# Patient Record
Sex: MALE | Race: WHITE | NOT HISPANIC OR LATINO | Employment: OTHER | URBAN - METROPOLITAN AREA
[De-identification: names, ages, dates, MRNs, and addresses within clinical notes are randomized per-mention and may not be internally consistent; named-entity substitution may affect disease eponyms.]

---

## 2023-03-07 ENCOUNTER — HOSPITAL ENCOUNTER (OUTPATIENT)
Dept: DATA CONVERSION | Facility: HOSPITAL | Age: 68
Discharge: ANOTHER HOSPITAL | End: 2023-03-07
Attending: EMERGENCY MEDICINE

## 2023-03-07 DIAGNOSIS — Z79.899 OTHER LONG TERM (CURRENT) DRUG THERAPY: ICD-10-CM

## 2023-03-07 DIAGNOSIS — S12.9XXA FRACTURE OF NECK, UNSPECIFIED, INITIAL ENCOUNTER (MULTI): Primary | ICD-10-CM

## 2023-03-07 DIAGNOSIS — F10.139 ALCOHOL ABUSE WITH WITHDRAWAL, UNSPECIFIED (MULTI): ICD-10-CM

## 2023-03-07 DIAGNOSIS — Z04.3 ENCOUNTER FOR EXAMINATION AND OBSERVATION FOLLOWING OTHER ACCIDENT: ICD-10-CM
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
## 2023-03-07 DIAGNOSIS — Z20.822 CONTACT WITH AND (SUSPECTED) EXPOSURE TO COVID-19: ICD-10-CM

## 2023-03-07 DIAGNOSIS — Z23 ENCOUNTER FOR IMMUNIZATION: ICD-10-CM

## 2023-03-07 LAB
ALBUMIN SERPL-MCNC: 4.3 GM/DL (ref 3.5–5)
ALBUMIN/GLOB SERPL: 1.3 RATIO (ref 1.5–3)
ALP BLD-CCNC: 87 U/L (ref 35–125)
ALT SERPL-CCNC: 93 U/L (ref 5–40)
AMPHETAMINES UR QL SCN>1000 NG/ML: NEGATIVE
ANION GAP SERPL CALCULATED.3IONS-SCNC: 14 MMOL/L (ref 0–19)
AST SERPL-CCNC: 136 U/L (ref 5–40)
BACTERIA UR QL AUTO: NEGATIVE
BARBITURATES UR QL SCN>300 NG/ML: NEGATIVE
BASOPHILS # BLD AUTO: 0.06 K/UL (ref 0–0.22)
BASOPHILS NFR BLD AUTO: 0.9 % (ref 0–1)
BENZODIAZ UR QL SCN>300 NG/ML: NEGATIVE
BILIRUB SERPL-MCNC: 0.9 MG/DL (ref 0.1–1.2)
BILIRUB UR QL STRIP.AUTO: NEGATIVE
BUN SERPL-MCNC: 8 MG/DL (ref 8–25)
BUN/CREAT SERPL: 10 RATIO (ref 8–21)
BZE UR QL SCN>300 NG/ML: NEGATIVE
CALCIUM SERPL-MCNC: 9.3 MG/DL (ref 8.5–10.4)
CANNABINOIDS UR QL SCN>50 NG/ML: NEGATIVE
CHLORIDE SERPL-SCNC: 106 MMOL/L (ref 97–107)
CLARITY UR: CLEAR
CO2 SERPL-SCNC: 26 MMOL/L (ref 24–31)
COLOR UR: YELLOW
CREAT SERPL-MCNC: 0.8 MG/DL (ref 0.4–1.6)
DEPRECATED RDW RBC AUTO: 44.2 FL (ref 37–54)
DIFFERENTIAL METHOD BLD: ABNORMAL
DRUG SCREEN COMMENT UR-IMP: NORMAL
EOSINOPHIL # BLD AUTO: 0.07 K/UL (ref 0–0.45)
EOSINOPHIL NFR BLD: 1.1 % (ref 0–3)
ERYTHROCYTE [DISTWIDTH] IN BLOOD BY AUTOMATED COUNT: 12.5 % (ref 11.7–15)
ETHANOL SERPL-MCNC: 0.23 GM/DL (ref 0–0.01)
EUA DISCLAIMER: NORMAL
FENTANYL+NORFENTANYL UR QL SCN: NORMAL
FLUAV RNA NPH QL NAA+PROBE: NEGATIVE
FLUBV RNA NPH QL NAA+PROBE: NEGATIVE
GFR SERPL CREATININE-BSD FRML MDRD: 98 ML/MIN/1.73 M2
GLOBULIN SER-MCNC: 3.4 G/DL (ref 1.9–3.7)
GLUCOSE SERPL-MCNC: 136 MG/DL (ref 65–99)
GLUCOSE UR STRIP.AUTO-MCNC: NEGATIVE MG/DL
HCT VFR BLD AUTO: 45.8 % (ref 41–50)
HGB BLD-MCNC: 15.5 GM/DL (ref 13.5–16.5)
HGB UR QL STRIP.AUTO: 12 /HPF (ref 0–3)
HGB UR QL: ABNORMAL
HS TROPONIN T DELTA: NORMAL (ref 0–4)
HYALINE CASTS UR QL AUTO: 3 /LPF
IMM GRANULOCYTES # BLD AUTO: 0.04 K/UL (ref 0–0.1)
KETONES UR QL STRIP.AUTO: ABNORMAL
LEUKOCYTE ESTERASE UR QL STRIP.AUTO: NEGATIVE
LYMPHOCYTES # BLD AUTO: 1.57 K/UL (ref 1.2–3.2)
LYMPHOCYTES NFR BLD MANUAL: 23.9 % (ref 20–40)
MCH RBC QN AUTO: 32.5 PG (ref 26–34)
MCHC RBC AUTO-ENTMCNC: 33.8 % (ref 31–37)
MCV RBC AUTO: 96 FL (ref 80–100)
METHADONE UR QL SCN>300 NG/ML: NEGATIVE
MICROSCOPIC (UA): ABNORMAL
MONOCYTES # BLD AUTO: 0.56 K/UL (ref 0–0.8)
MONOCYTES NFR BLD MANUAL: 8.5 % (ref 0–8)
NEUTROPHILS # BLD AUTO: 4.26 K/UL
NEUTROPHILS # BLD AUTO: 4.26 K/UL (ref 1.8–7.7)
NEUTROPHILS.IMMATURE NFR BLD: 0.6 % (ref 0–1)
NEUTS SEG NFR BLD: 65 % (ref 50–70)
NITRITE UR QL STRIP.AUTO: NEGATIVE
NRBC BLD-RTO: 0 /100 WBC
NT-PROBNP SERPL-MCNC: 196 PG/ML (ref 0–229)
OPIATES UR QL SCN>300 NG/ML: NEGATIVE
OXYCODONE UR QL: NEGATIVE
PCP UR QL SCN>25 NG/ML: NEGATIVE
PH UR STRIP.AUTO: 6 [PH] (ref 4.6–8)
PLATELET # BLD AUTO: 127 K/UL (ref 150–450)
PLATELET BLD QL SMEAR: ABNORMAL
PMV BLD AUTO: 9.4 CU (ref 7–12.6)
POTASSIUM SERPL-SCNC: 4.3 MMOL/L (ref 3.4–5.1)
PROLACTIN SERPL-MCNC: 33.7 NG/ML (ref 4–15.2)
PROT SERPL-MCNC: 7.7 G/DL (ref 5.9–7.9)
PROT UR STRIP.AUTO-MCNC: ABNORMAL MG/DL
RBC # BLD AUTO: 4.77 M/UL (ref 4.5–5.5)
RBC MORPH BLD: ABNORMAL
SARS-COV-2 RNA SPEC QL NAA+PROBE: NEGATIVE
SODIUM SERPL-SCNC: 146 MMOL/L (ref 133–145)
SP GR UR STRIP.AUTO: 1.02 (ref 1–1.03)
SQUAMOUS UR QL AUTO: ABNORMAL /HPF
TOXIC GRANULES BLD QL SMEAR: ABNORMAL
TROPONIN T SERPL-MCNC: 11 NG/L
URINE CULTURE: ABNORMAL
UROBILINOGEN UR QL STRIP.AUTO: 4 MG/DL (ref 0–1)
WBC # BLD AUTO: 6.6 K/UL (ref 4.5–11)
WBC #/AREA URNS AUTO: 1 /HPF (ref 0–3)
WBC MORPH BLD: ABNORMAL

## 2023-05-11 ENCOUNTER — HOSPITAL ENCOUNTER (OUTPATIENT)
Dept: DATA CONVERSION | Facility: HOSPITAL | Age: 68
Discharge: HOME | End: 2023-05-11
Attending: EMERGENCY MEDICINE

## 2023-05-11 DIAGNOSIS — R53.1 WEAKNESS: ICD-10-CM

## 2023-05-11 DIAGNOSIS — M54.2 CERVICALGIA: ICD-10-CM

## 2023-05-11 DIAGNOSIS — F10.229 ALCOHOL DEPENDENCE WITH INTOXICATION, UNSPECIFIED (MULTI): Primary | ICD-10-CM
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
## 2023-05-11 DIAGNOSIS — Z82.49 FAMILY HISTORY OF ISCHEMIC HEART DISEASE AND OTHER DISEASES OF THE CIRCULATORY SYSTEM: ICD-10-CM
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
## 2023-05-11 DIAGNOSIS — Z87.891 PERSONAL HISTORY OF NICOTINE DEPENDENCE: ICD-10-CM

## 2023-05-11 DIAGNOSIS — R26.89 OTHER ABNORMALITIES OF GAIT AND MOBILITY: ICD-10-CM

## 2023-05-11 LAB
ALBUMIN SERPL-MCNC: 4.8 GM/DL (ref 3.5–5)
ALBUMIN/GLOB SERPL: 1.5 RATIO (ref 1.5–3)
ALP BLD-CCNC: 64 U/L (ref 35–125)
ALT SERPL-CCNC: 50 U/L (ref 5–40)
AMPHETAMINES UR QL SCN>1000 NG/ML: NEGATIVE
ANION GAP SERPL CALCULATED.3IONS-SCNC: 25 MMOL/L (ref 0–19)
AST SERPL-CCNC: 73 U/L (ref 5–40)
BACTERIA UR QL AUTO: NEGATIVE
BARBITURATES UR QL SCN>300 NG/ML: NEGATIVE
BASOPHILS # BLD AUTO: 0.11 K/UL (ref 0–0.22)
BASOPHILS NFR BLD AUTO: 1.2 % (ref 0–1)
BENZODIAZ UR QL SCN>300 NG/ML: NORMAL
BILIRUB SERPL-MCNC: 2.2 MG/DL (ref 0.1–1.2)
BILIRUB UR QL STRIP.AUTO: NEGATIVE
BUN SERPL-MCNC: 8 MG/DL (ref 8–25)
BUN/CREAT SERPL: 11.4 RATIO (ref 8–21)
BZE UR QL SCN>300 NG/ML: NEGATIVE
CALCIUM SERPL-MCNC: 9.5 MG/DL (ref 8.5–10.4)
CANNABINOIDS UR QL SCN>50 NG/ML: NEGATIVE
CHLORIDE SERPL-SCNC: 96 MMOL/L (ref 97–107)
CLARITY UR: CLEAR
CO2 SERPL-SCNC: 19 MMOL/L (ref 24–31)
COLOR UR: YELLOW
CREAT SERPL-MCNC: 0.7 MG/DL (ref 0.4–1.6)
DEPRECATED RDW RBC AUTO: 44.9 FL (ref 37–54)
DIFFERENTIAL METHOD BLD: ABNORMAL
DRUG SCREEN COMMENT UR-IMP: NORMAL
EOSINOPHIL # BLD AUTO: 0.04 K/UL (ref 0–0.45)
EOSINOPHIL NFR BLD: 0.4 % (ref 0–3)
ERYTHROCYTE [DISTWIDTH] IN BLOOD BY AUTOMATED COUNT: 13.3 % (ref 11.7–15)
ETHANOL SERPL-MCNC: 0.24 GM/DL (ref 0–0.01)
FENTANYL+NORFENTANYL UR QL SCN: NORMAL
GFR SERPL CREATININE-BSD FRML MDRD: 102 ML/MIN/1.73 M2
GLOBULIN SER-MCNC: 3.2 G/DL (ref 1.9–3.7)
GLUCOSE SERPL-MCNC: 67 MG/DL (ref 65–99)
GLUCOSE UR STRIP.AUTO-MCNC: NEGATIVE MG/DL
HCT VFR BLD AUTO: 50.8 % (ref 41–50)
HGB BLD-MCNC: 17.3 GM/DL (ref 13.5–16.5)
HGB UR QL STRIP.AUTO: 14 /HPF (ref 0–3)
HGB UR QL: NEGATIVE
HYALINE CASTS UR QL AUTO: 3 /LPF
IMM GRANULOCYTES # BLD AUTO: 0.02 K/UL (ref 0–0.1)
KETONES UR QL STRIP.AUTO: ABNORMAL
LEUKOCYTE ESTERASE UR QL STRIP.AUTO: NEGATIVE
LYMPHOCYTES # BLD AUTO: 4.09 K/UL (ref 1.2–3.2)
LYMPHOCYTES NFR BLD MANUAL: 44.7 % (ref 20–40)
MCH RBC QN AUTO: 31 PG (ref 26–34)
MCHC RBC AUTO-ENTMCNC: 34.1 % (ref 31–37)
MCV RBC AUTO: 91 FL (ref 80–100)
METHADONE UR QL SCN>300 NG/ML: NEGATIVE
MICROSCOPIC (UA): ABNORMAL
MONOCYTES # BLD AUTO: 0.56 K/UL (ref 0–0.8)
MONOCYTES NFR BLD MANUAL: 6.1 % (ref 0–8)
NEUTROPHILS # BLD AUTO: 4.33 K/UL
NEUTROPHILS # BLD AUTO: 4.33 K/UL (ref 1.8–7.7)
NEUTROPHILS.IMMATURE NFR BLD: 0.2 % (ref 0–1)
NEUTS SEG NFR BLD: 47.4 % (ref 50–70)
NITRITE UR QL STRIP.AUTO: NEGATIVE
NRBC BLD-RTO: 0 /100 WBC
OPIATES UR QL SCN>300 NG/ML: NEGATIVE
OXYCODONE UR QL: NEGATIVE
PCP UR QL SCN>25 NG/ML: NEGATIVE
PH UR STRIP.AUTO: 5.5 [PH] (ref 4.6–8)
PLATELET # BLD AUTO: 205 K/UL (ref 150–450)
PMV BLD AUTO: 9.1 CU (ref 7–12.6)
POTASSIUM SERPL-SCNC: 4.1 MMOL/L (ref 3.4–5.1)
PROT SERPL-MCNC: 8 G/DL (ref 5.9–7.9)
PROT UR STRIP.AUTO-MCNC: 30 MG/DL
RBC # BLD AUTO: 5.58 M/UL (ref 4.5–5.5)
SODIUM SERPL-SCNC: 140 MMOL/L (ref 133–145)
SP GR UR STRIP.AUTO: 1.02 (ref 1–1.03)
SQUAMOUS UR QL AUTO: ABNORMAL /HPF
URINE CULTURE: ABNORMAL
UROBILINOGEN UR QL STRIP.AUTO: NORMAL MG/DL (ref 0–1)
WBC # BLD AUTO: 9.2 K/UL (ref 4.5–11)
WBC #/AREA URNS AUTO: ABNORMAL /HPF (ref 0–3)

## 2024-05-22 ENCOUNTER — HOSPITAL ENCOUNTER (EMERGENCY)
Facility: HOSPITAL | Age: 69
Discharge: OTHER INSTITUTION | End: 2024-05-23
Attending: EMERGENCY MEDICINE
Payer: MEDICARE

## 2024-05-22 ENCOUNTER — APPOINTMENT (OUTPATIENT)
Dept: CARDIOLOGY | Facility: HOSPITAL | Age: 69
End: 2024-05-22
Payer: MEDICARE

## 2024-05-22 DIAGNOSIS — R45.851 SUICIDAL IDEATION: Primary | ICD-10-CM

## 2024-05-22 LAB
ALBUMIN SERPL-MCNC: 4.2 G/DL (ref 3.5–5)
ALP BLD-CCNC: 148 U/L (ref 35–125)
ALT SERPL-CCNC: 106 U/L (ref 5–40)
AMPHETAMINES UR QL SCN>1000 NG/ML: NEGATIVE
ANION GAP SERPL CALC-SCNC: >19 MMOL/L
APPEARANCE UR: ABNORMAL
AST SERPL-CCNC: 270 U/L (ref 5–40)
BARBITURATES UR QL SCN>300 NG/ML: NEGATIVE
BASOPHILS # BLD AUTO: 0.08 X10*3/UL (ref 0–0.1)
BASOPHILS NFR BLD AUTO: 1 %
BENZODIAZ UR QL SCN>300 NG/ML: NEGATIVE
BILIRUB SERPL-MCNC: 3.5 MG/DL (ref 0.1–1.2)
BILIRUB UR STRIP.AUTO-MCNC: ABNORMAL MG/DL
BUN SERPL-MCNC: 7 MG/DL (ref 8–25)
BZE UR QL SCN>300 NG/ML: NEGATIVE
CALCIUM SERPL-MCNC: 9 MG/DL (ref 8.5–10.4)
CANNABINOIDS UR QL SCN>50 NG/ML: NEGATIVE
CAOX CRY #/AREA UR COMP ASSIST: ABNORMAL /HPF
CHLORIDE SERPL-SCNC: 102 MMOL/L (ref 97–107)
CO2 SERPL-SCNC: 19 MMOL/L (ref 24–31)
COLOR UR: ABNORMAL
CREAT SERPL-MCNC: 0.5 MG/DL (ref 0.4–1.6)
EGFRCR SERPLBLD CKD-EPI 2021: >90 ML/MIN/1.73M*2
EOSINOPHIL # BLD AUTO: 0.1 X10*3/UL (ref 0–0.7)
EOSINOPHIL NFR BLD AUTO: 1.2 %
ERYTHROCYTE [DISTWIDTH] IN BLOOD BY AUTOMATED COUNT: 15.2 % (ref 11.5–14.5)
ETHANOL SERPL-MCNC: 0.37 G/DL
FENTANYL+NORFENTANYL UR QL SCN: NEGATIVE
GLUCOSE SERPL-MCNC: 104 MG/DL (ref 65–99)
GLUCOSE UR STRIP.AUTO-MCNC: NORMAL MG/DL
HCT VFR BLD AUTO: 47.1 % (ref 41–52)
HGB BLD-MCNC: 16.1 G/DL (ref 13.5–17.5)
IMM GRANULOCYTES # BLD AUTO: 0.02 X10*3/UL (ref 0–0.7)
IMM GRANULOCYTES NFR BLD AUTO: 0.2 % (ref 0–0.9)
KETONES UR STRIP.AUTO-MCNC: ABNORMAL MG/DL
LEUKOCYTE ESTERASE UR QL STRIP.AUTO: NEGATIVE
LYMPHOCYTES # BLD AUTO: 3.05 X10*3/UL (ref 1.2–4.8)
LYMPHOCYTES NFR BLD AUTO: 37.2 %
MCH RBC QN AUTO: 33.3 PG (ref 26–34)
MCHC RBC AUTO-ENTMCNC: 34.2 G/DL (ref 32–36)
MCV RBC AUTO: 98 FL (ref 80–100)
METHADONE UR QL SCN>300 NG/ML: NEGATIVE
MONOCYTES # BLD AUTO: 0.85 X10*3/UL (ref 0.1–1)
MONOCYTES NFR BLD AUTO: 10.4 %
MUCOUS THREADS #/AREA URNS AUTO: ABNORMAL /LPF
NEUTROPHILS # BLD AUTO: 4.09 X10*3/UL (ref 1.2–7.7)
NEUTROPHILS NFR BLD AUTO: 50 %
NITRITE UR QL STRIP.AUTO: NEGATIVE
NRBC BLD-RTO: 0 /100 WBCS (ref 0–0)
OPIATES UR QL SCN>300 NG/ML: NEGATIVE
OXYCODONE UR QL: NEGATIVE
PCP UR QL SCN>25 NG/ML: NEGATIVE
PH UR STRIP.AUTO: 6 [PH]
PLATELET # BLD AUTO: 67 X10*3/UL (ref 150–450)
POTASSIUM SERPL-SCNC: 3.8 MMOL/L (ref 3.4–5.1)
PROT SERPL-MCNC: 7.5 G/DL (ref 5.9–7.9)
PROT UR STRIP.AUTO-MCNC: ABNORMAL MG/DL
RBC # BLD AUTO: 4.83 X10*6/UL (ref 4.5–5.9)
RBC # UR STRIP.AUTO: NEGATIVE /UL
RBC #/AREA URNS AUTO: ABNORMAL /HPF
RBC MORPH BLD: NORMAL
SODIUM SERPL-SCNC: 141 MMOL/L (ref 133–145)
SP GR UR STRIP.AUTO: 1.03
SQUAMOUS #/AREA URNS AUTO: ABNORMAL /HPF
UROBILINOGEN UR STRIP.AUTO-MCNC: ABNORMAL MG/DL
WBC # BLD AUTO: 8.2 X10*3/UL (ref 4.4–11.3)
WBC #/AREA URNS AUTO: ABNORMAL /HPF

## 2024-05-22 PROCEDURE — 2500000004 HC RX 250 GENERAL PHARMACY W/ HCPCS (ALT 636 FOR OP/ED): Performed by: PHYSICIAN ASSISTANT

## 2024-05-22 PROCEDURE — 2500000004 HC RX 250 GENERAL PHARMACY W/ HCPCS (ALT 636 FOR OP/ED): Performed by: EMERGENCY MEDICINE

## 2024-05-22 PROCEDURE — 90839 PSYTX CRISIS INITIAL 60 MIN: CPT

## 2024-05-22 PROCEDURE — 36415 COLL VENOUS BLD VENIPUNCTURE: CPT | Performed by: PHYSICIAN ASSISTANT

## 2024-05-22 PROCEDURE — 2500000001 HC RX 250 WO HCPCS SELF ADMINISTERED DRUGS (ALT 637 FOR MEDICARE OP): Performed by: EMERGENCY MEDICINE

## 2024-05-22 PROCEDURE — 99285 EMERGENCY DEPT VISIT HI MDM: CPT | Mod: 25

## 2024-05-22 PROCEDURE — 80307 DRUG TEST PRSMV CHEM ANLYZR: CPT | Performed by: PHYSICIAN ASSISTANT

## 2024-05-22 PROCEDURE — 96374 THER/PROPH/DIAG INJ IV PUSH: CPT

## 2024-05-22 PROCEDURE — 80053 COMPREHEN METABOLIC PANEL: CPT | Performed by: PHYSICIAN ASSISTANT

## 2024-05-22 PROCEDURE — 93005 ELECTROCARDIOGRAM TRACING: CPT

## 2024-05-22 PROCEDURE — 82077 ASSAY SPEC XCP UR&BREATH IA: CPT | Performed by: EMERGENCY MEDICINE

## 2024-05-22 PROCEDURE — 85025 COMPLETE CBC W/AUTO DIFF WBC: CPT | Performed by: PHYSICIAN ASSISTANT

## 2024-05-22 PROCEDURE — 96375 TX/PRO/DX INJ NEW DRUG ADDON: CPT

## 2024-05-22 PROCEDURE — 81001 URINALYSIS AUTO W/SCOPE: CPT | Mod: 59 | Performed by: PHYSICIAN ASSISTANT

## 2024-05-22 RX ORDER — FOLIC ACID 1 MG/1
1 TABLET ORAL DAILY
Status: DISCONTINUED | OUTPATIENT
Start: 2024-05-22 | End: 2024-05-23 | Stop reason: HOSPADM

## 2024-05-22 RX ORDER — ONDANSETRON HYDROCHLORIDE 2 MG/ML
4 INJECTION, SOLUTION INTRAVENOUS ONCE
Status: COMPLETED | OUTPATIENT
Start: 2024-05-22 | End: 2024-05-22

## 2024-05-22 RX ORDER — MULTIVIT-MIN/IRON FUM/FOLIC AC 7.5 MG-4
1 TABLET ORAL DAILY
Status: DISCONTINUED | OUTPATIENT
Start: 2024-05-22 | End: 2024-05-23 | Stop reason: HOSPADM

## 2024-05-22 RX ORDER — FAMOTIDINE 10 MG/ML
20 INJECTION INTRAVENOUS ONCE
Status: COMPLETED | OUTPATIENT
Start: 2024-05-22 | End: 2024-05-22

## 2024-05-22 RX ORDER — THIAMINE HYDROCHLORIDE 100 MG/ML
100 INJECTION, SOLUTION INTRAMUSCULAR; INTRAVENOUS DAILY
Status: DISCONTINUED | OUTPATIENT
Start: 2024-05-22 | End: 2024-05-23 | Stop reason: HOSPADM

## 2024-05-22 RX ORDER — LANOLIN ALCOHOL/MO/W.PET/CERES
100 CREAM (GRAM) TOPICAL DAILY
Status: DISCONTINUED | OUTPATIENT
Start: 2024-05-25 | End: 2024-05-22

## 2024-05-22 RX ORDER — LOPERAMIDE HYDROCHLORIDE 2 MG/1
2 CAPSULE ORAL 4 TIMES DAILY PRN
Status: DISCONTINUED | OUTPATIENT
Start: 2024-05-22 | End: 2024-05-23 | Stop reason: HOSPADM

## 2024-05-22 RX ORDER — PHENOBARBITAL SODIUM 65 MG/ML
260 INJECTION, SOLUTION INTRAMUSCULAR; INTRAVENOUS ONCE
Status: DISCONTINUED | OUTPATIENT
Start: 2024-05-22 | End: 2024-05-22

## 2024-05-22 RX ORDER — PHENOBARBITAL SODIUM 65 MG/ML
130 INJECTION, SOLUTION INTRAMUSCULAR; INTRAVENOUS ONCE
Status: COMPLETED | OUTPATIENT
Start: 2024-05-22 | End: 2024-05-22

## 2024-05-22 RX ORDER — PHENOBARBITAL SODIUM 65 MG/ML
130 INJECTION, SOLUTION INTRAMUSCULAR; INTRAVENOUS ONCE
Status: COMPLETED | OUTPATIENT
Start: 2024-05-22 | End: 2024-05-23

## 2024-05-22 RX ADMIN — PHENOBARBITAL SODIUM 130 MG: 65 INJECTION INTRAMUSCULAR at 21:14

## 2024-05-22 RX ADMIN — Medication 1 TABLET: at 18:55

## 2024-05-22 RX ADMIN — SODIUM CHLORIDE 1000 ML: 900 INJECTION, SOLUTION INTRAVENOUS at 18:53

## 2024-05-22 RX ADMIN — THIAMINE HYDROCHLORIDE 100 MG: 100 INJECTION, SOLUTION INTRAMUSCULAR; INTRAVENOUS at 18:55

## 2024-05-22 RX ADMIN — FAMOTIDINE 20 MG: 10 INJECTION, SOLUTION INTRAVENOUS at 21:45

## 2024-05-22 RX ADMIN — ONDANSETRON 4 MG: 2 INJECTION INTRAMUSCULAR; INTRAVENOUS at 21:44

## 2024-05-22 RX ADMIN — FOLIC ACID 1 MG: 1 TABLET ORAL at 18:55

## 2024-05-22 SDOH — HEALTH STABILITY: MENTAL HEALTH: BEHAVIORS/MOOD: APPEARS INTOXICATED;COOPERATIVE

## 2024-05-22 SDOH — HEALTH STABILITY: MENTAL HEALTH: ANXIETY SYMPTOMS: GENERALIZED

## 2024-05-22 SDOH — HEALTH STABILITY: MENTAL HEALTH: IN THE PAST WEEK, HAVE YOU BEEN HAVING THOUGHTS ABOUT KILLING YOURSELF?: YES

## 2024-05-22 SDOH — HEALTH STABILITY: MENTAL HEALTH: IN THE PAST FEW WEEKS, HAVE YOU WISHED YOU WERE DEAD?: YES

## 2024-05-22 SDOH — HEALTH STABILITY: MENTAL HEALTH: IN THE PAST FEW WEEKS, HAVE YOU FELT THAT YOU OR YOUR FAMILY WOULD BE BETTER OFF IF YOU WERE DEAD?: YES

## 2024-05-22 SDOH — HEALTH STABILITY: MENTAL HEALTH: SUICIDAL BEHAVIOR (3 MONTHS): NO

## 2024-05-22 SDOH — HEALTH STABILITY: MENTAL HEALTH: SLEEP PATTERN: INSOMNIA

## 2024-05-22 SDOH — HEALTH STABILITY: MENTAL HEALTH
DEPRESSION SYMPTOMS: FEELINGS OF HELPLESSNESS;FEELINGS OF HOPELESSESS;FEELINGS OF WORTHLESSNESS;ISOLATIVE;LOSS OF INTEREST;SLEEP DISTURBANCE

## 2024-05-22 SDOH — HEALTH STABILITY: MENTAL HEALTH: WISH TO BE DEAD (PAST 1 MONTH): YES

## 2024-05-22 SDOH — HEALTH STABILITY: MENTAL HEALTH

## 2024-05-22 SDOH — HEALTH STABILITY: MENTAL HEALTH: HAVE YOU WISHED YOU WERE DEAD OR WISHED YOU COULD GO TO SLEEP AND NOT WAKE UP?: YES

## 2024-05-22 SDOH — HEALTH STABILITY: MENTAL HEALTH
HAVE YOU STARTED TO WORK OUT OR WORKED OUT THE DETAILS OF HOW TO KILL YOURSELF? DO YOU INTENT TO CARRY OUT THIS PLAN?: NO

## 2024-05-22 SDOH — HEALTH STABILITY: MENTAL HEALTH: HAVE YOU BEEN THINKING ABOUT HOW YOU MIGHT DO THIS?: YES

## 2024-05-22 SDOH — ECONOMIC STABILITY: HOUSING INSECURITY: FEELS SAFE LIVING IN HOME: YES

## 2024-05-22 SDOH — HEALTH STABILITY: MENTAL HEALTH: ACTIVE SUICIDAL IDEATION WITH SOME INTENT TO ACT, WITHOUT SPECIFIC PLAN (PAST 1 MONTH): NO

## 2024-05-22 SDOH — HEALTH STABILITY: MENTAL HEALTH: HAVE YOU ACTUALLY HAD ANY THOUGHTS OF KILLING YOURSELF?: YES

## 2024-05-22 SDOH — HEALTH STABILITY: MENTAL HEALTH: HAVE YOU HAD THESE THOUGHTS AND HAD SOME INTENTION OF ACTING ON THEM?: NO

## 2024-05-22 SDOH — HEALTH STABILITY: MENTAL HEALTH: ACTIVE SUICIDAL IDEATION WITH SPECIFIC PLAN AND INTENT (PAST 1 MONTH): NO

## 2024-05-22 SDOH — HEALTH STABILITY: MENTAL HEALTH: ARE YOU HAVING THOUGHTS OF KILLING YOURSELF RIGHT NOW?: NO

## 2024-05-22 SDOH — HEALTH STABILITY: MENTAL HEALTH: SUICIDE ASSESSMENT: ADULT (C-SSRS)

## 2024-05-22 SDOH — HEALTH STABILITY: MENTAL HEALTH: SUICIDAL BEHAVIOR (LIFETIME): NO

## 2024-05-22 SDOH — HEALTH STABILITY: MENTAL HEALTH: HAVE YOU EVER TRIED TO KILL YOURSELF?: NO

## 2024-05-22 SDOH — HEALTH STABILITY: MENTAL HEALTH: HAVE YOU EVER DONE ANYTHING, STARTED TO DO ANYTHING, OR PREPARED TO DO ANYTHING TO END YOUR LIFE?: NO

## 2024-05-22 SDOH — HEALTH STABILITY: MENTAL HEALTH: NON-SPECIFIC ACTIVE SUICIDAL THOUGHTS (PAST 1 MONTH): YES

## 2024-05-22 ASSESSMENT — LIFESTYLE VARIABLES
ORIENTATION AND CLOUDING OF SENSORIUM: ORIENTED AND CAN DO SERIAL ADDITIONS
PRESCIPTION_ABUSE_PAST_12_MONTHS: NO
HEADACHE, FULLNESS IN HEAD: NOT PRESENT
NAUSEA AND VOMITING: 2
AGITATION: SOMEWHAT MORE THAN NORMAL ACTIVITY
ORIENTATION AND CLOUDING OF SENSORIUM: DISORIENTED FOR DATA BY NO MORE THAN 2 CALENDAR DAYS
SUBSTANCE_ABUSE_PAST_12_MONTHS: YES
AUDITORY DISTURBANCES: NOT PRESENT
AGITATION: 2
PAROXYSMAL SWEATS: BARELY PERCEPTIBLE SWEATING, PALMS MOIST
PULSE: 86
AUDITORY DISTURBANCES: NOT PRESENT
BLOOD PRESSURE: 136/88
TOTAL SCORE: 14
TREMOR: 2
TREMOR: MODERATE, WITH PATIENT'S ARMS EXTENDED
TOTAL SCORE: 11
PULSE: 103
BLOOD PRESSURE: 137/90
VISUAL DISTURBANCES: NOT PRESENT
ANXIETY: 2
VISUAL DISTURBANCES: NOT PRESENT
NAUSEA AND VOMITING: INTERMITTENT NAUSEA WITH DRY HEAVES
ANXIETY: 3
PAROXYSMAL SWEATS: NO SWEAT VISIBLE
HEADACHE, FULLNESS IN HEAD: NOT PRESENT
TACTILE DISTURBANCES: MILD ITCHING, PINS AND NEEDLES, BURNING OR NUMBNESS

## 2024-05-22 ASSESSMENT — COLUMBIA-SUICIDE SEVERITY RATING SCALE - C-SSRS
1. IN THE PAST MONTH, HAVE YOU WISHED YOU WERE DEAD OR WISHED YOU COULD GO TO SLEEP AND NOT WAKE UP?: YES
2. HAVE YOU ACTUALLY HAD ANY THOUGHTS OF KILLING YOURSELF?: YES
6. HAVE YOU EVER DONE ANYTHING, STARTED TO DO ANYTHING, OR PREPARED TO DO ANYTHING TO END YOUR LIFE?: YES
5. HAVE YOU STARTED TO WORK OUT OR WORKED OUT THE DETAILS OF HOW TO KILL YOURSELF? DO YOU INTEND TO CARRY OUT THIS PLAN?: YES
6. HAVE YOU EVER DONE ANYTHING, STARTED TO DO ANYTHING, OR PREPARED TO DO ANYTHING TO END YOUR LIFE?: YES
4. HAVE YOU HAD THESE THOUGHTS AND HAD SOME INTENTION OF ACTING ON THEM?: YES

## 2024-05-22 NOTE — ED NOTES
Patient belongings labeled and placed in locker 2    1 pair of slippers  1 pair of pants  1 shirt  1 phone   1 robe    No wallet here at time of arrival     Marcelina Murillo, EMT  05/22/24 3564

## 2024-05-23 VITALS
TEMPERATURE: 98.1 F | OXYGEN SATURATION: 95 % | BODY MASS INDEX: 29.62 KG/M2 | DIASTOLIC BLOOD PRESSURE: 93 MMHG | HEIGHT: 69 IN | HEART RATE: 103 BPM | SYSTOLIC BLOOD PRESSURE: 138 MMHG | RESPIRATION RATE: 16 BRPM | WEIGHT: 200 LBS

## 2024-05-23 LAB
HOLD SPECIMEN: NORMAL
SARS-COV-2 RNA RESP QL NAA+PROBE: NOT DETECTED

## 2024-05-23 PROCEDURE — 87635 SARS-COV-2 COVID-19 AMP PRB: CPT | Performed by: PHYSICIAN ASSISTANT

## 2024-05-23 PROCEDURE — 96376 TX/PRO/DX INJ SAME DRUG ADON: CPT

## 2024-05-23 PROCEDURE — 2500000004 HC RX 250 GENERAL PHARMACY W/ HCPCS (ALT 636 FOR OP/ED): Performed by: PHYSICIAN ASSISTANT

## 2024-05-23 PROCEDURE — 2500000006 HC RX 250 W HCPCS SELF ADMINISTERED DRUGS (ALT 637 FOR ALL PAYERS): Mod: MUE | Performed by: EMERGENCY MEDICINE

## 2024-05-23 RX ORDER — DIAZEPAM 5 MG/1
10 TABLET ORAL EVERY 2 HOUR PRN
Status: DISCONTINUED | OUTPATIENT
Start: 2024-05-23 | End: 2024-05-23 | Stop reason: HOSPADM

## 2024-05-23 RX ADMIN — DIAZEPAM 10 MG: 5 TABLET ORAL at 03:03

## 2024-05-23 RX ADMIN — PHENOBARBITAL SODIUM 130 MG: 65 INJECTION INTRAMUSCULAR at 00:09

## 2024-05-23 RX ADMIN — DIAZEPAM 10 MG: 5 TABLET ORAL at 08:37

## 2024-05-23 RX ADMIN — DIAZEPAM 10 MG: 5 TABLET ORAL at 05:29

## 2024-05-23 ASSESSMENT — LIFESTYLE VARIABLES
NAUSEA AND VOMITING: NO NAUSEA AND NO VOMITING
PULSE: 88
TOTAL SCORE: 7
HEADACHE, FULLNESS IN HEAD: NOT PRESENT
AUDITORY DISTURBANCES: NOT PRESENT
AGITATION: SOMEWHAT MORE THAN NORMAL ACTIVITY
AUDITORY DISTURBANCES: NOT PRESENT
ORIENTATION AND CLOUDING OF SENSORIUM: ORIENTED AND CAN DO SERIAL ADDITIONS
PAROXYSMAL SWEATS: NO SWEAT VISIBLE
TOTAL SCORE: 7
ANXIETY: 2
VISUAL DISTURBANCES: NOT PRESENT
HEADACHE, FULLNESS IN HEAD: NOT PRESENT
TREMOR: MODERATE, WITH PATIENT'S ARMS EXTENDED
PAROXYSMAL SWEATS: NO SWEAT VISIBLE
BLOOD PRESSURE: 144/98
TOTAL SCORE: 7
PAROXYSMAL SWEATS: NO SWEAT VISIBLE
AGITATION: NORMAL ACTIVITY
AGITATION: NORMAL ACTIVITY
NAUSEA AND VOMITING: NO NAUSEA AND NO VOMITING
TREMOR: SEVERE, EVEN WITH ARMS NOT EXTENDED
AUDITORY DISTURBANCES: NOT PRESENT
ORIENTATION AND CLOUDING OF SENSORIUM: ORIENTED AND CAN DO SERIAL ADDITIONS
TACTILE DISTURBANCES: VERY MILD ITCHING, PINS AND NEEDLES, BURNING OR NUMBNESS
VISUAL DISTURBANCES: NOT PRESENT
TREMOR: 2
ANXIETY: NO ANXIETY, AT EASE
VISUAL DISTURBANCES: NOT PRESENT
HEADACHE, FULLNESS IN HEAD: NOT PRESENT
ANXIETY: 3
NAUSEA AND VOMITING: MILD NAUSEA WITH NO VOMITING
ORIENTATION AND CLOUDING OF SENSORIUM: ORIENTED AND CAN DO SERIAL ADDITIONS

## 2024-05-23 ASSESSMENT — PAIN DESCRIPTION - PROGRESSION: CLINICAL_PROGRESSION: NOT CHANGED

## 2024-05-23 ASSESSMENT — PAIN SCALES - GENERAL: PAINLEVEL_OUTOF10: 7

## 2024-05-23 ASSESSMENT — PAIN DESCRIPTION - PAIN TYPE: TYPE: CHRONIC PAIN

## 2024-05-23 ASSESSMENT — PAIN - FUNCTIONAL ASSESSMENT: PAIN_FUNCTIONAL_ASSESSMENT: 0-10

## 2024-05-23 NOTE — PROGRESS NOTES
EPAT - Social Work Psychiatric Assessment    Arrival Details  Mode of Arrival: Ambulance  Admission Source: Home  Admission Type: Voluntary  EPAT Assessment Start Date: 05/22/24  EPAT Assessment Start Time: 2100  Name of : RHEA Breen    History of Present Illness  Admission Reason: Suicidal Ideation, Heavy alcohol abuse  HPI: Pt is a 68 y.o. male who was brought to the ED by police after he called the crisis hotline [email or web address] stating he was very depressed and having thoughts of hurting himself. He denies having an active plan but admits to owning numerous firearms. Pt has a hx of PTSD, depression, and alcohol abuse. He is a  and [facility]     Readmission Information   Readmission within 30 Days: No    Psychiatric Symptoms  Anxiety Symptoms: Generalized  Depression Symptoms: Feelings of helplessness, Feelings of hopelessess, Feelings of worthlessness, Isolative, Loss of interest, Sleep disturbance  Sheila Symptoms: No problems reported or observed.    Psychosis Symptoms  Hallucination Type: No problems reported or observed.  Delusion Type: No problems reported or observed.    Additional Symptoms - Adult  Generalized Anxiety Disorder: Easily fatigued, Irritability, Restlessness, Sleep disturbance  Obsessive Compulsive Disorder: No problems reported or observed.  Panic Attack: No problems reported or observed.  Post Traumatic Stress Disorder: Avoidance of stimuli associated w/ event, Hypervigilance, Irritability, Re-living event  Delirium: No problems reported or observed.    Past Psychiatric History/Meds/Treatments  Past Psychiatric History: Pt has a hx of PTSD, depression, and alcohol abuse. Documentation for chart review is limited as he is a  and [facility]  Past Psychiatric Meds/Treatments: Pt denies any previous psych or detox inpatient admissions. [facility] and admits he stopped taking  them because he didn't like them. Pt [facility].    Support System: Other (Comment) (no one)    Living Arrangement: Lives alone    Home Safety  Feels Safe Living in Home: Yes    Income Information  Employment Status for: Patient  Employment Status: Retired  Income Source: [facility] P[facility]  Current/Previous Occupation:  (and police)    Milt[place] Service/Education History  Current or Previous  Service: Active duty, past, [facility]    Drug Screening  Have you used any substances (canabis, cocaine, heroin, hallucinogens, inhalants, etc.) in the past 12 months?: Yes  Have you used any prescription drugs other than prescribed in the past 12 months?: No  Is a toxicology screen needed?: Yes    Stage of Change  Stage of Change: Preparation  History of Treatment: Dual, AA/NA meetings, IOP  Type of Treatment Offered: Inpatient  Treatment Offered: Accepted  Duration of Substance Use: Many decades  Frequency of Substance Use: Daily  Age of First Substance Use: 14    Psychosocial  Psychosocial (WDL): Within Defined Limits    Orientation  Orientation Level: Oriented X4, Appropriate for developmental age    General Appearance  Motor Activity: Psychomotor retardation  Speech Pattern: Other (Comment) (unremarkable)  General Attitude: Cooperative, Withdrawn  Appearance/Hygiene: Disheveled    Thought Process  Coherency: Unable to assess  Content: Unremarkable  Delusions: Other (Comment) (none)  Perception: Not altered  Hallucination: None  Judgment/Insight: Impaired  Confusion: None  Cognition: Appropriate attention/concentration, Appropriate for developmental age    Sleep Pattern  Sleep Pattern: Insomnia    Risk Factors  Self Harm/Suicidal Ideation Plan: Pt denies plan but alludes to owning many guns  Risk Factors: Major mental illness, Male, Member of violent subculture,  history or weapons training, Substance abuse, Presence of firearms in  home    Violence Risk Assessment  Assessment of Violence: None noted  Thoughts of Harm to Others: No    Ability to Assess Risk Screen  Risk Screen - Ability to Assess: Able to be screened  Ask Suicide-Screening Questions  1. In the past few weeks, have you wished you were dead?: Yes  2. In the past few weeks, have you felt that you or your family would be better off if you were dead?: Yes  3. In the past week, have you been having thoughts about killing yourself?: Yes  4. Have you ever tried to kill yourself?: No  5. Are you having thoughts of killing yourself right now?: No  Calculated Risk Score: Potential Risk  [place] Suicide Severity Rating Scale (Screener/Recent Self-Report)  1. Wish to be Dead (Past 1 Month): Yes  2. Non-Specific Active Suicidal Thoughts (Past 1 Month): Yes  3. Active Suicidal Ideation with any Methods (Not Plan) Without Intent to Act (Past 1 Month): Yes  4. Active Suicidal Ideation with Some Intent to Act, Without Specific Plan (Past 1 Month): No  5. Active Suicidal Ideation with Specific Plan and Intent (Past 1 Month): No  6. Suicidal Behavior (Lifetime): No  6. Suicidal Behavior (3 Months): No  Calculated C-SSRS Risk Score (Lifetime/Recent): Moderate Risk  Step 1: Risk Factors  Current & Past Psychiatric Dx: Mood disorder, Alcohol/substance abuse disorders, PTSD  Presenting Symptoms: Hopelessness or despair, Insomia  Precipitants/Stressors: Chronic physical pain or other acute medical problem (e.g. CNS disorders), Substance intoxication or withdrawal, Social isolation  Change in Treatment: Hopeless or dissatisfied with provider or treatment  Access to Lethal Methods : Yes    Psychiatric Impression and Plan of Care  Assessment and Plan: Pt was A&Ox4 for EPAT assessment, though heavily intoxicated. Pt's BAL upon arrival to the ED was 368. His affect was flat and withdrawn but cooperative. Pt appears disheveled and depression. Pt was brought to ED for a psych eval after calling the crisis  "hotline and stating he had strong thoughts to harm himself. Pt is denying any concrete plan but alludes that his plan would be to shoot himself by telling ED staff he \"keeps many guns in his home.\" Pt is extremely depressed and isolative. He states he has no friends or family and hasn't hung out with or spoken to anyone in several months. He has a hx of PTSD, depression, and alcohol abuse and states he has always drank heavily during his adult life, but the drinking became worse when his last wife  from cancer. Pt states he currently drinks about a 5th of whiskey a day. He drinks unitl he passes out because he states he has severe insomnia. Pt is an ex- and ex-. So he has [facility]. Pt has been to  and [facility] but feels they did not help him. He would like to try getting help elsewhere now. Pt feels he still struggles with PTSD from his time as a  and in the . Pt is not currently experiencing withdrawal but states when he does he has anxiety, tremors, insomnia, and stomach issues. Pt denies any hx of DTs or seizures. Pt is currently denying any HI or AVH. There is no evidence or hx of any manic or psychotic symptoms. Pt is requesting admission.  Plan Comments: Refer pt for inpatient admission    Outcome/Disposition  Assessment, Recommendations and Risk Level Reviewed with: ED RN and ED Physician  EPAT Assessment Completed Date: 24  EPAT Assessment Completed Time: 2200  Patient Disposition: Out of network facility (Specify)  Out of Network Reason: Patient requires dual diagnosis treatment    Social Work Note  "

## 2024-05-23 NOTE — ED PROVIDER NOTES
HPI   Chief Complaint   Patient presents with    Psychiatric Evaluation     Pt states that he was drinking today and had thoughts of ending his life. Pt states that he was thinking about using a gun to shoot himself. Pt states that he called the crisis line and made comments that he had been thinking this way. Pt states that his wife passed away a few years ago and it has been hitting him recently       This is a 68-year-old male presenting to the emergency department for suicidal ideations.  Patient states that he called a suicide crisis line today as he has having suicidal ideations, and states that the crisis line called police to his home.  Law enforcement officers brought patient to emergency department.  Patient states that he has been drinking whiskey daily over the last few months.  Patient states that he did drink today.  He states he is currently has suicidal ideations.  No plan.  No homicidal ideations.      Please see HPI for pertinent positive and negative ROS.                  [place] Coma Scale Score: 15                     Patient History   No past medical history on file.  Past Surgical History:   Procedure Laterality Date    CT ANGIO NECK W AND WO IV CONTRAST  3/7/2023    CT NECK ANGIO W AND WO IV CONTRAST [facility] CT     No family history on file.  Social History     Tobacco Use    Smoking status: Not on file    Smokeless tobacco: Not on file   Substance Use Topics    Alcohol use: Not on file    Drug use: Not on file       Physical Exam   ED Triage Vitals [05/22/24 1805]   Temperature Heart Rate Respirations BP   36.7 °C (98.1 °F) (!) 103 20 131/90      Pulse Ox Temp Source Heart Rate Source Patient Position   99 % Temporal Monitor Sitting      BP Location FiO2 (%)     Left arm --       Physical Exam  GENERAL APPEARANCE: Awake and alert. No acute respiratory distress.  Smells of alcohol.  Disheveled appearance.  VITAL SIGNS: As per the nurses' triage record.  HEENT: Normocephalic, atraumatic.  Extraocular muscles are intact. Conjunctiva are pink. Negative scleral icterus. Mucous membranes are moist.   NECK: Soft, nontender and supple  CHEST: Nontender to palpation. Clear to auscultation bilaterally. Symmetric rise and fall of chest wall.   HEART: Clear S1 and S2. Regular rate and rhythm. Strong and equal pulses in the extremities.  ABDOMEN: Soft, nontender, nondistended  MUSCULOSKELETAL: The calves are nontender to palpation. Full gross active range of motion. Ambulating on own with no acute difficulties  NEUROLOGICAL: Awake, alert and oriented x 3. Motor power intact in the upper and lower extremities. Sensation is intact to light touch in the upper and lower extremities. Patient answering questions appropriately.   IMMUNOLOGICAL: No lymphatic streaking noted  DERMATOLOGIC: Warm and dry without petechiae, rashes, or ecchymosis noted on visible skin.   PYSCH: Cooperative with appropriate mood and affect.  ED Course & MDM   ED Course as of 05/23/24 0105   Wed May 22, 2024   1822 Normal sinus rhythm with a rate of 87.  IL interval is 156.  QRS duration is 82.  No evidence of ischemia.  EKG is similar to May 11, 2023 [JN]      ED Course User Index  [JN] Alejandro Blair MD         Diagnoses as of 05/23/24 0105   Suicidal ideation       Medical Decision Making  Parts of this chart have been completed using voice recognition software. Please excuse any errors of transcription.  My thought process and reason for plan has been formulated from the time that I saw the patient until the time of disposition and is not specific to one specific moment during their visit and furthermore my MDM encompasses this entire chart and not only this text box.      HPI: Detailed above.    Exam: A medically appropriate exam performed, outlined above, given the known history and presentation.    History obtained from: Patient    EKG: See my supervising physician's EKG interpretation    Medications given during  visit:  Medications   folic acid (Folvite) tablet 1 mg (1 mg oral Given 5/22/24 1855)   multivitamin with minerals 1 tablet (1 tablet oral Given 5/22/24 1855)   thiamine (Vitamin B1) injection 100 mg (100 mg intravenous Given 5/22/24 1855)   loperamide (Imodium) capsule 2 mg (has no administration in time range)   sodium chloride 0.9 % bolus 1,000 mL (0 mL intravenous Stopped 5/22/24 1923)   PHENobarbital (Luminal) injection 130 mg (130 mg intravenous Given 5/22/24 2114)   PHENobarbital (Luminal) injection 130 mg (130 mg intravenous Given 5/23/24 0009)   famotidine PF (Pepcid) injection 20 mg (20 mg intravenous Given 5/22/24 2145)   ondansetron (Zofran) injection 4 mg (4 mg intravenous Given 5/22/24 2144)        Diagnostic/tests  Labs Reviewed   CBC WITH AUTO DIFFERENTIAL - Abnormal       Result Value    WBC 8.2      nRBC 0.0      RBC 4.83      Hemoglobin 16.1      Hematocrit 47.1      MCV 98      MCH 33.3      MCHC 34.2      RDW 15.2 (*)     Platelets 67 (*)     Neutrophils % 50.0      Immature Granulocytes %, Automated 0.2      Lymphocytes % 37.2      Monocytes % 10.4      Eosinophils % 1.2      Basophils % 1.0      Neutrophils Absolute 4.09      Immature Granulocytes Absolute, Automated 0.02      Lymphocytes Absolute 3.05      Monocytes Absolute 0.85      Eosinophils Absolute 0.10      Basophils Absolute 0.08     COMPREHENSIVE METABOLIC PANEL - Abnormal    Glucose 104 (*)     Sodium 141      Potassium 3.8      Chloride 102      Bicarbonate 19 (*)     Urea Nitrogen 7 (*)     Creatinine 0.50      eGFR >90      Calcium 9.0      Albumin 4.2      Alkaline Phosphatase 148 (*)     Total Protein 7.5       (*)     Bilirubin, Total 3.5 (*)      (*)     Anion Gap >19 (*)    URINALYSIS WITH REFLEX CULTURE AND MICROSCOPIC - Abnormal    Color, Urine Dark-Yellow      Appearance, Urine Turbid (*)     Specific Gravity, Urine 1.029      pH, Urine 6.0      Protein, Urine 30 (1+) (*)     Glucose, Urine Normal       Blood, Urine NEGATIVE      Ketones, Urine 40 (2+) (*)     Bilirubin, Urine 0.5 (1+) (*)     Urobilinogen, Urine OVER (4+) (*)     Nitrite, Urine NEGATIVE      Leukocyte Esterase, Urine NEGATIVE     ALCOHOL - Abnormal    Alcohol 0.368 (*)    URINALYSIS MICROSCOPIC WITH REFLEX CULTURE - Abnormal    WBC, Urine 1-5      RBC, Urine 1-2      Squamous Epithelial Cells, Urine 1-9 (SPARSE)      Mucus, Urine 4+      Calcium Oxalate Crystals, Urine 3+ (*)    DRUG SCREEN,URINE - Normal    Amphetamine Screen, Urine Negative      Barbiturate Screen, Urine Negative      Benzodiazepines Screen, Urine Negative      Cannabinoid Screen, Urine Negative      Cocaine Metabolite Screen, Urine Negative      Fentanyl Screen, Urine Negative      Methadone Screen, Urine Negative      Opiate Screen, Urine Negative      Oxycodone Screen, Urine Negative      PCP Screen, Urine Negative      Narrative:     These toxicological screening tests provide unconfirmed qualitative measurements to aid in treatment and diagnosis in cases of drug use or overdose. This test is used only for medical purposes. A positive result does not indicate or measure intoxication. For specific test performance or pathologist consultation, please contact the Laboratory.    The following threshold concentrations are used for these analyses.Values at or above the threshold concentration are reported as positive. Values below the threshold are reported as negative.    Drug /Screening Threshold                                                                                                 THC/CANNABINOIDS................50 ng/ml  METHADONE......................300 ng/ml  COCAINE METABOLITES............300 ng/ml  BENZODIAZEPINE.................300 ng/ml  PCP.............................25 ng/ml  OPIATE.........................300 ng/ml  AMPHETAMINE/ECSTASY...........1000 ng/ml  BARBITURATE....................200 ng/ml  OXYCODONE......................100  ng/ml  FENTANYL.........................5 ng/ml       URINALYSIS WITH REFLEX CULTURE AND MICROSCOPIC    Narrative:     The following orders were created for panel order Urinalysis with Reflex Culture and Microscopic.  Procedure                               Abnormality         Status                     ---------                               -----------         ------                     Urinalysis with Reflex C...[[id number]]  Abnormal            Final result               Extra Urine Gray Tube[[id number]]                            In process                   Please view results for these tests on the individual orders.   SARS-COV-2 PCR   EXTRA URINE GRAY TUBE   MORPHOLOGY    RBC Morphology No significant RBC morphology present        No orders to display        Considerations/further MDM:  Patient was seen in conjucntion with my supervising physician,  Dr. Dunlap. Please refer to her note.    Patient presents with elevated heart rate 103 bpm, afebrile 98.1 °F, blood pressure 131/90, respirations 20, 99% on room air    Patient initially presented without acute withdrawal symptoms.  [facility] evaluation was ordered for inpatient psychiatric care.  EPAT consult was placed for  to come and evaluate patient.  Patient's blood alcohol level was quite elevated.  CMP shows elevated AST, ALT total bilirubin and alk phos.  There has been elevations on previous CMP's but more significant today.  Urinalysis shows evidence of dehydration.  CBC shows no leukocytosis or anemia.  Negative urine drug screen.  During stay in the emergency room.  Became agitated and CIWA was elevated.  Therefore patient was given phenobarbital 130 mg IV.  He was given a second dose 2 hours later due to continued agitation.  Along with phenobarbital, patient received IV normal saline, folic acid, multivitamin and thiamine.  Patient has not been evaluated by  yet as he is acutely intoxicated.  At the end of my shift, the  patient remained stable in the emergency department.  His vital signs have been stable.  He has been calm.  He was also treated with Zofran and famotidine for nausea.  Please refer to my supervising physician for further management and disposition of this patient while he remains in the emergency department.      Procedure  Procedures     Lauryn Alvarado PA-C  05/23/24 0105

## 2024-05-28 LAB
ATRIAL RATE: 87 BPM
P AXIS: 68 DEGREES
P OFFSET: 211 MS
P ONSET: 142 MS
PR INTERVAL: 156 MS
Q ONSET: 220 MS
QRS COUNT: 14 BEATS
QRS DURATION: 82 MS
QT INTERVAL: 368 MS
QTC CALCULATION(BAZETT): 442 MS
QTC FREDERICIA: 416 MS
R AXIS: 72 DEGREES
T AXIS: -8 DEGREES
T OFFSET: 404 MS
VENTRICULAR RATE: 87 BPM

## 2024-08-26 ENCOUNTER — APPOINTMENT (OUTPATIENT)
Dept: CARDIOLOGY | Facility: HOSPITAL | Age: 69
DRG: 369 | End: 2024-08-26
Payer: MEDICARE

## 2024-08-26 ENCOUNTER — HOSPITAL ENCOUNTER (INPATIENT)
Facility: HOSPITAL | Age: 69
DRG: 369 | End: 2024-08-26
Attending: STUDENT IN AN ORGANIZED HEALTH CARE EDUCATION/TRAINING PROGRAM | Admitting: STUDENT IN AN ORGANIZED HEALTH CARE EDUCATION/TRAINING PROGRAM
Payer: MEDICARE

## 2024-08-26 ENCOUNTER — APPOINTMENT (OUTPATIENT)
Dept: RADIOLOGY | Facility: HOSPITAL | Age: 69
DRG: 369 | End: 2024-08-26
Payer: MEDICARE

## 2024-08-26 ENCOUNTER — APPOINTMENT (OUTPATIENT)
Dept: GASTROENTEROLOGY | Facility: HOSPITAL | Age: 69
DRG: 369 | End: 2024-08-26
Payer: MEDICARE

## 2024-08-26 DIAGNOSIS — K92.0 HEMATEMESIS WITH NAUSEA: Primary | ICD-10-CM

## 2024-08-26 DIAGNOSIS — R57.1 HYPOVOLEMIC SHOCK (MULTI): ICD-10-CM

## 2024-08-26 DIAGNOSIS — K70.31 ALCOHOLIC CIRRHOSIS OF LIVER WITH ASCITES (MULTI): ICD-10-CM

## 2024-08-26 LAB
ABO GROUP (TYPE) IN BLOOD: NORMAL
ABO GROUP (TYPE) IN BLOOD: NORMAL
ALBUMIN SERPL-MCNC: 2.9 G/DL (ref 3.5–5)
ALBUMIN SERPL-MCNC: 3.4 G/DL (ref 3.5–5)
ALP BLD-CCNC: 66 U/L (ref 35–125)
ALT SERPL-CCNC: 79 U/L (ref 5–40)
ANION GAP SERPL CALC-SCNC: 11 MMOL/L
ANION GAP SERPL CALC-SCNC: >19 MMOL/L
ANTIBODY SCREEN: NORMAL
AORTIC VALVE PEAK VELOCITY: 1.77 M/S
APTT PPP: 28 SECONDS (ref 22–32.5)
AST SERPL-CCNC: 152 U/L (ref 5–40)
AV PEAK GRADIENT: 12.5 MMHG
AVA (PEAK VEL): 2.94 CM2
BASOPHILS # BLD AUTO: 0.01 X10*3/UL (ref 0–0.1)
BASOPHILS # BLD AUTO: 0.02 X10*3/UL (ref 0–0.1)
BASOPHILS # BLD MANUAL: 0 X10*3/UL (ref 0–0.1)
BASOPHILS NFR BLD AUTO: 0.2 %
BASOPHILS NFR BLD AUTO: 0.5 %
BASOPHILS NFR BLD MANUAL: 0 %
BILIRUB SERPL-MCNC: 6.5 MG/DL (ref 0.1–1.2)
BLOOD EXPIRATION DATE: NORMAL
BUN SERPL-MCNC: 15 MG/DL (ref 8–25)
BUN SERPL-MCNC: 24 MG/DL (ref 8–25)
BURR CELLS BLD QL SMEAR: ABNORMAL
CALCIUM SERPL-MCNC: 8.2 MG/DL (ref 8.5–10.4)
CALCIUM SERPL-MCNC: 9.6 MG/DL (ref 8.5–10.4)
CHLORIDE SERPL-SCNC: 101 MMOL/L (ref 97–107)
CHLORIDE SERPL-SCNC: 92 MMOL/L (ref 97–107)
CO2 SERPL-SCNC: 21 MMOL/L (ref 24–31)
CO2 SERPL-SCNC: 23 MMOL/L (ref 24–31)
CREAT SERPL-MCNC: 0.5 MG/DL (ref 0.4–1.6)
CREAT SERPL-MCNC: 0.5 MG/DL (ref 0.4–1.6)
DISPENSE STATUS: NORMAL
EGFRCR SERPLBLD CKD-EPI 2021: >90 ML/MIN/1.73M*2
EGFRCR SERPLBLD CKD-EPI 2021: >90 ML/MIN/1.73M*2
EJECTION FRACTION APICAL 4 CHAMBER: 55.9
EJECTION FRACTION: 63 %
EOSINOPHIL # BLD AUTO: 0.08 X10*3/UL (ref 0–0.7)
EOSINOPHIL # BLD AUTO: 0.09 X10*3/UL (ref 0–0.7)
EOSINOPHIL # BLD MANUAL: 0 X10*3/UL (ref 0–0.7)
EOSINOPHIL NFR BLD AUTO: 1.9 %
EOSINOPHIL NFR BLD AUTO: 2 %
EOSINOPHIL NFR BLD MANUAL: 0 %
ERYTHROCYTE [DISTWIDTH] IN BLOOD BY AUTOMATED COUNT: 13.6 % (ref 11.5–14.5)
ERYTHROCYTE [DISTWIDTH] IN BLOOD BY AUTOMATED COUNT: 15.8 % (ref 11.5–14.5)
ERYTHROCYTE [DISTWIDTH] IN BLOOD BY AUTOMATED COUNT: 16.1 % (ref 11.5–14.5)
GLOBAL LONGITUDINAL STRAIN: 21 %
GLUCOSE BLD MANUAL STRIP-MCNC: 101 MG/DL (ref 74–99)
GLUCOSE BLD MANUAL STRIP-MCNC: 102 MG/DL (ref 74–99)
GLUCOSE BLD MANUAL STRIP-MCNC: 104 MG/DL (ref 74–99)
GLUCOSE BLD MANUAL STRIP-MCNC: 117 MG/DL (ref 74–99)
GLUCOSE BLD MANUAL STRIP-MCNC: 124 MG/DL (ref 74–99)
GLUCOSE SERPL-MCNC: 142 MG/DL (ref 65–99)
GLUCOSE SERPL-MCNC: 99 MG/DL (ref 65–99)
HCT VFR BLD AUTO: 16.3 % (ref 41–52)
HCT VFR BLD AUTO: 23.1 % (ref 41–52)
HCT VFR BLD AUTO: 24.1 % (ref 41–52)
HCT VFR BLD AUTO: 28.6 % (ref 41–52)
HGB BLD-MCNC: 10.2 G/DL (ref 13.5–17.5)
HGB BLD-MCNC: 5.5 G/DL (ref 13.5–17.5)
HGB BLD-MCNC: 8.2 G/DL (ref 13.5–17.5)
HGB BLD-MCNC: 8.3 G/DL (ref 13.5–17.5)
IMM GRANULOCYTES # BLD AUTO: 0.01 X10*3/UL (ref 0–0.7)
IMM GRANULOCYTES # BLD AUTO: 0.02 X10*3/UL (ref 0–0.7)
IMM GRANULOCYTES # BLD AUTO: 0.02 X10*3/UL (ref 0–0.7)
IMM GRANULOCYTES NFR BLD AUTO: 0.2 % (ref 0–0.9)
IMM GRANULOCYTES NFR BLD AUTO: 0.3 % (ref 0–0.9)
IMM GRANULOCYTES NFR BLD AUTO: 0.5 % (ref 0–0.9)
INR PPP: 1.9 (ref 0.9–1.2)
INR PPP: 2 (ref 0.9–1.2)
LEFT ATRIUM VOLUME AREA LENGTH INDEX BSA: 51.9 ML/M2
LEFT VENTRICLE INTERNAL DIMENSION DIASTOLE: 4.33 CM (ref 3.5–6)
LEFT VENTRICULAR OUTFLOW TRACT DIAMETER: 2.37 CM
LIPASE SERPL-CCNC: 33 U/L (ref 16–63)
LV EJECTION FRACTION BIPLANE: 55 %
LYMPHOCYTES # BLD AUTO: 1.33 X10*3/UL (ref 1.2–4.8)
LYMPHOCYTES # BLD AUTO: 1.44 X10*3/UL (ref 1.2–4.8)
LYMPHOCYTES # BLD MANUAL: 1.08 X10*3/UL (ref 1.2–4.8)
LYMPHOCYTES NFR BLD AUTO: 30.9 %
LYMPHOCYTES NFR BLD AUTO: 32.1 %
LYMPHOCYTES NFR BLD MANUAL: 18 %
MAGNESIUM SERPL-MCNC: 1.3 MG/DL (ref 1.6–3.1)
MCH RBC QN AUTO: 31.2 PG (ref 26–34)
MCH RBC QN AUTO: 31.8 PG (ref 26–34)
MCH RBC QN AUTO: 32.8 PG (ref 26–34)
MCHC RBC AUTO-ENTMCNC: 34.4 G/DL (ref 32–36)
MCHC RBC AUTO-ENTMCNC: 35.5 G/DL (ref 32–36)
MCHC RBC AUTO-ENTMCNC: 35.7 G/DL (ref 32–36)
MCV RBC AUTO: 90 FL (ref 80–100)
MCV RBC AUTO: 91 FL (ref 80–100)
MCV RBC AUTO: 92 FL (ref 80–100)
METAMYELOCYTES # BLD MANUAL: 0.06 X10*3/UL
METAMYELOCYTES NFR BLD MANUAL: 1 %
MITRAL VALVE E/A RATIO: 1.29
MITRAL VALVE E/E' RATIO: 6.75
MONOCYTES # BLD AUTO: 0.52 X10*3/UL (ref 0.1–1)
MONOCYTES # BLD AUTO: 0.57 X10*3/UL (ref 0.1–1)
MONOCYTES # BLD MANUAL: 0.6 X10*3/UL (ref 0.1–1)
MONOCYTES NFR BLD AUTO: 11.6 %
MONOCYTES NFR BLD AUTO: 13.2 %
MONOCYTES NFR BLD MANUAL: 10 %
NEUTROPHILS # BLD AUTO: 2.29 X10*3/UL (ref 1.2–7.7)
NEUTROPHILS # BLD AUTO: 2.41 X10*3/UL (ref 1.2–7.7)
NEUTROPHILS # BLD MANUAL: 4.26 X10*3/UL (ref 1.2–7.7)
NEUTROPHILS NFR BLD AUTO: 53 %
NEUTROPHILS NFR BLD AUTO: 53.9 %
NEUTS BAND # BLD MANUAL: 0.06 X10*3/UL (ref 0–0.7)
NEUTS BAND NFR BLD MANUAL: 1 %
NEUTS SEG # BLD MANUAL: 4.2 X10*3/UL (ref 1.2–7)
NEUTS SEG NFR BLD MANUAL: 70 %
NRBC BLD-RTO: 0 /100 WBCS (ref 0–0)
PATH REVIEW-CBC DIFFERENTIAL: NORMAL
PHOSPHATE SERPL-MCNC: 1.4 MG/DL (ref 2.5–4.5)
PLATELET # BLD AUTO: 31 X10*3/UL (ref 150–450)
PLATELET # BLD AUTO: 32 X10*3/UL (ref 150–450)
PLATELET # BLD AUTO: 40 X10*3/UL (ref 150–450)
POTASSIUM SERPL-SCNC: 3 MMOL/L (ref 3.4–5.1)
POTASSIUM SERPL-SCNC: 3.6 MMOL/L (ref 3.4–5.1)
PRODUCT BLOOD TYPE: 1700
PRODUCT BLOOD TYPE: 1700
PRODUCT BLOOD TYPE: 2800
PRODUCT BLOOD TYPE: 8400
PRODUCT CODE: NORMAL
PROT SERPL-MCNC: 5.3 G/DL (ref 5.9–7.9)
PROTHROMBIN TIME: 18.9 SECONDS (ref 9.3–12.7)
PROTHROMBIN TIME: 19.8 SECONDS (ref 9.3–12.7)
RBC # BLD AUTO: 2.58 X10*6/UL (ref 4.5–5.9)
RBC # BLD AUTO: 2.66 X10*6/UL (ref 4.5–5.9)
RBC # BLD AUTO: 3.11 X10*6/UL (ref 4.5–5.9)
RBC MORPH BLD: ABNORMAL
RH FACTOR (ANTIGEN D): NORMAL
RH FACTOR (ANTIGEN D): NORMAL
RIGHT VENTRICLE FREE WALL PEAK S': 22.47 CM/S
RIGHT VENTRICLE PEAK SYSTOLIC PRESSURE: 29.7 MMHG
SODIUM SERPL-SCNC: 133 MMOL/L (ref 133–145)
SODIUM SERPL-SCNC: 135 MMOL/L (ref 133–145)
TOTAL CELLS COUNTED BLD: 100
TRICUSPID ANNULAR PLANE SYSTOLIC EXCURSION: 2 CM
UNIT ABO: NORMAL
UNIT NUMBER: NORMAL
UNIT RH: NORMAL
UNIT VOLUME: 200
UNIT VOLUME: 245
UNIT VOLUME: 296
UNIT VOLUME: 350
WBC # BLD AUTO: 4.3 X10*3/UL (ref 4.4–11.3)
WBC # BLD AUTO: 4.5 X10*3/UL (ref 4.4–11.3)
WBC # BLD AUTO: 6 X10*3/UL (ref 4.4–11.3)
XM INTEP: NORMAL
XM INTEP: NORMAL

## 2024-08-26 PROCEDURE — 82947 ASSAY GLUCOSE BLOOD QUANT: CPT

## 2024-08-26 PROCEDURE — 36430 TRANSFUSION BLD/BLD COMPNT: CPT

## 2024-08-26 PROCEDURE — 2500000004 HC RX 250 GENERAL PHARMACY W/ HCPCS (ALT 636 FOR OP/ED): Performed by: ANESTHESIOLOGY

## 2024-08-26 PROCEDURE — 2500000004 HC RX 250 GENERAL PHARMACY W/ HCPCS (ALT 636 FOR OP/ED): Performed by: STUDENT IN AN ORGANIZED HEALTH CARE EDUCATION/TRAINING PROGRAM

## 2024-08-26 PROCEDURE — 96361 HYDRATE IV INFUSION ADD-ON: CPT

## 2024-08-26 PROCEDURE — 86920 COMPATIBILITY TEST SPIN: CPT

## 2024-08-26 PROCEDURE — 74174 CTA ABD&PLVS W/CONTRAST: CPT | Performed by: RADIOLOGY

## 2024-08-26 PROCEDURE — P9017 PLASMA 1 DONOR FRZ W/IN 8 HR: HCPCS

## 2024-08-26 PROCEDURE — 2020000001 HC ICU ROOM DAILY

## 2024-08-26 PROCEDURE — 74174 CTA ABD&PLVS W/CONTRAST: CPT

## 2024-08-26 PROCEDURE — 83690 ASSAY OF LIPASE: CPT | Performed by: STUDENT IN AN ORGANIZED HEALTH CARE EDUCATION/TRAINING PROGRAM

## 2024-08-26 PROCEDURE — 93356 MYOCRD STRAIN IMG SPCKL TRCK: CPT | Performed by: INTERNAL MEDICINE

## 2024-08-26 PROCEDURE — 80069 RENAL FUNCTION PANEL: CPT | Mod: CCI

## 2024-08-26 PROCEDURE — 96365 THER/PROPH/DIAG IV INF INIT: CPT

## 2024-08-26 PROCEDURE — 93356 MYOCRD STRAIN IMG SPCKL TRCK: CPT

## 2024-08-26 PROCEDURE — 85025 COMPLETE CBC W/AUTO DIFF WBC: CPT

## 2024-08-26 PROCEDURE — C1751 CATH, INF, PER/CENT/MIDLINE: HCPCS

## 2024-08-26 PROCEDURE — 36410 VNPNXR 3YR/> PHY/QHP DX/THER: CPT

## 2024-08-26 PROCEDURE — 2500000005 HC RX 250 GENERAL PHARMACY W/O HCPCS

## 2024-08-26 PROCEDURE — 2550000001 HC RX 255 CONTRASTS: Performed by: ANESTHESIOLOGY

## 2024-08-26 PROCEDURE — 2550000001 HC RX 255 CONTRASTS: Performed by: STUDENT IN AN ORGANIZED HEALTH CARE EDUCATION/TRAINING PROGRAM

## 2024-08-26 PROCEDURE — 2720000007 HC OR 272 NO HCPCS

## 2024-08-26 PROCEDURE — 99291 CRITICAL CARE FIRST HOUR: CPT

## 2024-08-26 PROCEDURE — P9016 RBC LEUKOCYTES REDUCED: HCPCS

## 2024-08-26 PROCEDURE — 93306 TTE W/DOPPLER COMPLETE: CPT | Performed by: INTERNAL MEDICINE

## 2024-08-26 PROCEDURE — P9073 PLATELETS PHERESIS PATH REDU: HCPCS

## 2024-08-26 PROCEDURE — 2500000004 HC RX 250 GENERAL PHARMACY W/ HCPCS (ALT 636 FOR OP/ED): Performed by: NURSE PRACTITIONER

## 2024-08-26 PROCEDURE — 2780000003 HC OR 278 NO HCPCS

## 2024-08-26 PROCEDURE — 74177 CT ABD & PELVIS W/CONTRAST: CPT | Performed by: STUDENT IN AN ORGANIZED HEALTH CARE EDUCATION/TRAINING PROGRAM

## 2024-08-26 PROCEDURE — 85014 HEMATOCRIT: CPT | Performed by: INTERNAL MEDICINE

## 2024-08-26 PROCEDURE — 31500 INSERT EMERGENCY AIRWAY: CPT

## 2024-08-26 PROCEDURE — 85007 BL SMEAR W/DIFF WBC COUNT: CPT | Performed by: STUDENT IN AN ORGANIZED HEALTH CARE EDUCATION/TRAINING PROGRAM

## 2024-08-26 PROCEDURE — 0W3P8ZZ CONTROL BLEEDING IN GASTROINTESTINAL TRACT, VIA NATURAL OR ARTIFICIAL OPENING ENDOSCOPIC: ICD-10-PCS | Performed by: INTERNAL MEDICINE

## 2024-08-26 PROCEDURE — 2500000004 HC RX 250 GENERAL PHARMACY W/ HCPCS (ALT 636 FOR OP/ED)

## 2024-08-26 PROCEDURE — 96375 TX/PRO/DX INJ NEW DRUG ADDON: CPT

## 2024-08-26 PROCEDURE — 99157 MOD SED OTHER PHYS/QHP EA: CPT | Performed by: ANESTHESIOLOGY

## 2024-08-26 PROCEDURE — 43255 EGD CONTROL BLEEDING ANY: CPT

## 2024-08-26 PROCEDURE — 85610 PROTHROMBIN TIME: CPT

## 2024-08-26 PROCEDURE — 99156 MOD SED OTH PHYS/QHP 5/>YRS: CPT | Performed by: ANESTHESIOLOGY

## 2024-08-26 PROCEDURE — 80053 COMPREHEN METABOLIC PANEL: CPT | Performed by: STUDENT IN AN ORGANIZED HEALTH CARE EDUCATION/TRAINING PROGRAM

## 2024-08-26 PROCEDURE — 85610 PROTHROMBIN TIME: CPT | Performed by: STUDENT IN AN ORGANIZED HEALTH CARE EDUCATION/TRAINING PROGRAM

## 2024-08-26 PROCEDURE — 93005 ELECTROCARDIOGRAM TRACING: CPT

## 2024-08-26 PROCEDURE — 37799 UNLISTED PX VASCULAR SURGERY: CPT

## 2024-08-26 PROCEDURE — 86901 BLOOD TYPING SEROLOGIC RH(D): CPT | Performed by: STUDENT IN AN ORGANIZED HEALTH CARE EDUCATION/TRAINING PROGRAM

## 2024-08-26 PROCEDURE — 74177 CT ABD & PELVIS W/CONTRAST: CPT

## 2024-08-26 PROCEDURE — 2500000005 HC RX 250 GENERAL PHARMACY W/O HCPCS: Performed by: ANESTHESIOLOGY

## 2024-08-26 PROCEDURE — 94002 VENT MGMT INPAT INIT DAY: CPT

## 2024-08-26 PROCEDURE — 36415 COLL VENOUS BLD VENIPUNCTURE: CPT | Performed by: STUDENT IN AN ORGANIZED HEALTH CARE EDUCATION/TRAINING PROGRAM

## 2024-08-26 PROCEDURE — 31500 INSERT EMERGENCY AIRWAY: CPT | Performed by: ANESTHESIOLOGY

## 2024-08-26 PROCEDURE — 36415 COLL VENOUS BLD VENIPUNCTURE: CPT | Performed by: INTERNAL MEDICINE

## 2024-08-26 PROCEDURE — 85027 COMPLETE CBC AUTOMATED: CPT | Performed by: STUDENT IN AN ORGANIZED HEALTH CARE EDUCATION/TRAINING PROGRAM

## 2024-08-26 PROCEDURE — 83735 ASSAY OF MAGNESIUM: CPT

## 2024-08-26 RX ORDER — ONDANSETRON HYDROCHLORIDE 2 MG/ML
4 INJECTION, SOLUTION INTRAVENOUS EVERY 8 HOURS PRN
Status: DISCONTINUED | OUTPATIENT
Start: 2024-08-26 | End: 2024-09-02 | Stop reason: HOSPADM

## 2024-08-26 RX ORDER — ONDANSETRON 4 MG/1
4 TABLET, ORALLY DISINTEGRATING ORAL EVERY 8 HOURS PRN
Status: DISCONTINUED | OUTPATIENT
Start: 2024-08-26 | End: 2024-09-02 | Stop reason: HOSPADM

## 2024-08-26 RX ORDER — PANTOPRAZOLE SODIUM 40 MG/1
40 TABLET, DELAYED RELEASE ORAL 2 TIMES DAILY
Status: DISCONTINUED | OUTPATIENT
Start: 2024-08-26 | End: 2024-09-02 | Stop reason: HOSPADM

## 2024-08-26 RX ORDER — CALCIUM GLUCONATE 20 MG/ML
2 INJECTION, SOLUTION INTRAVENOUS ONCE
Status: COMPLETED | OUTPATIENT
Start: 2024-08-26 | End: 2024-08-26

## 2024-08-26 RX ORDER — LIDOCAINE HYDROCHLORIDE 10 MG/ML
5 INJECTION INFILTRATION; PERINEURAL ONCE
Status: COMPLETED | OUTPATIENT
Start: 2024-08-26 | End: 2024-08-26

## 2024-08-26 RX ORDER — PANTOPRAZOLE SODIUM 40 MG/10ML
40 INJECTION, POWDER, LYOPHILIZED, FOR SOLUTION INTRAVENOUS 2 TIMES DAILY
Status: DISCONTINUED | OUTPATIENT
Start: 2024-08-26 | End: 2024-09-02 | Stop reason: HOSPADM

## 2024-08-26 RX ORDER — SODIUM CHLORIDE, SODIUM LACTATE, POTASSIUM CHLORIDE, CALCIUM CHLORIDE 600; 310; 30; 20 MG/100ML; MG/100ML; MG/100ML; MG/100ML
75 INJECTION, SOLUTION INTRAVENOUS CONTINUOUS
Status: DISCONTINUED | OUTPATIENT
Start: 2024-08-26 | End: 2024-08-28

## 2024-08-26 RX ORDER — INSULIN LISPRO 100 [IU]/ML
0-5 INJECTION, SOLUTION INTRAVENOUS; SUBCUTANEOUS EVERY 4 HOURS
Status: DISCONTINUED | OUTPATIENT
Start: 2024-08-26 | End: 2024-08-26

## 2024-08-26 RX ORDER — PANTOPRAZOLE SODIUM 40 MG/10ML
40 INJECTION, POWDER, LYOPHILIZED, FOR SOLUTION INTRAVENOUS ONCE
Status: COMPLETED | OUTPATIENT
Start: 2024-08-26 | End: 2024-08-26

## 2024-08-26 RX ORDER — FOLIC ACID 1 MG/1
1 TABLET ORAL DAILY
Status: DISCONTINUED | OUTPATIENT
Start: 2024-08-26 | End: 2024-09-02 | Stop reason: HOSPADM

## 2024-08-26 RX ORDER — PROPOFOL 10 MG/ML
100 INJECTION, EMULSION INTRAVENOUS ONCE
Status: COMPLETED | OUTPATIENT
Start: 2024-08-26 | End: 2024-08-26

## 2024-08-26 RX ORDER — MULTIVIT-MIN/IRON FUM/FOLIC AC 7.5 MG-4
1 TABLET ORAL DAILY
Status: DISCONTINUED | OUTPATIENT
Start: 2024-08-26 | End: 2024-09-02 | Stop reason: HOSPADM

## 2024-08-26 RX ORDER — HALOPERIDOL 5 MG/ML
2 INJECTION INTRAMUSCULAR ONCE
Status: COMPLETED | OUTPATIENT
Start: 2024-08-26 | End: 2024-08-26

## 2024-08-26 RX ORDER — DEXTROSE 50 % IN WATER (D50W) INTRAVENOUS SYRINGE
25
Status: DISCONTINUED | OUTPATIENT
Start: 2024-08-26 | End: 2024-09-02 | Stop reason: HOSPADM

## 2024-08-26 RX ORDER — THIAMINE HYDROCHLORIDE 100 MG/ML
100 INJECTION, SOLUTION INTRAMUSCULAR; INTRAVENOUS DAILY
Status: COMPLETED | OUTPATIENT
Start: 2024-08-26 | End: 2024-08-28

## 2024-08-26 RX ORDER — INSULIN LISPRO 100 [IU]/ML
0-5 INJECTION, SOLUTION INTRAVENOUS; SUBCUTANEOUS EVERY 4 HOURS
Status: DISCONTINUED | OUTPATIENT
Start: 2024-08-26 | End: 2024-08-27

## 2024-08-26 RX ORDER — CEFTRIAXONE 1 G/50ML
1 INJECTION, SOLUTION INTRAVENOUS ONCE
Status: COMPLETED | OUTPATIENT
Start: 2024-08-26 | End: 2024-08-26

## 2024-08-26 RX ORDER — CALCIUM GLUCONATE 20 MG/ML
2 INJECTION, SOLUTION INTRAVENOUS ONCE
Status: DISCONTINUED | OUTPATIENT
Start: 2024-08-26 | End: 2024-08-26

## 2024-08-26 RX ORDER — PROPOFOL 10 MG/ML
0-50 INJECTION, EMULSION INTRAVENOUS CONTINUOUS
Status: DISCONTINUED | OUTPATIENT
Start: 2024-08-26 | End: 2024-08-27

## 2024-08-26 RX ORDER — PROPOFOL 10 MG/ML
50 INJECTION, EMULSION INTRAVENOUS ONCE
Status: COMPLETED | OUTPATIENT
Start: 2024-08-26 | End: 2024-08-26

## 2024-08-26 RX ORDER — ESOMEPRAZOLE MAGNESIUM 40 MG/1
40 GRANULE, DELAYED RELEASE ORAL 2 TIMES DAILY
Status: DISCONTINUED | OUTPATIENT
Start: 2024-08-26 | End: 2024-09-02 | Stop reason: HOSPADM

## 2024-08-26 RX ORDER — OCTREOTIDE ACETATE 50 UG/ML
50 INJECTION, SOLUTION INTRAVENOUS; SUBCUTANEOUS DAILY
Status: DISCONTINUED | OUTPATIENT
Start: 2024-08-26 | End: 2024-08-26

## 2024-08-26 RX ORDER — POTASSIUM CHLORIDE 14.9 MG/ML
20 INJECTION INTRAVENOUS
Status: COMPLETED | OUTPATIENT
Start: 2024-08-26 | End: 2024-08-27

## 2024-08-26 RX ORDER — METOCLOPRAMIDE HYDROCHLORIDE 5 MG/ML
10 INJECTION INTRAMUSCULAR; INTRAVENOUS ONCE
Status: COMPLETED | OUTPATIENT
Start: 2024-08-26 | End: 2024-08-26

## 2024-08-26 RX ORDER — MAGNESIUM SULFATE HEPTAHYDRATE 40 MG/ML
2 INJECTION, SOLUTION INTRAVENOUS ONCE
Status: COMPLETED | OUTPATIENT
Start: 2024-08-26 | End: 2024-08-26

## 2024-08-26 RX ORDER — ETOMIDATE 2 MG/ML
16 INJECTION INTRAVENOUS ONCE
Status: COMPLETED | OUTPATIENT
Start: 2024-08-26 | End: 2024-08-26

## 2024-08-26 RX ORDER — SUCCINYLCHOLINE CHLORIDE 20 MG/ML
100 INJECTION INTRAMUSCULAR; INTRAVENOUS ONCE
Status: COMPLETED | OUTPATIENT
Start: 2024-08-26 | End: 2024-08-26

## 2024-08-26 RX ORDER — DEXTROSE 50 % IN WATER (D50W) INTRAVENOUS SYRINGE
12.5
Status: DISCONTINUED | OUTPATIENT
Start: 2024-08-26 | End: 2024-09-02 | Stop reason: HOSPADM

## 2024-08-26 RX ORDER — LANOLIN ALCOHOL/MO/W.PET/CERES
100 CREAM (GRAM) TOPICAL DAILY
Status: DISCONTINUED | OUTPATIENT
Start: 2024-08-29 | End: 2024-09-02 | Stop reason: HOSPADM

## 2024-08-26 SDOH — ECONOMIC STABILITY: HOUSING INSECURITY: IN THE PAST 12 MONTHS, HOW MANY TIMES HAVE YOU MOVED WHERE YOU WERE LIVING?: 0

## 2024-08-26 SDOH — SOCIAL STABILITY: SOCIAL NETWORK: HOW OFTEN DO YOU GET TOGETHER WITH FRIENDS OR RELATIVES?: NEVER

## 2024-08-26 SDOH — HEALTH STABILITY: MENTAL HEALTH
STRESS IS WHEN SOMEONE FEELS TENSE, NERVOUS, ANXIOUS, OR CAN'T SLEEP AT NIGHT BECAUSE THEIR MIND IS TROUBLED. HOW STRESSED ARE YOU?: RATHER MUCH

## 2024-08-26 SDOH — ECONOMIC STABILITY: TRANSPORTATION INSECURITY
IN THE PAST 12 MONTHS, HAS LACK OF TRANSPORTATION KEPT YOU FROM MEETINGS, WORK, OR FROM GETTING THINGS NEEDED FOR DAILY LIVING?: NO

## 2024-08-26 SDOH — SOCIAL STABILITY: SOCIAL NETWORK
DO YOU BELONG TO ANY CLUBS OR ORGANIZATIONS SUCH AS CHURCH GROUPS UNIONS, FRATERNAL OR ATHLETIC GROUPS, OR SCHOOL GROUPS?: NO

## 2024-08-26 SDOH — SOCIAL STABILITY: SOCIAL INSECURITY: WITHIN THE LAST YEAR, HAVE YOU BEEN HUMILIATED OR EMOTIONALLY ABUSED IN OTHER WAYS BY YOUR PARTNER OR EX-PARTNER?: NO

## 2024-08-26 SDOH — HEALTH STABILITY: MENTAL HEALTH
HOW OFTEN DO YOU NEED TO HAVE SOMEONE HELP YOU WHEN YOU READ INSTRUCTIONS, PAMPHLETS, OR OTHER WRITTEN MATERIAL FROM YOUR DOCTOR OR PHARMACY?: NEVER

## 2024-08-26 SDOH — SOCIAL STABILITY: SOCIAL NETWORK: HOW OFTEN DO YOU ATTEND CHURCH OR RELIGIOUS SERVICES?: NEVER

## 2024-08-26 SDOH — ECONOMIC STABILITY: INCOME INSECURITY: IN THE PAST 12 MONTHS, HAS THE ELECTRIC, GAS, OIL, OR WATER COMPANY THREATENED TO SHUT OFF SERVICE IN YOUR HOME?: NO

## 2024-08-26 SDOH — ECONOMIC STABILITY: INCOME INSECURITY: IN THE LAST 12 MONTHS, WAS THERE A TIME WHEN YOU WERE NOT ABLE TO PAY THE MORTGAGE OR RENT ON TIME?: NO

## 2024-08-26 SDOH — ECONOMIC STABILITY: INCOME INSECURITY: HOW HARD IS IT FOR YOU TO PAY FOR THE VERY BASICS LIKE FOOD, HOUSING, MEDICAL CARE, AND HEATING?: NOT VERY HARD

## 2024-08-26 SDOH — SOCIAL STABILITY: SOCIAL NETWORK: ARE YOU MARRIED, WIDOWED, DIVORCED, SEPARATED, NEVER MARRIED, OR LIVING WITH A PARTNER?: WIDOWED

## 2024-08-26 SDOH — HEALTH STABILITY: PHYSICAL HEALTH: ON AVERAGE, HOW MANY DAYS PER WEEK DO YOU ENGAGE IN MODERATE TO STRENUOUS EXERCISE (LIKE A BRISK WALK)?: 0 DAYS

## 2024-08-26 SDOH — SOCIAL STABILITY: SOCIAL INSECURITY
WITHIN THE LAST YEAR, HAVE YOU BEEN KICKED, HIT, SLAPPED, OR OTHERWISE PHYSICALLY HURT BY YOUR PARTNER OR EX-PARTNER?: NO

## 2024-08-26 SDOH — SOCIAL STABILITY: SOCIAL INSECURITY: WITHIN THE LAST YEAR, HAVE YOU BEEN AFRAID OF YOUR PARTNER OR EX-PARTNER?: NO

## 2024-08-26 SDOH — ECONOMIC STABILITY: FOOD INSECURITY: WITHIN THE PAST 12 MONTHS, THE FOOD YOU BOUGHT JUST DIDN'T LAST AND YOU DIDN'T HAVE MONEY TO GET MORE.: NEVER TRUE

## 2024-08-26 SDOH — ECONOMIC STABILITY: HOUSING INSECURITY: AT ANY TIME IN THE PAST 12 MONTHS, WERE YOU HOMELESS OR LIVING IN A SHELTER (INCLUDING NOW)?: NO

## 2024-08-26 SDOH — SOCIAL STABILITY: SOCIAL NETWORK: IN A TYPICAL WEEK, HOW MANY TIMES DO YOU TALK ON THE PHONE WITH FAMILY, FRIENDS, OR NEIGHBORS?: ONCE A WEEK

## 2024-08-26 SDOH — HEALTH STABILITY: PHYSICAL HEALTH: ON AVERAGE, HOW MANY MINUTES DO YOU ENGAGE IN EXERCISE AT THIS LEVEL?: 0 MIN

## 2024-08-26 SDOH — SOCIAL STABILITY: SOCIAL INSECURITY
WITHIN THE LAST YEAR, HAVE TO BEEN RAPED OR FORCED TO HAVE ANY KIND OF SEXUAL ACTIVITY BY YOUR PARTNER OR EX-PARTNER?: NO

## 2024-08-26 SDOH — SOCIAL STABILITY: SOCIAL INSECURITY: HAVE YOU HAD THOUGHTS OF HARMING ANYONE ELSE?: NO

## 2024-08-26 SDOH — ECONOMIC STABILITY: TRANSPORTATION INSECURITY
IN THE PAST 12 MONTHS, HAS THE LACK OF TRANSPORTATION KEPT YOU FROM MEDICAL APPOINTMENTS OR FROM GETTING MEDICATIONS?: NO

## 2024-08-26 SDOH — SOCIAL STABILITY: SOCIAL NETWORK: HOW OFTEN DO YOU ATTENT MEETINGS OF THE CLUB OR ORGANIZATION YOU BELONG TO?: NEVER

## 2024-08-26 SDOH — SOCIAL STABILITY: SOCIAL INSECURITY: WERE YOU ABLE TO COMPLETE ALL THE BEHAVIORAL HEALTH SCREENINGS?: YES

## 2024-08-26 SDOH — HEALTH STABILITY: MENTAL HEALTH: EXPERIENCED ANY OF THE FOLLOWING LIFE EVENTS: DEATH OF FAMILY/FRIEND

## 2024-08-26 ASSESSMENT — COGNITIVE AND FUNCTIONAL STATUS - GENERAL
TURNING FROM BACK TO SIDE WHILE IN FLAT BAD: A LITTLE
HELP NEEDED FOR BATHING: A LITTLE
HELP NEEDED FOR BATHING: A LITTLE
PATIENT BASELINE BEDBOUND: NO
DRESSING REGULAR UPPER BODY CLOTHING: A LITTLE
MOBILITY SCORE: 18
WALKING IN HOSPITAL ROOM: A LITTLE
DAILY ACTIVITIY SCORE: 20
WALKING IN HOSPITAL ROOM: A LITTLE
TOILETING: A LITTLE
MOBILITY SCORE: 20
DAILY ACTIVITIY SCORE: 18
DRESSING REGULAR UPPER BODY CLOTHING: A LITTLE
STANDING UP FROM CHAIR USING ARMS: A LITTLE
MOVING FROM LYING ON BACK TO SITTING ON SIDE OF FLAT BED WITH BEDRAILS: A LITTLE
MOVING TO AND FROM BED TO CHAIR: A LITTLE
PERSONAL GROOMING: A LITTLE
DRESSING REGULAR LOWER BODY CLOTHING: A LITTLE
CLIMB 3 TO 5 STEPS WITH RAILING: A LITTLE
EATING MEALS: A LITTLE
TOILETING: A LITTLE
DRESSING REGULAR LOWER BODY CLOTHING: A LITTLE
MOVING TO AND FROM BED TO CHAIR: A LITTLE
CLIMB 3 TO 5 STEPS WITH RAILING: A LITTLE
STANDING UP FROM CHAIR USING ARMS: A LITTLE

## 2024-08-26 ASSESSMENT — PAIN SCALES - GENERAL
PAINLEVEL_OUTOF10: 0 - NO PAIN

## 2024-08-26 ASSESSMENT — ACTIVITIES OF DAILY LIVING (ADL)
ADEQUATE_TO_COMPLETE_ADL: YES
FEEDING YOURSELF: INDEPENDENT
HEARING - LEFT EAR: FUNCTIONAL
HEARING - RIGHT EAR: FUNCTIONAL
JUDGMENT_ADEQUATE_SAFELY_COMPLETE_DAILY_ACTIVITIES: YES
PATIENT'S MEMORY ADEQUATE TO SAFELY COMPLETE DAILY ACTIVITIES?: YES
DRESSING YOURSELF: INDEPENDENT
GROOMING: INDEPENDENT
WALKS IN HOME: INDEPENDENT
BATHING: INDEPENDENT
TOILETING: INDEPENDENT

## 2024-08-26 ASSESSMENT — LIFESTYLE VARIABLES
NAUSEA AND VOMITING: NO NAUSEA AND NO VOMITING
AGITATION: NORMAL ACTIVITY
AGITATION: NORMAL ACTIVITY
NAUSEA AND VOMITING: MILD NAUSEA WITH NO VOMITING
NAUSEA AND VOMITING: NO NAUSEA AND NO VOMITING
AUDIT-C TOTAL SCORE: 0
SKIP TO QUESTIONS 9-10: 1
TREMOR: NO TREMOR
SUBSTANCE_ABUSE_PAST_12_MONTHS: NO
HOW OFTEN DO YOU HAVE A DRINK CONTAINING ALCOHOL: NEVER
HEADACHE, FULLNESS IN HEAD: NOT PRESENT
TOTAL SCORE: 0
PRESCIPTION_ABUSE_PAST_12_MONTHS: NO
HEADACHE, FULLNESS IN HEAD: NOT PRESENT
TOTAL SCORE: 0
AUDITORY DISTURBANCES: NOT PRESENT
NAUSEA AND VOMITING: NO NAUSEA AND NO VOMITING
TREMOR: NO TREMOR
ORIENTATION AND CLOUDING OF SENSORIUM: ORIENTED AND CAN DO SERIAL ADDITIONS
HEADACHE, FULLNESS IN HEAD: NOT PRESENT
AGITATION: NORMAL ACTIVITY
ANXIETY: NO ANXIETY, AT EASE
AGITATION: NORMAL ACTIVITY
PAROXYSMAL SWEATS: NO SWEAT VISIBLE
PAROXYSMAL SWEATS: NO SWEAT VISIBLE
ORIENTATION AND CLOUDING OF SENSORIUM: ORIENTED AND CAN DO SERIAL ADDITIONS
PAROXYSMAL SWEATS: NO SWEAT VISIBLE
AUDITORY DISTURBANCES: NOT PRESENT
AUDITORY DISTURBANCES: NOT PRESENT
VISUAL DISTURBANCES: NOT PRESENT
TREMOR: NO TREMOR
ORIENTATION AND CLOUDING OF SENSORIUM: ORIENTED AND CAN DO SERIAL ADDITIONS
TOTAL SCORE: 1
HOW MANY STANDARD DRINKS CONTAINING ALCOHOL DO YOU HAVE ON A TYPICAL DAY: PATIENT DOES NOT DRINK
ANXIETY: MILDLY ANXIOUS
ORIENTATION AND CLOUDING OF SENSORIUM: ORIENTED AND CAN DO SERIAL ADDITIONS
ANXIETY: NO ANXIETY, AT EASE
VISUAL DISTURBANCES: NOT PRESENT
ANXIETY: NO ANXIETY, AT EASE
TOTAL SCORE: 1
AUDIT-C TOTAL SCORE: 0
AUDITORY DISTURBANCES: NOT PRESENT
VISUAL DISTURBANCES: NOT PRESENT
TREMOR: NO TREMOR
HEADACHE, FULLNESS IN HEAD: NOT PRESENT
VISUAL DISTURBANCES: NOT PRESENT
HOW OFTEN DO YOU HAVE 6 OR MORE DRINKS ON ONE OCCASION: NEVER
PAROXYSMAL SWEATS: NO SWEAT VISIBLE

## 2024-08-26 ASSESSMENT — PAIN - FUNCTIONAL ASSESSMENT
PAIN_FUNCTIONAL_ASSESSMENT: 0-10

## 2024-08-26 ASSESSMENT — ENCOUNTER SYMPTOMS
WEAKNESS: 1
FEVER: 0
ABDOMINAL DISTENTION: 1
RECTAL PAIN: 0
ABDOMINAL PAIN: 1
FATIGUE: 1
CONSTIPATION: 0
DIARRHEA: 1
BLOOD IN STOOL: 1
NAUSEA: 1
VOMITING: 1
CHILLS: 0

## 2024-08-26 ASSESSMENT — COLUMBIA-SUICIDE SEVERITY RATING SCALE - C-SSRS
6. HAVE YOU EVER DONE ANYTHING, STARTED TO DO ANYTHING, OR PREPARED TO DO ANYTHING TO END YOUR LIFE?: NO
2. HAVE YOU ACTUALLY HAD ANY THOUGHTS OF KILLING YOURSELF?: NO
1. IN THE PAST MONTH, HAVE YOU WISHED YOU WERE DEAD OR WISHED YOU COULD GO TO SLEEP AND NOT WAKE UP?: NO

## 2024-08-26 ASSESSMENT — PATIENT HEALTH QUESTIONNAIRE - PHQ9
2. FEELING DOWN, DEPRESSED OR HOPELESS: SEVERAL DAYS
SUM OF ALL RESPONSES TO PHQ9 QUESTIONS 1 & 2: 1
1. LITTLE INTEREST OR PLEASURE IN DOING THINGS: NOT AT ALL

## 2024-08-26 NOTE — ED PROVIDER NOTES
History of Present Illness     History provided by: Patient  Limitations to History: None  External Records Reviewed with Brief Summary:  Prior inpatient notes which reveal history of alcohol abuse    HPI:  Israel Parikh is a 68 y.o. male presents with possible GI bleed.  Patient states he has had nausea and vomiting for the past several days.  He notes blood within the vomit, described as dark coffee grounds.  Patient also notes multiple episodes of diarrhea, noted to have stool in his pants.  [facility] states [facility] and upon returning home has been too weak to purchase groceries and therefore has not eaten anything in the past several days.  Denies fevers.  No abdominal pain.  No prior history of gastric ulcers.  States that he drinks occasionally, but when he does drink he drinks at least 1 pint.  Last drink was 2 days ago.    Physical Exam   Triage vitals:  T 36.9 °C (98.4 °F)  HR (!) 110  /79  RR 18  O2 98 %      Physical Exam  Vitals and nursing note reviewed.   Constitutional:       General: He is not in acute distress.  HENT:      Head: Normocephalic and atraumatic.      Mouth/Throat:      Mouth: Mucous membranes are dry.      Pharynx: Oropharynx is clear.   Eyes:      Extraocular Movements: Extraocular movements intact.      Conjunctiva/sclera: Conjunctivae normal.      Pupils: Pupils are equal, round, and reactive to light.   Cardiovascular:      Rate and Rhythm: Regular rhythm. Tachycardia present.   Pulmonary:      Effort: Pulmonary effort is normal. No respiratory distress.      Breath sounds: Normal breath sounds.   Abdominal:      General: There is no distension.      Palpations: Abdomen is soft.      Tenderness: There is no abdominal tenderness. There is no guarding or rebound.   Musculoskeletal:         General: No swelling or deformity. Normal range of motion.      Cervical back: Normal range of motion and neck supple.   Skin:     General: Skin is  warm and dry.      Capillary Refill: Capillary refill takes less than 2 seconds.      Coloration: Skin is jaundiced and pale.   Neurological:      General: No focal deficit present.      Mental Status: He is alert and oriented to person, place, and time. Mental status is at baseline.   Psychiatric:         Mood and Affect: Mood normal.         Behavior: Behavior normal.          Medical Decision Making & ED Course   Medical Decision Makin y.o. male presents with suspected GIB.  Reported hematemesis at home, documented history of alcohol abuse.  Considered wide ddx for pt's presentation, including gastritis, colitis, variceal bleed, esophageal rupture, aortic dissection, cardiac ischemia.  Patient with dry heaves and 1 episode of coffee-ground emesis in the ED.  Initially sinus tachycardia to 110, given IV fluids with improvement in heart rate.  Patient stable, GI not emergently consulted.  Patient given IV Protonix, cover with ceftriaxone.  Patient noted to have a hemoglobin drop from 16 to 10 after approximately 3 weeks.  Platelets noted to be 40, elevated LFTs, elevated INR, all of which likely secondary to patient's cirrhosis diagnosis.  CT obtained to evaluate patient's nausea and vomiting and suspected GI bleed.  Evidence of colitis seen, cannot rule out superimposed pancreatitis.  Lipase is currently pending.  Patient is high risk for worsening GI bleeding and will benefit from inpatient GI evaluation.  Patient admitted in stable condition.    I personally spent a total of 45 minutes of critical care time in obtaining history, performing a physical exam, bedside monitoring of interventions, collecting and interpreting tests and discussion with consultants but excluding time spent performing procedures, treating other patients and teaching time.           Clinical Concern GIB       ----  Social Determinants of Health which Significantly Impact Care: None identified     EKG Independent Interpretation: EKG  interpreted by myself. Please see ED Course for full interpretation.    Independent Result Review and Interpretation: Relevant laboratory and radiographic results were reviewed and independently interpreted by myself.  As necessary, they are commented on in the ED Course.    Chronic conditions affecting the patient's care: As documented above in [facility]    The patient was discussed with the following consultants/services: Hospitalist/Admitting Provider who accepted the patient for admission    Care Considerations: As documented above in [facility]    ED Course:  ED Course as of 24 0617   Mon Aug 26, 2024   042 ECG 12 lead  Performed at  0419, HR of 101, irregular rhythm, NAD, QTc 459, no sign of STEMI, no Q wave or T wave abnormality noted.    Reviewed and interpreted by me at time performed   []   0529 Bilirubin, Total (!): 6.5  Markedly elevated compared to prior [JM]   0529 AST(!): 152  Improved from 3 months ago [JM]   0529 Platelets(!!): 40 [JM]   0529 INR(!): 2.0  Concern for liver dysfunction [JM]   0608 CT abdomen pelvis w IV contrast  IMPRESSION:  1.  Cirrhotic liver with recanalization of the paraumbilical vein.  Small amount of predominantly upper quadrant and right paracolic  gutter ascites, likely due to liver cirrhosis. However correlation  with pancreatic and liver enzymes is recommended to exclude  superimposed pancreatitis.  2. Moderate hiatal hernia similar to prior. Stomach is collapsed  limiting assessment. Circumferential thickening of the colon  compatible with colitis. This may be due to infection, inflammation,  or reactive in the setting of portal colopathy.       [JM]      ED Course User Index  [] Vira Armstrong MD         Diagnoses as of 24 0617   Hematemesis with nausea   Alcoholic cirrhosis of liver with ascites (Multi)     Procedures   Procedures      Vira Armstrong MD  Emergency Medicine     Vira Armstrong MD  24 06

## 2024-08-26 NOTE — CARE PLAN
The patient's goals for the shift include feel better    The clinical goals for the shift include remain henodynamically stable    Over the shift, the patient did  make progress toward the following goals. Barriers to progression include gi bleed. Recommendations to address these barriers include ADMINIATER BLOOD PRODUCTS AS ORDERED .  MONITOR LABS

## 2024-08-26 NOTE — H&P
Critical Care Medicine       Date:  8/26/2024  Patient:  Israel Parikh  YOB: 1955  MRN:  [id number]   Admit Date:  8/26/2024      Chief Complaint   Patient presents with    Weakness, Gen    Vomiting Blood         History of Present Illness:  Israel Parikh is a 68 y.o. year old male patient with past medical history that he is not aware of. He states that he has not taking any medications in the past 6 months. He presented to the ER for GI bleed. Patient states he has had nausea and vomiting for the past several days. He notes blood within the vomit, described as dark coffee grounds. Patient also notes multiple episodes of diarrhea, noted to have stool in his pants. [facility] states [facility] and upon returning home has been too weak to purchase groceries and therefore has not eaten anything in the past several days. Denies fevers. No abdominal pain. No prior history of gastric ulcers. States that he drinks about 1-2 L of whiskey per day, but when he does drink he drinks at least 1 pint. Last drink was 2 days ago     Patient originally admitted to step down but had multiple large bloody BM in the ER. Hgb was 10 when baseline is 14-15. Patient is hemodynamically stable but Hgb dropped from 10 -> 5 after blood BM. Will admit to ICU.     Interval ICU Events:  8/26: Patients vitals remain stable. Hgb: 5.5. 2u PRBCs, 1u PLT, and 1u of FFP that we will hang when it is ready. GI was consulted and will do a scope at bedside. We will cont to monitor hemodynamics and intiate MTP if indicated.     Objective     Past Medical History:   Diagnosis Date    HTN (hypertension)      Past Surgical History:   Procedure Laterality Date    ANKLE SURGERY      CT ANGIO NECK  03/07/2023    CT NECK ANGIO W AND WO IV CONTRAST [facility] CT    NECK SURGERY       No medications prior to admission.     Patient has no known allergies.  Social History     Tobacco Use    Smoking status: Former      "Types: Cigarettes     Passive exposure: Never    Smokeless tobacco: Never   Substance Use Topics    Alcohol use: Yes     Comment: rarely    Drug use: Never     No family history on file.    Hospital Medications:    lactated Ringer's, 200 mL/hr, Last Rate: 200 mL/hr (08/26/24 0921)          Current Facility-Administered Medications:     calcium gluconate 2 g in sodium chloride (iso)  mL, 2 g, intravenous, Once, Maddi Latif PA-C    dextrose 50 % injection 12.5 g, 12.5 g, intravenous, q15 min PRN, Maddi Latif PA-C    dextrose 50 % injection 25 g, 25 g, intravenous, q15 min PRN, Maddi Latif PA-C    pantoprazole (ProtoNix) EC tablet 40 mg, 40 mg, oral, BID **OR** esomeprazole (NexIUM) suspension 40 mg, 40 mg, nasoduodenal tube, BID **OR** pantoprazole (ProtoNix) injection 40 mg, 40 mg, intravenous, BID, Maddi Latif PA-C    folic acid (Folvite) tablet 1 mg, 1 mg, oral, Daily, Maddi Latif PA-C    glucagon (Glucagen) injection 1 mg, 1 mg, intramuscular, q15 min PRN, Maddi Latif PA-C    glucagon (Glucagen) injection 1 mg, 1 mg, intramuscular, q15 min PRN, Maddi Latif PA-C    insulin lispro (HumaLOG) injection 0-5 Units, 0-5 Units, subcutaneous, q4h, Maddi Latif PA-C    lactated Ringer's infusion, 200 mL/hr, intravenous, Continuous, Maddi Latif PA-C, Last Rate: 200 mL/hr at 08/26/24 0921, 200 mL/hr at 08/26/24 0921    multivitamin with minerals 1 tablet, 1 tablet, oral, Daily, Maddi Latif PA-C    octreotide (SandoSTATIN) injection 50 mcg, 50 mcg, intravenous, Daily, Maddi Latif PA-C    [START ON 8/29/2024] thiamine (Vitamin B-1) tablet 100 mg, 100 mg, oral, Daily, Maddi Latif PA-C    thiamine (Vitamin B1) injection 100 mg, 100 mg, intravenous, Daily, Maddi Latif PA-C    Physical Exam:    Heart Rate:  []   Temp:  [36.9 °C (98.4 °F)]   Resp:  [16-27]   BP: ()/(52-79)   Height:  [175.3 cm (5' 9\")]   Weight:  [83.9 kg (185 lb)]   SpO2:  [96 %-100 %] "     Physical Exam  HENT:      Head: Normocephalic and atraumatic.      Mouth/Throat:      Mouth: Mucous membranes are dry.   Eyes:      Pupils: Pupils are equal, round, and reactive to light.   Cardiovascular:      Rate and Rhythm: Regular rhythm. Tachycardia present.      Pulses: Normal pulses.      Heart sounds: Normal heart sounds.   Pulmonary:      Effort: Pulmonary effort is normal.      Breath sounds: Normal breath sounds.   Abdominal:      General: There is no distension.      Palpations: Abdomen is soft.      Tenderness: There is abdominal tenderness.   Musculoskeletal:         General: No swelling.   Skin:     General: Skin is warm.      Capillary Refill: Capillary refill takes less than 2 seconds.      Coloration: Skin is pale.   Neurological:      General: No focal deficit present.      Mental Status: He is alert.   Psychiatric:         Mood and Affect: Mood normal.         Review of Systems:  14 point review of systems was completed and negative except for those specially mention in my HPI    I have reviewed all medications, laboratory results, and imaging pertinent for today's encounter.         No intake or output data in the 24 hours ending 08/26/24 0941         Assessment/Plan:    I am currently managing this critically ill patient for the following problems:    Neuro/Psych/Pain Ctrl/Sedation:  Alcohol Abuse  - Pain Management: tylenol  - CAM ICU  - CIWA without meds  - Folic acid, thiamine    Respiratory/ENT:  - Maintain SPO2 >92%  - Continuous pulse ox monitoring   - Pulm hygiene    Cardiovascular:  - Continuous cardiac monitoring per ICU protocol  - Maintain MAPS >65  - Daily EKGs    GI:  GI Bleed 2/2 possible esophageal varices  - NPO  - BR with none  - PPI BID  - CTA ordered  - Octreotide started  - GI consulted  -- Scope today    Renal/Volume Status (Intra & Extravascular):  - Maintain urine output 0.5-1.0cc/kg/hr  - Monitor I/O's  - Replete electrolytes to maintain K >4.0 and Mg >2.0  - Daily  BMP, Mg  - Cr: 0.5    Endocrine  No hx of DMII or thyroid concerns  - SSI q4hrs while NPO  - Monitor for hyper/hypoglycemia     Infectious Disease:  No fever or leukocytosis  - Monitor SIRS criteria  - WBC: 6    Heme/Onc:  - Monitor for s/sx of anemia such as bleeding and bruising   - Transfuse if Hgb <7.0   - CBC q4h   - 2u PRBC, 1uFFP 1u plasma ordered  - Hgb: 10.2 -> 5.5  - PLT: 40    MSK:  - Padded pressure points   - Ambulatory at baseline    Skin  - ICU skin protocol    Ethics/Code Status:  Full Code    :  DVT Prophylaxis: Holding GI bleed  GI Prophylaxis: PPI BID  Bowel Regimen: None  Diet: NPO  CVC: None  [place]: None  [place]: None  Restraints: None  Dispo: Admit to ICU    Critical Care Time:  74 minutes spent in preparing to see patient (I.e.labs,imaging, etc.), documentation, discussion plan of care with patient/family/caregiver, and/ or coordination of care with multidisciplinary team including the attending. Time does not include completion of procedure time.     Maddi Latif PA-C  Pulmonology & Critical Care Medicine   [facility]

## 2024-08-26 NOTE — CONSULTS
Vascular Access Team  Consult     Visit Date: 8/26/2024      Patient Name: Israel Parikh         MRN: [id number]                Reason for Consult: additional piv access            Assessment: pivs x 2 placed when patient in ED, patient requiring more PIV access for blood products, iv medication and sedation for bedside procedure. Decisioin made to place midline for stable, secure additional IV access.             Plan: Place single lumen midline today       Nakia Martinez RN  8/26/2024  11:09 AM

## 2024-08-26 NOTE — SIGNIFICANT EVENT
Continued emesis and large amount of melena. Patient toxic appearing. With the patient's cirrhosis and continued bleeding and clinical appearance, I discussed with ICU Dr. العلي who will accept patient to their service.

## 2024-08-26 NOTE — ED TRIAGE NOTES
Vomiting with possible blood x3 days  Weakness and diarrhea for couple days  From home, pt called  Hx HTN, depression    Zofran ODT per medic, vomited after  No blood thinner  Denies CP, SOB

## 2024-08-26 NOTE — PROCEDURES
Vascular Access Team Procedure Note     Visit Date: 8/26/2024      Patient Name: Israel Parikh         MRN: [id number]             Procedure:  Single lumen midline placed in Rt brachial vein without difficulty, catheter length 16cm, arm circumference 26cm, ext catheter at 1 cm, flushes easily and with positive blood return, clamped and curos cap applied. Patient tolerated well. RN aware of placement and ok to use.                          Nakia Martinez RN  8/26/2024  11:08 AM

## 2024-08-26 NOTE — CONSULTS
"Consults    Reason For Consult  GI Bleed    History Of Present Illness  Israel Parikh is a 68 y.o. male presenting with hematemesis and melena. Found hgb 5.5. Had large episode of red bloody stool per ED. He is actively dry heaving, restless at time of exam. He mentions \"a liver doctor told me if I didn't stop drink that I would die\" but he isn't sure he was diagnosed with cirrhosis. He is still drinking alcohol. CT a/p showing nodular liver with signs of portal HTN. He is having some epigastric pain as well. There was diffuse colonic wall on imaging as well. He denies prior colonoscopy or EGD. Not on anticoagulation. Denies NSAIDs      Past Medical History  He has a past medical history of HTN (hypertension).    Surgical History  He has a past surgical history that includes CT angio neck (03/07/2023); Ankle surgery; and Neck surgery.     Social History  He reports that he has quit smoking. His smoking use included cigarettes. He has never been exposed to tobacco smoke. He has never used smokeless tobacco. He reports current alcohol use. He reports that he does not use drugs.    Family History  No family history on file.     Allergies  Patient has no known allergies.    Review of Systems   Constitutional:  Positive for fatigue. Negative for chills and fever.   Gastrointestinal:  Positive for abdominal distention, abdominal pain, blood in stool, diarrhea, nausea and vomiting. Negative for constipation and rectal pain.   Neurological:  Positive for weakness.        Physical Exam  Vitals reviewed.   Constitutional:       General: He is awake.      Appearance: Normal appearance. He is ill-appearing.   HENT:      Head: Normocephalic and atraumatic.      Mouth/Throat:      Mouth: Mucous membranes are moist.   Cardiovascular:      Rate and Rhythm: Normal rate and regular rhythm.   Pulmonary:      Effort: Pulmonary effort is normal.      Breath sounds: Normal breath sounds.   Abdominal:      General: There is no " "distension.      Palpations: Abdomen is soft.      Tenderness: There is abdominal tenderness. There is no guarding.   Musculoskeletal:      Cervical back: Normal range of motion and neck supple.   Skin:     General: Skin is warm and dry.   Neurological:      General: No focal deficit present.      Mental Status: He is alert and oriented to person, place, and time. Mental status is at baseline.   Psychiatric:         Attention and Perception: Attention and perception normal.         Mood and Affect: Mood normal.         Behavior: Behavior normal.          Last Recorded Vitals  Blood pressure 109/64, pulse (!) 130, temperature 36.9 °C (98.4 °F), temperature source Oral, resp. rate 19, height 1.753 m (5' 9\"), weight 83.9 kg (185 lb), SpO2 97%.    Relevant Results  Results for orders placed or performed during the hospital encounter of 08/26/24 (from the past 24 hour(s))   CBC and Auto Differential   Result Value Ref Range    WBC 6.0 4.4 - 11.3 x10*3/uL    nRBC 0.0 0.0 - 0.0 /100 WBCs    RBC 3.11 (L) 4.50 - 5.90 x10*6/uL    Hemoglobin 10.2 (L) 13.5 - 17.5 g/dL    Hematocrit 28.6 (L) 41.0 - 52.0 %    MCV 92 80 - 100 fL    MCH 32.8 26.0 - 34.0 pg    MCHC 35.7 32.0 - 36.0 g/dL    RDW 13.6 11.5 - 14.5 %    Platelets 40 (LL) 150 - 450 x10*3/uL    Immature Granulocytes %, Automated 0.3 0.0 - 0.9 %    Immature Granulocytes Absolute, Automated 0.02 0.00 - 0.70 x10*3/uL   Comprehensive metabolic panel   Result Value Ref Range    Glucose 142 (H) 65 - 99 mg/dL    Sodium 133 133 - 145 mmol/L    Potassium 3.6 3.4 - 5.1 mmol/L    Chloride 92 (L) 97 - 107 mmol/L    Bicarbonate 21 (L) 24 - 31 mmol/L    Urea Nitrogen 24 8 - 25 mg/dL    Creatinine 0.50 0.40 - 1.60 mg/dL    eGFR >90 >60 mL/min/1.73m*2    Calcium 9.6 8.5 - 10.4 mg/dL    Albumin 3.4 (L) 3.5 - 5.0 g/dL    Alkaline Phosphatase 66 35 - 125 U/L    Total Protein 5.3 (L) 5.9 - 7.9 g/dL     (H) 5 - 40 U/L    Bilirubin, Total 6.5 (H) 0.1 - 1.2 mg/dL    ALT 79 (H) 5 - 40 U/L "    Anion Gap >19 (H) <=19 mmol/L   Pathologist Review-CBC Differential   Result Value Ref Range    Pathologist Review-CBC Differential       Review of peripheral blood smear reveals thrombocytopenia.  Normocytic anemia with few subha cells.  Please correlate clinically.   Manual Differential   Result Value Ref Range    Neutrophils %, Manual 70.0 40.0 - 80.0 %    Bands %, Manual 1.0 0.0 - 5.0 %    Lymphocytes %, Manual 18.0 13.0 - 44.0 %    Monocytes %, Manual 10.0 2.0 - 10.0 %    Eosinophils %, Manual 0.0 0.0 - 6.0 %    Basophils %, Manual 0.0 0.0 - 2.0 %    Metamyelocytes %, Manual 1.0 0.0 - 0.0 %    Seg Neutrophils Absolute, Manual 4.20 1.20 - 7.00 x10*3/uL    Bands Absolute, Manual 0.06 0.00 - 0.70 x10*3/uL    Lymphocytes Absolute, Manual 1.08 (L) 1.20 - 4.80 x10*3/uL    Monocytes Absolute, Manual 0.60 0.10 - 1.00 x10*3/uL    Eosinophils Absolute, Manual 0.00 0.00 - 0.70 x10*3/uL    Basophils Absolute, Manual 0.00 0.00 - 0.10 x10*3/uL    Metamyelocytes Absolute, Manual 0.06 0.00 - 0.00 x10*3/uL    Total Cells Counted 100     Neutrophils Absolute, Manual 4.26 1.20 - 7.70 x10*3/uL    RBC Morphology See Below     Subha Cells Few    Lipase   Result Value Ref Range    Lipase 33 16 - 63 U/L   Type and Screen   Result Value Ref Range    ABO TYPE AB     Rh TYPE POS     ANTIBODY SCREEN NEG    Protime-INR   Result Value Ref Range    Protime 19.8 (H) 9.3 - 12.7 seconds    INR 2.0 (H) 0.9 - 1.2   ECG 12 lead   Result Value Ref Range    Ventricular Rate 101 BPM    Atrial Rate 101 BPM    MS Interval 148 ms    QRS Duration 80 ms    QT Interval 354 ms    QTC Calculation(Bazett) 459 ms    P Axis 55 degrees    R Axis 36 degrees    T Axis 57 degrees    QRS Count 16 beats    Q Onset 224 ms    P Onset 150 ms    P Offset 210 ms    T Offset 401 ms    QTC Fredericia 421 ms   Hemoglobin and hematocrit, blood   Result Value Ref Range    Hemoglobin 5.5 (LL) 13.5 - 17.5 g/dL    Hematocrit 16.3 (L) 41.0 - 52.0 %   Prepare Plasma: 1 Units    Result Value Ref Range    PRODUCT CODE R3234J10     Unit Number [id number]     Unit ABO AB     Unit RH NEG     Dispense Status XM     Blood Expiration Date 8/28/2024 10:49:00 AM EDT     PRODUCT BLOOD TYPE 2800     UNIT VOLUME 200      ECG 12 lead    Result Date: 8/26/2024  Sinus tachycardia with Premature atrial complexes with Aberrant conduction Otherwise normal ECG When compared with ECG of 22-MAY-2024 18:18, Aberrant conduction is now Present Nonspecific T wave abnormality has replaced inverted T waves in Inferior leads Nonspecific T wave abnormality no longer evident in Lateral leads    CT abdomen pelvis w IV contrast    Result Date: 8/26/2024  Interpreted By:  Deangelo Hobbs, STUDY: CT ABDOMEN PELVIS W IV CONTRAST;  8/26/2024 5:27 am   INDICATION: Signs/Symptoms:abdominal pain, nausea, vomiting, diarrhea, hx of hernia repair x3.   COMPARISON: CT abdomen pelvis dated 03/07/2023.   ACCESSION NUMBER(S): [id number]   ORDERING CLINICIAN: DARREN JACOBSON   TECHNIQUE: CT of the abdomen and pelvis was performed.  Standard contiguous axial images were obtained through the abdomen and pelvis. Coronal and sagittal reconstructions were performed.   75 ml of contrast Omnipaque 350 were administered intravenously without immediate complication.   FINDINGS: LOWER CHEST: No acute abnormality of the lung bases. Mild bibasilar linear atelectasis. BONES: No acute osseous abnormality. ABDOMINAL WALL: Within normal limits. LYMPH NODES: No pathologically enlarged lymph nodes in the abdomen or pelvis.   ABDOMEN:   LIVER: Liver is diffusely hypodense consistent with hepatic steatosis. There is interval development of nodular liver contour consistent with cirrhosis. There is recanalization of the paraumbilical vein. BILE DUCTS: Normal caliber. GALLBLADDER: There is a gallbladder calculus without gallbladder wall thickening or distention. PANCREAS: No evidence of pancreatic duct dilatation or peripancreatic edema. SPLEEN:  Pancreas is not enlarged and there is no pancreatic ductal dilatation. There is some mesenteric edema in the anterior pararenal spaces likely not related to the pancreas. ADRENALS: Within normal limits. KIDNEYS and URETERS: No evidence of hydronephrosis or nephrolithiasis. Normal size and enhancement pattern.     VESSELS: No aortic aneurysm. Mesenteric vessels are patent. Mild atherosclerotic calcifications of the abdominal aorta. RETROPERITONEUM: No evidence of retroperitoneal hematoma.   PELVIS:   REPRODUCTIVE ORGANS: No pelvic masses. BLADDER: No gross abnormality.   BOWEL: Moderate hiatal hernia similar to prior. Stomach is otherwise collapsed limiting assessment. No bowel dilatation. Normal appendix. There is diffuse wall thickening of the ascending and to lesser extent more distal colon compatible with pancolitis. Sigmoid diverticulosis without diverticulitis. PERITONEUM: No free air, no fluid collection. There is small amount of predominantly perihepatic ascites and small amount of fluid in the bilateral anterior pararenal spaces and diffuse mesenteric edema.       1.  Cirrhotic liver with recanalization of the paraumbilical vein. Small amount of predominantly upper quadrant and right paracolic gutter ascites, likely due to liver cirrhosis. However correlation with pancreatic and liver enzymes is recommended to exclude superimposed pancreatitis. 2. Moderate hiatal hernia similar to prior. Stomach is collapsed limiting assessment. Circumferential thickening of the colon compatible with colitis. This may be due to infection, inflammation, or reactive in the setting of portal colopathy.     Signed by: Deangelo Hobbs 8/26/2024 6:01 AM Dictation workstation:   [id number]        Assessment/Plan     GI Bleed, Anemia   -Actively dry heaving and restless. Having large amounts of bloody stools. Given his nodular liver on imaging with low plts and hx ETOH abuse, concern for EV bleeding. Dr Davidson aware with plans for  EGD in ICU at bedside    -IV Octreotide and PPI    -Needs PRBC transfusion now (RN has called blood bank)     Elevated LFTs, Thrombocytopenia, Abnormal CT    -Concern for new cirrhosis. Will complete further liver work up once stable from GI bleeding     I spent 30 minutes in the professional and overall care of this patient.

## 2024-08-26 NOTE — SIGNIFICANT EVENT
Patient was placed in an SBT by intensivist. RT extubated patient to 4L NC @11:43. RN and intensivist were at bedside.

## 2024-08-26 NOTE — PROGRESS NOTES
Pharmacy Medication History Review    Israel Parikh is a 68 y.o. male admitted for Hematemesis with nausea. Pharmacy reviewed the patient's [id number] medications and allergies for accuracy.    Medications ADDED:  none  Medications CHANGED:  none  Medications REMOVED:   none     The list below reflects the updated PTA list. Comments regarding how patient may be taking medications differently can be found in the Admit Orders Activity  None        The list below reflects the updated allergy list. Please review each documented allergy for additional clarification and justification.  Allergies  Reviewed by Kina Rosen on 8/26/2024   No Known Allergies         [facility]    Sources used to complete the med history include dispense history, historical progress notes and [facility], patient interview.     Below are additional concerns with the patient's PTA list.  [facility] - [facility]. Patient reports no medications for months.  Tylenol 325mg - 2 each TID PRN  Tums 750mg - PRN  Desvenlafaxine 50mg - 1 every day  Voltaren gel - PRN  Ensure Plus  Finasteride 5mg - 1 every day  Folic acid 1mg-  1 every day  Furosemide 20mg - 1 every day  Gabapentin 300mg - 1 TID  Hydroxyzine pamoate 50mg - 1 4times daily PRN  Lidocaine patch 5% - PRN  Magnesium oxide 4250mg - 1 every day  Melatonin 3mg - 2 HS  Multivitamin - 1 every day  Prazosine 2mg - 1 HS  Sprionolactone 25 - 1 every day  Trazodone 100mg - 1 HS    Kina Rosen, CPhT-ADV  Please reach out via [employer] Secure Chat for questions

## 2024-08-26 NOTE — NURSING NOTE
Egd at bedside   Dr العلي present and Dr Rolon at bedside   pt intubated for procedure by Dr العلي  #8 at 23 cm lip line maday breath sounds   eg copleted at 1132

## 2024-08-26 NOTE — PROCEDURES
INTUBATION AND SEDATION    Pre-anesthetic evaluation  69 yo M with PMH of alcoholic cirrhosis admitted for acute GI bleed requiring emergent EGD.    Exam:  MP 1  TM distance > 3 FB  Full neck ROM  +Beard  Dentition intact    No personal h/o anesthetic complications.  No family h/o anesthetic complications (adopted).    Plan: RSI and GA.  Discussed risks & benefits of this plan.  Consent obtained.      Intubation  Standard ASA monitors except temperature due to the anticipated brevity of the procedure.  Pre-oxygenated with FiO2 100%.  RSI performed.  Anesthesia induced with etomidate 16 mg and succinylcholine 100 mg.  DL with mac 3, grade 1 view.  Atraumatic intubation with #8.0 ETT at 23 cm @ lips.  +EtCO2.  BSEB.  No complications.  Patient tolerated well.      Sedation  Administered propofol 50 mg bolus and started a propofol drip @ 50 mcg/kg/min.  Administered an additional propofol 50 mg x 2 during the procedure.  Propofol drip discontinued at the conclusion of the procedure.  Suctioned oropharynx and extubated without complication once patient met extubation criteria.    Total time providing sedation: 30 min.      Peña العلي MD

## 2024-08-26 NOTE — CARE PLAN
The patient's goals for the shift include feel better    The clinical goals for the shift include remain hemodoynamically stable      Problem: Fall/Injury  Goal: Not fall by end of shift  Outcome: Progressing  Goal: Be free from injury by end of the shift  Outcome: Progressing  Goal: Verbalize understanding of personal risk factors for fall in the hospital  Outcome: Progressing  Goal: Verbalize understanding of risk factor reduction measures to prevent injury from fall in the home  Outcome: Progressing  Goal: Use assistive devices by end of the shift  Outcome: Progressing  Goal: Pace activities to prevent fatigue by end of the shift  Outcome: Progressing     Problem: Pain  Goal: Takes deep breaths with improved pain control throughout the shift  Outcome: Progressing  Goal: Turns in bed with improved pain control throughout the shift  Outcome: Progressing  Goal: Walks with improved pain control throughout the shift  Outcome: Progressing  Goal: Performs ADL's with improved pain control throughout shift  Outcome: Progressing  Goal: Participates in PT with improved pain control throughout the shift  Outcome: Progressing  Goal: Free from opioid side effects throughout the shift  Outcome: Progressing  Goal: Free from acute confusion related to pain meds throughout the shift  Outcome: Progressing     Problem: Skin  Goal: Participates in plan/prevention/treatment measures  Outcome: Progressing  Goal: Prevent/manage excess moisture  Outcome: Progressing  Goal: Prevent/minimize sheer/friction injuries  Outcome: Progressing  Goal: Promote/optimize nutrition  Outcome: Progressing  Goal: Promote skin healing  Outcome: Progressing

## 2024-08-26 NOTE — PROGRESS NOTES
Spiritual Care Visit    Clinical Encounter Type  Visited With: Patient not available  Routine Visit: Introduction  Continue Visiting: Yes         Values/Beliefs  Cultural Requests During Hospitalization: none  Spiritual Requests During Hospitalization: priest Gary Denis

## 2024-08-27 PROBLEM — D69.6 THROMBOCYTOPENIA (CMS-HCC): Status: ACTIVE | Noted: 2024-08-27

## 2024-08-27 PROBLEM — D62 ACUTE BLOOD LOSS ANEMIA: Status: ACTIVE | Noted: 2024-08-27

## 2024-08-27 PROBLEM — F10.939 ALCOHOL USE WITH WITHDRAWAL (MULTI): Status: ACTIVE | Noted: 2024-08-27

## 2024-08-27 PROBLEM — R19.7 DIARRHEA OF PRESUMED INFECTIOUS ORIGIN: Status: ACTIVE | Noted: 2024-08-27

## 2024-08-27 PROBLEM — K70.31 ALCOHOLIC CIRRHOSIS OF LIVER WITH ASCITES (MULTI): Status: ACTIVE | Noted: 2024-08-27

## 2024-08-27 LAB
ALBUMIN SERPL-MCNC: 2.9 G/DL (ref 3.5–5)
ALP BLD-CCNC: 46 U/L (ref 35–125)
ALT SERPL-CCNC: 57 U/L (ref 5–40)
ANION GAP SERPL CALC-SCNC: 10 MMOL/L
ANION GAP SERPL CALC-SCNC: 10 MMOL/L
AST SERPL-CCNC: 123 U/L (ref 5–40)
ATRIAL RATE: 101 BPM
BASOPHILS # BLD AUTO: 0.01 X10*3/UL (ref 0–0.1)
BASOPHILS # BLD AUTO: 0.02 X10*3/UL (ref 0–0.1)
BASOPHILS NFR BLD AUTO: 0.2 %
BASOPHILS NFR BLD AUTO: 0.2 %
BASOPHILS NFR BLD AUTO: 0.3 %
BASOPHILS NFR BLD AUTO: 0.5 %
BILIRUB SERPL-MCNC: 7.1 MG/DL (ref 0.1–1.2)
BUN SERPL-MCNC: 11 MG/DL (ref 8–25)
BUN SERPL-MCNC: 9 MG/DL (ref 8–25)
BURR CELLS BLD QL SMEAR: NORMAL
BURR CELLS BLD QL SMEAR: NORMAL
CA-I BLD-SCNC: 1.08 MMOL/L (ref 1.1–1.33)
CALCIUM SERPL-MCNC: 7.6 MG/DL (ref 8.5–10.4)
CALCIUM SERPL-MCNC: 7.8 MG/DL (ref 8.5–10.4)
CHLORIDE SERPL-SCNC: 101 MMOL/L (ref 97–107)
CHLORIDE SERPL-SCNC: 101 MMOL/L (ref 97–107)
CO2 SERPL-SCNC: 23 MMOL/L (ref 24–31)
CO2 SERPL-SCNC: 24 MMOL/L (ref 24–31)
CREAT SERPL-MCNC: 0.5 MG/DL (ref 0.4–1.6)
CREAT SERPL-MCNC: 0.5 MG/DL (ref 0.4–1.6)
EGFRCR SERPLBLD CKD-EPI 2021: >90 ML/MIN/1.73M*2
EGFRCR SERPLBLD CKD-EPI 2021: >90 ML/MIN/1.73M*2
EOSINOPHIL # BLD AUTO: 0.09 X10*3/UL (ref 0–0.7)
EOSINOPHIL # BLD AUTO: 0.13 X10*3/UL (ref 0–0.7)
EOSINOPHIL # BLD AUTO: 0.14 X10*3/UL (ref 0–0.7)
EOSINOPHIL # BLD AUTO: 0.17 X10*3/UL (ref 0–0.7)
EOSINOPHIL NFR BLD AUTO: 2.2 %
EOSINOPHIL NFR BLD AUTO: 3.2 %
EOSINOPHIL NFR BLD AUTO: 3.3 %
EOSINOPHIL NFR BLD AUTO: 4 %
ERYTHROCYTE [DISTWIDTH] IN BLOOD BY AUTOMATED COUNT: 16.4 % (ref 11.5–14.5)
ERYTHROCYTE [DISTWIDTH] IN BLOOD BY AUTOMATED COUNT: 16.5 % (ref 11.5–14.5)
ERYTHROCYTE [DISTWIDTH] IN BLOOD BY AUTOMATED COUNT: 16.6 % (ref 11.5–14.5)
ERYTHROCYTE [DISTWIDTH] IN BLOOD BY AUTOMATED COUNT: 16.8 % (ref 11.5–14.5)
GLUCOSE BLD MANUAL STRIP-MCNC: 100 MG/DL (ref 74–99)
GLUCOSE BLD MANUAL STRIP-MCNC: 103 MG/DL (ref 74–99)
GLUCOSE BLD MANUAL STRIP-MCNC: 108 MG/DL (ref 74–99)
GLUCOSE BLD MANUAL STRIP-MCNC: 86 MG/DL (ref 74–99)
GLUCOSE BLD MANUAL STRIP-MCNC: 92 MG/DL (ref 74–99)
GLUCOSE BLD MANUAL STRIP-MCNC: 93 MG/DL (ref 74–99)
GLUCOSE SERPL-MCNC: 102 MG/DL (ref 65–99)
GLUCOSE SERPL-MCNC: 94 MG/DL (ref 65–99)
HCT VFR BLD AUTO: 22.2 % (ref 41–52)
HCT VFR BLD AUTO: 22.5 % (ref 41–52)
HCT VFR BLD AUTO: 22.6 % (ref 41–52)
HCT VFR BLD AUTO: 23.2 % (ref 41–52)
HGB BLD-MCNC: 7.8 G/DL (ref 13.5–17.5)
HGB BLD-MCNC: 7.9 G/DL (ref 13.5–17.5)
HGB BLD-MCNC: 8 G/DL (ref 13.5–17.5)
HGB BLD-MCNC: 8 G/DL (ref 13.5–17.5)
IMM GRANULOCYTES # BLD AUTO: 0.01 X10*3/UL (ref 0–0.7)
IMM GRANULOCYTES # BLD AUTO: 0.02 X10*3/UL (ref 0–0.7)
IMM GRANULOCYTES NFR BLD AUTO: 0.2 % (ref 0–0.9)
IMM GRANULOCYTES NFR BLD AUTO: 0.2 % (ref 0–0.9)
IMM GRANULOCYTES NFR BLD AUTO: 0.3 % (ref 0–0.9)
IMM GRANULOCYTES NFR BLD AUTO: 0.5 % (ref 0–0.9)
INR PPP: 1.7 (ref 0.9–1.2)
LYMPHOCYTES # BLD AUTO: 1.14 X10*3/UL (ref 1.2–4.8)
LYMPHOCYTES # BLD AUTO: 1.29 X10*3/UL (ref 1.2–4.8)
LYMPHOCYTES # BLD AUTO: 1.29 X10*3/UL (ref 1.2–4.8)
LYMPHOCYTES # BLD AUTO: 1.3 X10*3/UL (ref 1.2–4.8)
LYMPHOCYTES NFR BLD AUTO: 28.4 %
LYMPHOCYTES NFR BLD AUTO: 29.3 %
LYMPHOCYTES NFR BLD AUTO: 30.4 %
LYMPHOCYTES NFR BLD AUTO: 32.3 %
MAGNESIUM SERPL-MCNC: 1.6 MG/DL (ref 1.6–3.1)
MAGNESIUM SERPL-MCNC: 2.1 MG/DL (ref 1.6–3.1)
MCH RBC QN AUTO: 31.3 PG (ref 26–34)
MCH RBC QN AUTO: 31.5 PG (ref 26–34)
MCH RBC QN AUTO: 32.1 PG (ref 26–34)
MCH RBC QN AUTO: 32.4 PG (ref 26–34)
MCHC RBC AUTO-ENTMCNC: 34.1 G/DL (ref 32–36)
MCHC RBC AUTO-ENTMCNC: 35.1 G/DL (ref 32–36)
MCHC RBC AUTO-ENTMCNC: 35.4 G/DL (ref 32–36)
MCHC RBC AUTO-ENTMCNC: 35.6 G/DL (ref 32–36)
MCV RBC AUTO: 89 FL (ref 80–100)
MCV RBC AUTO: 91 FL (ref 80–100)
MCV RBC AUTO: 91 FL (ref 80–100)
MCV RBC AUTO: 92 FL (ref 80–100)
MONOCYTES # BLD AUTO: 0.54 X10*3/UL (ref 0.1–1)
MONOCYTES # BLD AUTO: 0.55 X10*3/UL (ref 0.1–1)
MONOCYTES # BLD AUTO: 0.64 X10*3/UL (ref 0.1–1)
MONOCYTES # BLD AUTO: 0.69 X10*3/UL (ref 0.1–1)
MONOCYTES NFR BLD AUTO: 13.5 %
MONOCYTES NFR BLD AUTO: 13.7 %
MONOCYTES NFR BLD AUTO: 14.5 %
MONOCYTES NFR BLD AUTO: 16.1 %
NEUTROPHILS # BLD AUTO: 2.01 X10*3/UL (ref 1.2–7.7)
NEUTROPHILS # BLD AUTO: 2.1 X10*3/UL (ref 1.2–7.7)
NEUTROPHILS # BLD AUTO: 2.21 X10*3/UL (ref 1.2–7.7)
NEUTROPHILS # BLD AUTO: 2.3 X10*3/UL (ref 1.2–7.7)
NEUTROPHILS NFR BLD AUTO: 49.1 %
NEUTROPHILS NFR BLD AUTO: 50.3 %
NEUTROPHILS NFR BLD AUTO: 52 %
NEUTROPHILS NFR BLD AUTO: 55.3 %
NRBC BLD-RTO: 0 /100 WBCS (ref 0–0)
OVALOCYTES BLD QL SMEAR: NORMAL
OVALOCYTES BLD QL SMEAR: NORMAL
P AXIS: 55 DEGREES
P OFFSET: 210 MS
P ONSET: 150 MS
PHOSPHATE SERPL-MCNC: 1.9 MG/DL (ref 2.5–4.5)
PHOSPHATE SERPL-MCNC: 2.5 MG/DL (ref 2.5–4.5)
PLATELET # BLD AUTO: 29 X10*3/UL (ref 150–450)
PLATELET # BLD AUTO: 30 X10*3/UL (ref 150–450)
PLATELET # BLD AUTO: 31 X10*3/UL (ref 150–450)
PLATELET # BLD AUTO: 32 X10*3/UL (ref 150–450)
POLYCHROMASIA BLD QL SMEAR: NORMAL
POLYCHROMASIA BLD QL SMEAR: NORMAL
POTASSIUM SERPL-SCNC: 3.1 MMOL/L (ref 3.4–5.1)
POTASSIUM SERPL-SCNC: 3.8 MMOL/L (ref 3.4–5.1)
PR INTERVAL: 148 MS
PROT SERPL-MCNC: 4.1 G/DL (ref 5.9–7.9)
PROTHROMBIN TIME: 17.2 SECONDS (ref 9.3–12.7)
Q ONSET: 224 MS
QRS COUNT: 16 BEATS
QRS DURATION: 80 MS
QT INTERVAL: 354 MS
QTC CALCULATION(BAZETT): 459 MS
QTC FREDERICIA: 421 MS
R AXIS: 36 DEGREES
RBC # BLD AUTO: 2.47 X10*6/UL (ref 4.5–5.9)
RBC # BLD AUTO: 2.49 X10*6/UL (ref 4.5–5.9)
RBC # BLD AUTO: 2.49 X10*6/UL (ref 4.5–5.9)
RBC # BLD AUTO: 2.51 X10*6/UL (ref 4.5–5.9)
RBC MORPH BLD: NORMAL
RBC MORPH BLD: NORMAL
SCHISTOCYTES BLD QL SMEAR: NORMAL
SODIUM SERPL-SCNC: 134 MMOL/L (ref 133–145)
SODIUM SERPL-SCNC: 135 MMOL/L (ref 133–145)
T AXIS: 57 DEGREES
T OFFSET: 401 MS
VENTRICULAR RATE: 101 BPM
WBC # BLD AUTO: 4 X10*3/UL (ref 4.4–11.3)
WBC # BLD AUTO: 4 X10*3/UL (ref 4.4–11.3)
WBC # BLD AUTO: 4.3 X10*3/UL (ref 4.4–11.3)
WBC # BLD AUTO: 4.4 X10*3/UL (ref 4.4–11.3)

## 2024-08-27 PROCEDURE — 2500000001 HC RX 250 WO HCPCS SELF ADMINISTERED DRUGS (ALT 637 FOR MEDICARE OP)

## 2024-08-27 PROCEDURE — 83735 ASSAY OF MAGNESIUM: CPT

## 2024-08-27 PROCEDURE — 99291 CRITICAL CARE FIRST HOUR: CPT

## 2024-08-27 PROCEDURE — 85610 PROTHROMBIN TIME: CPT

## 2024-08-27 PROCEDURE — 2500000005 HC RX 250 GENERAL PHARMACY W/O HCPCS

## 2024-08-27 PROCEDURE — 85025 COMPLETE CBC W/AUTO DIFF WBC: CPT

## 2024-08-27 PROCEDURE — 5A09357 ASSISTANCE WITH RESPIRATORY VENTILATION, LESS THAN 24 CONSECUTIVE HOURS, CONTINUOUS POSITIVE AIRWAY PRESSURE: ICD-10-PCS | Performed by: INTERNAL MEDICINE

## 2024-08-27 PROCEDURE — 2500000005 HC RX 250 GENERAL PHARMACY W/O HCPCS: Performed by: ANESTHESIOLOGY

## 2024-08-27 PROCEDURE — 94660 CPAP INITIATION&MGMT: CPT

## 2024-08-27 PROCEDURE — 2500000002 HC RX 250 W HCPCS SELF ADMINISTERED DRUGS (ALT 637 FOR MEDICARE OP, ALT 636 FOR OP/ED)

## 2024-08-27 PROCEDURE — 37799 UNLISTED PX VASCULAR SURGERY: CPT

## 2024-08-27 PROCEDURE — 84100 ASSAY OF PHOSPHORUS: CPT

## 2024-08-27 PROCEDURE — 2020000001 HC ICU ROOM DAILY

## 2024-08-27 PROCEDURE — 82947 ASSAY GLUCOSE BLOOD QUANT: CPT

## 2024-08-27 PROCEDURE — 2500000004 HC RX 250 GENERAL PHARMACY W/ HCPCS (ALT 636 FOR OP/ED): Performed by: NURSE PRACTITIONER

## 2024-08-27 PROCEDURE — 2500000004 HC RX 250 GENERAL PHARMACY W/ HCPCS (ALT 636 FOR OP/ED)

## 2024-08-27 PROCEDURE — 82330 ASSAY OF CALCIUM: CPT

## 2024-08-27 PROCEDURE — 84075 ASSAY ALKALINE PHOSPHATASE: CPT

## 2024-08-27 PROCEDURE — 2500000001 HC RX 250 WO HCPCS SELF ADMINISTERED DRUGS (ALT 637 FOR MEDICARE OP): Performed by: NURSE PRACTITIONER

## 2024-08-27 PROCEDURE — 2500000001 HC RX 250 WO HCPCS SELF ADMINISTERED DRUGS (ALT 637 FOR MEDICARE OP): Performed by: STUDENT IN AN ORGANIZED HEALTH CARE EDUCATION/TRAINING PROGRAM

## 2024-08-27 PROCEDURE — 80048 BASIC METABOLIC PNL TOTAL CA: CPT | Mod: CCI

## 2024-08-27 RX ORDER — POTASSIUM CHLORIDE 20 MEQ/1
20 TABLET, EXTENDED RELEASE ORAL ONCE
Status: COMPLETED | OUTPATIENT
Start: 2024-08-27 | End: 2024-08-27

## 2024-08-27 RX ORDER — PHENOBARBITAL 32.4 MG/1
32.4 TABLET ORAL 3 TIMES DAILY
Status: DISCONTINUED | OUTPATIENT
Start: 2024-08-29 | End: 2024-08-27

## 2024-08-27 RX ORDER — PHENOBARBITAL SODIUM 65 MG/ML
130 INJECTION, SOLUTION INTRAMUSCULAR; INTRAVENOUS
Status: DISCONTINUED | OUTPATIENT
Start: 2024-08-27 | End: 2024-08-30

## 2024-08-27 RX ORDER — LANOLIN ALCOHOL/MO/W.PET/CERES
100 CREAM (GRAM) TOPICAL DAILY
Status: DISCONTINUED | OUTPATIENT
Start: 2024-08-30 | End: 2024-08-27

## 2024-08-27 RX ORDER — METOCLOPRAMIDE HYDROCHLORIDE 5 MG/ML
5 INJECTION INTRAMUSCULAR; INTRAVENOUS EVERY 6 HOURS SCHEDULED
Status: DISCONTINUED | OUTPATIENT
Start: 2024-08-27 | End: 2024-09-02 | Stop reason: HOSPADM

## 2024-08-27 RX ORDER — FOLIC ACID 1 MG/1
1 TABLET ORAL DAILY
Status: DISCONTINUED | OUTPATIENT
Start: 2024-08-27 | End: 2024-08-27

## 2024-08-27 RX ORDER — PHENOBARBITAL SODIUM 65 MG/ML
260 INJECTION, SOLUTION INTRAMUSCULAR; INTRAVENOUS ONCE
Status: COMPLETED | OUTPATIENT
Start: 2024-08-27 | End: 2024-08-27

## 2024-08-27 RX ORDER — DICYCLOMINE HYDROCHLORIDE 10 MG/1
10 CAPSULE ORAL 4 TIMES DAILY
Status: DISCONTINUED | OUTPATIENT
Start: 2024-08-27 | End: 2024-09-02 | Stop reason: HOSPADM

## 2024-08-27 RX ORDER — PHENOBARBITAL 32.4 MG/1
64.8 TABLET ORAL 3 TIMES DAILY
Status: DISCONTINUED | OUTPATIENT
Start: 2024-08-27 | End: 2024-08-27

## 2024-08-27 RX ORDER — LORAZEPAM 1 MG/1
2 TABLET ORAL EVERY 2 HOUR PRN
Status: DISCONTINUED | OUTPATIENT
Start: 2024-08-27 | End: 2024-08-27

## 2024-08-27 RX ORDER — MAGNESIUM SULFATE HEPTAHYDRATE 40 MG/ML
2 INJECTION, SOLUTION INTRAVENOUS ONCE
Status: COMPLETED | OUTPATIENT
Start: 2024-08-27 | End: 2024-08-27

## 2024-08-27 RX ORDER — LORAZEPAM 1 MG/1
1 TABLET ORAL EVERY 2 HOUR PRN
Status: DISCONTINUED | OUTPATIENT
Start: 2024-08-27 | End: 2024-08-27

## 2024-08-27 RX ORDER — PHENOBARBITAL SODIUM 65 MG/ML
65 INJECTION, SOLUTION INTRAMUSCULAR; INTRAVENOUS EVERY 8 HOURS
Status: DISCONTINUED | OUTPATIENT
Start: 2024-08-29 | End: 2024-08-30

## 2024-08-27 RX ORDER — CALCIUM GLUCONATE 20 MG/ML
2 INJECTION, SOLUTION INTRAVENOUS ONCE
Status: COMPLETED | OUTPATIENT
Start: 2024-08-27 | End: 2024-08-27

## 2024-08-27 RX ORDER — LORAZEPAM 2 MG/ML
1 INJECTION INTRAMUSCULAR EVERY 2 HOUR PRN
Status: DISCONTINUED | OUTPATIENT
Start: 2024-08-27 | End: 2024-08-27

## 2024-08-27 RX ORDER — MULTIVIT-MIN/IRON FUM/FOLIC AC 7.5 MG-4
1 TABLET ORAL DAILY
Status: DISCONTINUED | OUTPATIENT
Start: 2024-08-27 | End: 2024-08-27

## 2024-08-27 RX ORDER — LORAZEPAM 0.5 MG/1
0.5 TABLET ORAL EVERY 2 HOUR PRN
Status: DISCONTINUED | OUTPATIENT
Start: 2024-08-27 | End: 2024-08-27

## 2024-08-27 RX ORDER — PHENOBARBITAL SODIUM 65 MG/ML
32.5 INJECTION, SOLUTION INTRAMUSCULAR; INTRAVENOUS EVERY 8 HOURS
Status: DISCONTINUED | OUTPATIENT
Start: 2024-08-31 | End: 2024-08-30

## 2024-08-27 RX ORDER — LORAZEPAM 2 MG/ML
2 INJECTION INTRAMUSCULAR EVERY 2 HOUR PRN
Status: DISCONTINUED | OUTPATIENT
Start: 2024-08-27 | End: 2024-08-27

## 2024-08-27 RX ORDER — POTASSIUM CHLORIDE 14.9 MG/ML
20 INJECTION INTRAVENOUS
Status: COMPLETED | OUTPATIENT
Start: 2024-08-27 | End: 2024-08-27

## 2024-08-27 RX ORDER — THIAMINE HYDROCHLORIDE 100 MG/ML
100 INJECTION, SOLUTION INTRAMUSCULAR; INTRAVENOUS DAILY
Status: DISCONTINUED | OUTPATIENT
Start: 2024-08-27 | End: 2024-08-27

## 2024-08-27 RX ORDER — LORAZEPAM 2 MG/ML
0.5 INJECTION INTRAMUSCULAR EVERY 2 HOUR PRN
Status: DISCONTINUED | OUTPATIENT
Start: 2024-08-27 | End: 2024-08-27

## 2024-08-27 RX ORDER — PHENOBARBITAL SODIUM 65 MG/ML
97.5 INJECTION, SOLUTION INTRAMUSCULAR; INTRAVENOUS EVERY 8 HOURS
Status: COMPLETED | OUTPATIENT
Start: 2024-08-27 | End: 2024-08-29

## 2024-08-27 SDOH — HEALTH STABILITY: MENTAL HEALTH: HOW OFTEN DO YOU HAVE 6 OR MORE DRINKS ON ONE OCCASION?: DAILY OR ALMOST DAILY

## 2024-08-27 SDOH — ECONOMIC STABILITY: INCOME INSECURITY: HOW HARD IS IT FOR YOU TO PAY FOR THE VERY BASICS LIKE FOOD, HOUSING, MEDICAL CARE, AND HEATING?: NOT VERY HARD

## 2024-08-27 SDOH — SOCIAL STABILITY: SOCIAL NETWORK: ARE YOU MARRIED, WIDOWED, DIVORCED, SEPARATED, NEVER MARRIED, OR LIVING WITH A PARTNER?: WIDOWED

## 2024-08-27 SDOH — ECONOMIC STABILITY: INCOME INSECURITY: IN THE PAST 12 MONTHS, HAS THE ELECTRIC, GAS, OIL, OR WATER COMPANY THREATENED TO SHUT OFF SERVICE IN YOUR HOME?: NO

## 2024-08-27 SDOH — ECONOMIC STABILITY: HOUSING INSECURITY: IN THE PAST 12 MONTHS, HOW MANY TIMES HAVE YOU MOVED WHERE YOU WERE LIVING?: 0

## 2024-08-27 SDOH — HEALTH STABILITY: PHYSICAL HEALTH: ON AVERAGE, HOW MANY MINUTES DO YOU ENGAGE IN EXERCISE AT THIS LEVEL?: 0 MIN

## 2024-08-27 SDOH — SOCIAL STABILITY: SOCIAL NETWORK: HOW OFTEN DO YOU ATTENT MEETINGS OF THE CLUB OR ORGANIZATION YOU BELONG TO?: NEVER

## 2024-08-27 SDOH — SOCIAL STABILITY: SOCIAL INSECURITY: WITHIN THE LAST YEAR, HAVE YOU BEEN AFRAID OF YOUR PARTNER OR EX-PARTNER?: NO

## 2024-08-27 SDOH — ECONOMIC STABILITY: HOUSING INSECURITY: AT ANY TIME IN THE PAST 12 MONTHS, WERE YOU HOMELESS OR LIVING IN A SHELTER (INCLUDING NOW)?: NO

## 2024-08-27 SDOH — ECONOMIC STABILITY: FOOD INSECURITY: WITHIN THE PAST 12 MONTHS, THE FOOD YOU BOUGHT JUST DIDN'T LAST AND YOU DIDN'T HAVE MONEY TO GET MORE.: NEVER TRUE

## 2024-08-27 SDOH — ECONOMIC STABILITY: FOOD INSECURITY: WITHIN THE PAST 12 MONTHS, YOU WORRIED THAT YOUR FOOD WOULD RUN OUT BEFORE YOU GOT MONEY TO BUY MORE.: NEVER TRUE

## 2024-08-27 SDOH — HEALTH STABILITY: MENTAL HEALTH
STRESS IS WHEN SOMEONE FEELS TENSE, NERVOUS, ANXIOUS, OR CAN'T SLEEP AT NIGHT BECAUSE THEIR MIND IS TROUBLED. HOW STRESSED ARE YOU?: TO SOME EXTENT

## 2024-08-27 SDOH — HEALTH STABILITY: MENTAL HEALTH: HOW OFTEN DO YOU HAVE A DRINK CONTAINING ALCOHOL?: 4 OR MORE TIMES A WEEK

## 2024-08-27 SDOH — SOCIAL STABILITY: SOCIAL INSECURITY: WITHIN THE LAST YEAR, HAVE YOU BEEN HUMILIATED OR EMOTIONALLY ABUSED IN OTHER WAYS BY YOUR PARTNER OR EX-PARTNER?: NO

## 2024-08-27 SDOH — SOCIAL STABILITY: SOCIAL NETWORK: IN A TYPICAL WEEK, HOW MANY TIMES DO YOU TALK ON THE PHONE WITH FAMILY, FRIENDS, OR NEIGHBORS?: ONCE A WEEK

## 2024-08-27 SDOH — SOCIAL STABILITY: SOCIAL NETWORK: HOW OFTEN DO YOU ATTEND CHURCH OR RELIGIOUS SERVICES?: NEVER

## 2024-08-27 SDOH — SOCIAL STABILITY: SOCIAL NETWORK: HOW OFTEN DO YOU GET TOGETHER WITH FRIENDS OR RELATIVES?: NEVER

## 2024-08-27 SDOH — HEALTH STABILITY: MENTAL HEALTH: HOW MANY STANDARD DRINKS CONTAINING ALCOHOL DO YOU HAVE ON A TYPICAL DAY?: 5 OR 6

## 2024-08-27 SDOH — ECONOMIC STABILITY: INCOME INSECURITY: IN THE LAST 12 MONTHS, WAS THERE A TIME WHEN YOU WERE NOT ABLE TO PAY THE MORTGAGE OR RENT ON TIME?: NO

## 2024-08-27 SDOH — HEALTH STABILITY: PHYSICAL HEALTH: ON AVERAGE, HOW MANY DAYS PER WEEK DO YOU ENGAGE IN MODERATE TO STRENUOUS EXERCISE (LIKE A BRISK WALK)?: 0 DAYS

## 2024-08-27 ASSESSMENT — LIFESTYLE VARIABLES
AGITATION: NORMAL ACTIVITY
ORIENTATION AND CLOUDING OF SENSORIUM: ORIENTED AND CAN DO SERIAL ADDITIONS
HEADACHE, FULLNESS IN HEAD: NOT PRESENT
TREMOR: NOT VISIBLE, BUT CAN BE FELT FINGERTIP TO FINGERTIP
NAUSEA AND VOMITING: NO NAUSEA AND NO VOMITING
NAUSEA AND VOMITING: NO NAUSEA AND NO VOMITING
ANXIETY: MILDLY ANXIOUS
HEADACHE, FULLNESS IN HEAD: NOT PRESENT
TREMOR: MODERATE, WITH PATIENT'S ARMS EXTENDED
HEADACHE, FULLNESS IN HEAD: NOT PRESENT
ORIENTATION AND CLOUDING OF SENSORIUM: ORIENTED AND CAN DO SERIAL ADDITIONS
PAROXYSMAL SWEATS: NO SWEAT VISIBLE
AGITATION: NORMAL ACTIVITY
NAUSEA AND VOMITING: NO NAUSEA AND NO VOMITING
ANXIETY: NO ANXIETY, AT EASE
ANXIETY: NO ANXIETY, AT EASE
PAROXYSMAL SWEATS: NO SWEAT VISIBLE
PAROXYSMAL SWEATS: NO SWEAT VISIBLE
HEADACHE, FULLNESS IN HEAD: NOT PRESENT
AGITATION: NORMAL ACTIVITY
TOTAL SCORE: 2
ORIENTATION AND CLOUDING OF SENSORIUM: ORIENTED AND CAN DO SERIAL ADDITIONS
TOTAL SCORE: 4
AGITATION: NORMAL ACTIVITY
TREMOR: 2
TOTAL SCORE: 10
VISUAL DISTURBANCES: NOT PRESENT
TREMOR: 2
PAROXYSMAL SWEATS: NO SWEAT VISIBLE
NAUSEA AND VOMITING: NO NAUSEA AND NO VOMITING
AUDITORY DISTURBANCES: NOT PRESENT
AUDITORY DISTURBANCES: NOT PRESENT
PAROXYSMAL SWEATS: NO SWEAT VISIBLE
AUDITORY DISTURBANCES: NOT PRESENT
HEADACHE, FULLNESS IN HEAD: NOT PRESENT
TOTAL SCORE: 6
VISUAL DISTURBANCES: NOT PRESENT
PAROXYSMAL SWEATS: NO SWEAT VISIBLE
TOTAL SCORE: 2
ORIENTATION AND CLOUDING OF SENSORIUM: ORIENTED AND CAN DO SERIAL ADDITIONS
AUDITORY DISTURBANCES: VERY MILD HARSHNESS OR ABILITY TO FRIGHTEN
AUDITORY DISTURBANCES: NOT PRESENT
ANXIETY: NO ANXIETY, AT EASE
TREMOR: NOT VISIBLE, BUT CAN BE FELT FINGERTIP TO FINGERTIP
TREMOR: SEVERE, EVEN WITH ARMS NOT EXTENDED
ANXIETY: 3
AGITATION: SOMEWHAT MORE THAN NORMAL ACTIVITY
AGITATION: NORMAL ACTIVITY
HEADACHE, FULLNESS IN HEAD: NOT PRESENT
VISUAL DISTURBANCES: VERY MILD SENSITIVITY
NAUSEA AND VOMITING: NO NAUSEA AND NO VOMITING
AUDITORY DISTURBANCES: NOT PRESENT
PAROXYSMAL SWEATS: NO SWEAT VISIBLE
ANXIETY: 2
VISUAL DISTURBANCES: NOT PRESENT
SKIP TO QUESTIONS 9-10: 0
ORIENTATION AND CLOUDING OF SENSORIUM: ORIENTED AND CAN DO SERIAL ADDITIONS
NAUSEA AND VOMITING: MILD NAUSEA WITH NO VOMITING
HEADACHE, FULLNESS IN HEAD: VERY MILD
ORIENTATION AND CLOUDING OF SENSORIUM: ORIENTED AND CAN DO SERIAL ADDITIONS
ORIENTATION AND CLOUDING OF SENSORIUM: ORIENTED AND CAN DO SERIAL ADDITIONS
NAUSEA AND VOMITING: NO NAUSEA AND NO VOMITING
ANXIETY: 2
TREMOR: NO TREMOR
AGITATION: SOMEWHAT MORE THAN NORMAL ACTIVITY
AUDIT-C TOTAL SCORE: 10
AUDITORY DISTURBANCES: NOT PRESENT
AGITATION: NORMAL ACTIVITY
VISUAL DISTURBANCES: VERY MILD SENSITIVITY
PAROXYSMAL SWEATS: NO SWEAT VISIBLE
VISUAL DISTURBANCES: NOT PRESENT
TOTAL SCORE: 1
TOTAL SCORE: 1
ANXIETY: 2
TOTAL SCORE: 11
TREMOR: 3
VISUAL DISTURBANCES: NOT PRESENT
AUDITORY DISTURBANCES: NOT PRESENT
NAUSEA AND VOMITING: NO NAUSEA AND NO VOMITING
ORIENTATION AND CLOUDING OF SENSORIUM: ORIENTED AND CAN DO SERIAL ADDITIONS
HEADACHE, FULLNESS IN HEAD: NOT PRESENT
VISUAL DISTURBANCES: NOT PRESENT

## 2024-08-27 ASSESSMENT — PAIN - FUNCTIONAL ASSESSMENT
PAIN_FUNCTIONAL_ASSESSMENT: 0-10

## 2024-08-27 ASSESSMENT — PAIN SCALES - GENERAL
PAINLEVEL_OUTOF10: 0 - NO PAIN
PAINLEVEL_OUTOF10: 2

## 2024-08-27 ASSESSMENT — PAIN DESCRIPTION - DESCRIPTORS: DESCRIPTORS: BURNING

## 2024-08-27 ASSESSMENT — ACTIVITIES OF DAILY LIVING (ADL): LACK_OF_TRANSPORTATION: NO

## 2024-08-27 NOTE — NURSING NOTE
Pt has a single lumen midline to R upper arm, drsg intact, there is bloody drainage under drsg, drsg was changed last night per pt. Pt does have low platelets at this time, will continue to monitor, fluids infusing without any difficulty.

## 2024-08-27 NOTE — PROGRESS NOTES
Occupational Therapy                 Therapy Communication Note    Patient Name: Israel Parikh  MRN: [id number]  Today's Date: 8/27/2024     Discipline: Occupational Therapy    Missed Visit Reason: Missed Visit Reason: Cancel (RN reporting uncontrollable bowels requiring multiple clean ups on this date.  RN deferring OT/PT at this time)    Missed Time: Cancel    Comment:

## 2024-08-27 NOTE — PROGRESS NOTES
Israel Parikh is a 68 y.o. male on day 1 of admission presenting with Gastrointestinal hemorrhage with hematemesis.      Subjective   Seen in the ICU. Currently sleeping after receiving ativan. Awakens with light touch. Denies any complaints currently. Discussed with patient's RN who states that he has had frequent diarrhea watery diarrhea. Stools are no longer bloody but are green in color. Diarrhea occurs immediately after eating. Diet was advanced to clears today by GI. Has been having increasing restlessness and tremors concerning for alcohol withdrawal.        Objective     Last Recorded Vitals  /71   Pulse 90   Temp 36.8 °C (98.2 °F) (Oral)   Resp 18   Wt 86.8 kg (191 lb 5.8 oz)   SpO2 99%   Intake/Output last 3 Shifts:    Intake/Output Summary (Last 24 hours) at 8/27/2024 1736  Last data filed at 8/27/2024 1036  Gross per 24 hour   Intake 3993.66 ml   Output 650 ml   Net 3343.66 ml       Admission Weight  Weight: 83.9 kg (185 lb) (08/26/24 0405)    Daily Weight  08/26/24 : 86.8 kg (191 lb 5.8 oz)    Image Results  ECG 12 lead  Sinus tachycardia with Premature atrial complexes with Aberrant conduction  Otherwise normal ECG  When compared with ECG of 22-MAY-2024 18:18,  Aberrant conduction is now Present  Nonspecific T wave abnormality has replaced inverted T waves in Inferior leads  Nonspecific T wave abnormality no longer evident in Lateral leads  Confirmed by Nick Espinoza ([id number]) on 8/27/2024 1:20:42 PM      Physical Exam  Constitutional:       Appearance: He is ill-appearing.      Comments: Sleeping comfortably   HENT:      Head: Normocephalic.      Mouth/Throat:      Pharynx: Oropharynx is clear.   Eyes:      General: No scleral icterus.  Cardiovascular:      Rate and Rhythm: Normal rate.   Pulmonary:      Effort: No respiratory distress.      Breath sounds: No wheezing.   Abdominal:      General: There is no distension.      Palpations: Abdomen is soft.   Musculoskeletal:      Right lower  leg: No edema.      Left lower leg: No edema.   Skin:     Coloration: Skin is pale.         Relevant Results               Assessment/Plan        Assessment & Plan  Gastrointestinal hemorrhage with hematemesis  GI following  S/p EGD 8/26/24 which showed hiatal hernia, small gastric varices, moderate esophagitis, xander-cortez tear of the esophagus s/p clipping x2, edematous mucosa in the 2nd part of the duodenum  Continue PPI BID, reglan, and bentyl  Diet per GI  Acute blood loss anemia  Baseline Hgb ~15  S/p 2 units RBCs, 1 unit FFP, and 1 unit platelets  Hgb now stable around 8  Transfuse for Hgb <7  Diarrhea of presumed infectious origin  >10 episodes of watery diarrhea today  CT A/P showing evidence of colitis  Noted to have fever Tmax 103 on 8/26  Suspect patient likely has infectious diarrhea  Check C. Diff and stool pathogens  Given chronic alcohol use, would not be surprised if patient also has some component of pancreatic insufficiency, can consider trial of creon and/or loperamide if stool studies are negative for infection  Continue aggressive IVF until able to tolerate PO  Alcohol use with withdrawal (Multi)  Patient endorses active alcohol use  CIWA protocol  Thiamine, folate, multivitamin  Seizure, fall, and aspiration precautions  Recommend cessation  Alcoholic cirrhosis of liver with ascites (Multi)  MELD score of 24 on admission  Transaminitis improving  RUQ US ordered by GI  Thrombocytopenia (CMS-HCC)  Likely due to cirrhosis    DVT ppx: SCDs  GI ppx: PPI BID    Discussed with care coordinator - [facility]     Plan:  Follow up stool studies  Continue CIWA for alcohol withdrawal, low threshold for transfer back to ICU if patient has worsening CIWA scores  Fall, aspiration, and seizure precautions  Check labs in the AM                  Arleth Lisa MD

## 2024-08-27 NOTE — ASSESSMENT & PLAN NOTE
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

## 2024-08-27 NOTE — PROGRESS NOTES
"Israel Parikh is a 68 y.o. male on day 1 of admission presenting with Hematemesis with nausea.    Subjective   Patient with constant hiccups since bleeding started. Some epigastric pain. Denies active emesis today        Objective     Physical Exam  Vitals reviewed.   Constitutional:       General: He is awake.      Appearance: Normal appearance.      Comments: Uncomfortable, Hiccups    HENT:      Head: Normocephalic and atraumatic.      Mouth/Throat:      Mouth: Mucous membranes are moist.   Cardiovascular:      Rate and Rhythm: Normal rate and regular rhythm.   Pulmonary:      Effort: Pulmonary effort is normal.      Breath sounds: Normal breath sounds.   Abdominal:      General: There is no distension.      Palpations: Abdomen is soft.      Tenderness: There is no abdominal tenderness. There is no guarding.   Musculoskeletal:      Cervical back: Normal range of motion and neck supple.   Skin:     General: Skin is warm and dry.   Neurological:      General: No focal deficit present.      Mental Status: He is alert and oriented to person, place, and time. Mental status is at baseline.   Psychiatric:         Attention and Perception: Attention and perception normal.         Mood and Affect: Mood normal.         Behavior: Behavior normal.         Last Recorded Vitals  Blood pressure 108/71, pulse 90, temperature 36.9 °C (98.4 °F), temperature source Oral, resp. rate 18, height 1.753 m (5' 9.02\"), weight 86.8 kg (191 lb 5.8 oz), SpO2 99%.  Intake/Output last 3 Shifts:  I/O last 3 completed shifts:  In: 5407 (62.3 mL/kg) [P.O.:440; I.V.:3096.7 (35.7 mL/kg); Blood:1770.3; IV Piggyback:100]  Out: 925 (10.7 mL/kg) [Urine:925 (0.3 mL/kg/hr)]  Weight: 86.8 kg     Relevant Results                 Results for orders placed or performed during the hospital encounter of 08/26/24 (from the past 24 hour(s))   VERIFY ABO/Rh Group Test   Result Value Ref Range    ABO TYPE AB     Rh TYPE POS    POCT GLUCOSE   Result Value Ref Range " Patient canceled appointment with Dr. Wong scheduled 1/16/17 and rescheduled for 2/7/17.  She called as she is out of Topamax and requests ASAP refill.  I see refill order in computer.  Please sign ASAP so she can  her medication today.  I instructed her to ask her pharmacy for a three day emergency supply, as she was enroute 20 miles to pharmacy, at time of call, and hoped medication would be refilled immediately.  Paris Morales RN      POCT Glucose 124 (H) 74 - 99 mg/dL   Transthoracic Echo (TTE) Complete   Result Value Ref Range    AV pk garry 1.77 m/s    LVOT diam 2.37 cm    MV E/A ratio 1.29     Tricuspid annular plane systolic excursion 2.0 cm    LV Biplane EF 55 %    LA vol index A/L 51.9 ml/m2    MV avg E/e' ratio 6.75     LV EF 63 %    RV free wall pk S' 22.47 cm/s    LV GLS 21.0 %    RVSP 29.7 mmHg    LVIDd 4.33 cm    AV pk grad 12.5 mmHg    Aortic Valve Area by Continuity of Peak Velocity 2.94 cm2    LV A4C EF 55.9    POCT GLUCOSE   Result Value Ref Range    POCT Glucose 117 (H) 74 - 99 mg/dL   CBC and Auto Differential   Result Value Ref Range    WBC 4.5 4.4 - 11.3 x10*3/uL    nRBC 0.0 0.0 - 0.0 /100 WBCs    RBC 2.66 (L) 4.50 - 5.90 x10*6/uL    Hemoglobin 8.3 (L) 13.5 - 17.5 g/dL    Hematocrit 24.1 (L) 41.0 - 52.0 %    MCV 91 80 - 100 fL    MCH 31.2 26.0 - 34.0 pg    MCHC 34.4 32.0 - 36.0 g/dL    RDW 15.8 (H) 11.5 - 14.5 %    Platelets 32 (LL) 150 - 450 x10*3/uL    Neutrophils % 53.9 40.0 - 80.0 %    Immature Granulocytes %, Automated 0.2 0.0 - 0.9 %    Lymphocytes % 32.1 13.0 - 44.0 %    Monocytes % 11.6 2.0 - 10.0 %    Eosinophils % 2.0 0.0 - 6.0 %    Basophils % 0.2 0.0 - 2.0 %    Neutrophils Absolute 2.41 1.20 - 7.70 x10*3/uL    Immature Granulocytes Absolute, Automated 0.01 0.00 - 0.70 x10*3/uL    Lymphocytes Absolute 1.44 1.20 - 4.80 x10*3/uL    Monocytes Absolute 0.52 0.10 - 1.00 x10*3/uL    Eosinophils Absolute 0.09 0.00 - 0.70 x10*3/uL    Basophils Absolute 0.01 0.00 - 0.10 x10*3/uL   POCT GLUCOSE   Result Value Ref Range    POCT Glucose 104 (H) 74 - 99 mg/dL   Coagulation Screen   Result Value Ref Range    Protime 18.9 (H) 9.3 - 12.7 seconds    INR 1.9 (H) 0.9 - 1.2    aPTT 28.0 22.0 - 32.5 seconds   POCT GLUCOSE   Result Value Ref Range    POCT Glucose 102 (H) 74 - 99 mg/dL   POCT GLUCOSE   Result Value Ref Range    POCT Glucose 101 (H) 74 - 99 mg/dL   CBC and Auto Differential   Result Value Ref Range    WBC 4.3 (L) 4.4 -  11.3 x10*3/uL    nRBC 0.0 0.0 - 0.0 /100 WBCs    RBC 2.58 (L) 4.50 - 5.90 x10*6/uL    Hemoglobin 8.2 (L) 13.5 - 17.5 g/dL    Hematocrit 23.1 (L) 41.0 - 52.0 %    MCV 90 80 - 100 fL    MCH 31.8 26.0 - 34.0 pg    MCHC 35.5 32.0 - 36.0 g/dL    RDW 16.1 (H) 11.5 - 14.5 %    Platelets 31 (LL) 150 - 450 x10*3/uL    Neutrophils % 53.0 40.0 - 80.0 %    Immature Granulocytes %, Automated 0.5 0.0 - 0.9 %    Lymphocytes % 30.9 13.0 - 44.0 %    Monocytes % 13.2 2.0 - 10.0 %    Eosinophils % 1.9 0.0 - 6.0 %    Basophils % 0.5 0.0 - 2.0 %    Neutrophils Absolute 2.29 1.20 - 7.70 x10*3/uL    Immature Granulocytes Absolute, Automated 0.02 0.00 - 0.70 x10*3/uL    Lymphocytes Absolute 1.33 1.20 - 4.80 x10*3/uL    Monocytes Absolute 0.57 0.10 - 1.00 x10*3/uL    Eosinophils Absolute 0.08 0.00 - 0.70 x10*3/uL    Basophils Absolute 0.02 0.00 - 0.10 x10*3/uL   Renal function panel   Result Value Ref Range    Glucose 99 65 - 99 mg/dL    Sodium 135 133 - 145 mmol/L    Potassium 3.0 (L) 3.4 - 5.1 mmol/L    Chloride 101 97 - 107 mmol/L    Bicarbonate 23 (L) 24 - 31 mmol/L    Urea Nitrogen 15 8 - 25 mg/dL    Creatinine 0.50 0.40 - 1.60 mg/dL    eGFR >90 >60 mL/min/1.73m*2    Calcium 8.2 (L) 8.5 - 10.4 mg/dL    Phosphorus 1.4 (L) 2.5 - 4.5 mg/dL    Albumin 2.9 (L) 3.5 - 5.0 g/dL    Anion Gap 11 <=19 mmol/L   Magnesium   Result Value Ref Range    Magnesium 1.30 (L) 1.60 - 3.10 mg/dL   POCT GLUCOSE   Result Value Ref Range    POCT Glucose 103 (H) 74 - 99 mg/dL   CBC and Auto Differential   Result Value Ref Range    WBC 4.0 (L) 4.4 - 11.3 x10*3/uL    nRBC 0.0 0.0 - 0.0 /100 WBCs    RBC 2.47 (L) 4.50 - 5.90 x10*6/uL    Hemoglobin 8.0 (L) 13.5 - 17.5 g/dL    Hematocrit 22.5 (L) 41.0 - 52.0 %    MCV 91 80 - 100 fL    MCH 32.4 26.0 - 34.0 pg    MCHC 35.6 32.0 - 36.0 g/dL    RDW 16.6 (H) 11.5 - 14.5 %    Platelets 31 (LL) 150 - 450 x10*3/uL    Neutrophils % 55.3 40.0 - 80.0 %    Immature Granulocytes %, Automated 0.2 0.0 - 0.9 %    Lymphocytes % 28.4  13.0 - 44.0 %    Monocytes % 13.7 2.0 - 10.0 %    Eosinophils % 2.2 0.0 - 6.0 %    Basophils % 0.2 0.0 - 2.0 %    Neutrophils Absolute 2.21 1.20 - 7.70 x10*3/uL    Immature Granulocytes Absolute, Automated 0.01 0.00 - 0.70 x10*3/uL    Lymphocytes Absolute 1.14 (L) 1.20 - 4.80 x10*3/uL    Monocytes Absolute 0.55 0.10 - 1.00 x10*3/uL    Eosinophils Absolute 0.09 0.00 - 0.70 x10*3/uL    Basophils Absolute 0.01 0.00 - 0.10 x10*3/uL   POCT GLUCOSE   Result Value Ref Range    POCT Glucose 100 (H) 74 - 99 mg/dL   Comprehensive Metabolic Panel   Result Value Ref Range    Glucose 102 (H) 65 - 99 mg/dL    Sodium 135 133 - 145 mmol/L    Potassium 3.1 (L) 3.4 - 5.1 mmol/L    Chloride 101 97 - 107 mmol/L    Bicarbonate 24 24 - 31 mmol/L    Urea Nitrogen 11 8 - 25 mg/dL    Creatinine 0.50 0.40 - 1.60 mg/dL    eGFR >90 >60 mL/min/1.73m*2    Calcium 7.8 (L) 8.5 - 10.4 mg/dL    Albumin 2.9 (L) 3.5 - 5.0 g/dL    Alkaline Phosphatase 46 35 - 125 U/L    Total Protein 4.1 (L) 5.9 - 7.9 g/dL     (H) 5 - 40 U/L    Bilirubin, Total 7.1 (H) 0.1 - 1.2 mg/dL    ALT 57 (H) 5 - 40 U/L    Anion Gap 10 <=19 mmol/L   Magnesium   Result Value Ref Range    Magnesium 1.60 1.60 - 3.10 mg/dL   Phosphorus   Result Value Ref Range    Phosphorus 2.5 2.5 - 4.5 mg/dL   CBC and Auto Differential   Result Value Ref Range    WBC 4.0 (L) 4.4 - 11.3 x10*3/uL    nRBC 0.0 0.0 - 0.0 /100 WBCs    RBC 2.49 (L) 4.50 - 5.90 x10*6/uL    Hemoglobin 7.8 (L) 13.5 - 17.5 g/dL    Hematocrit 22.2 (L) 41.0 - 52.0 %    MCV 89 80 - 100 fL    MCH 31.3 26.0 - 34.0 pg    MCHC 35.1 32.0 - 36.0 g/dL    RDW 16.5 (H) 11.5 - 14.5 %    Platelets 29 (LL) 150 - 450 x10*3/uL    Neutrophils % 50.3 40.0 - 80.0 %    Immature Granulocytes %, Automated 0.3 0.0 - 0.9 %    Lymphocytes % 32.3 13.0 - 44.0 %    Monocytes % 13.5 2.0 - 10.0 %    Eosinophils % 3.3 0.0 - 6.0 %    Basophils % 0.3 0.0 - 2.0 %    Neutrophils Absolute 2.01 1.20 - 7.70 x10*3/uL    Immature Granulocytes Absolute,  Automated 0.01 0.00 - 0.70 x10*3/uL    Lymphocytes Absolute 1.29 1.20 - 4.80 x10*3/uL    Monocytes Absolute 0.54 0.10 - 1.00 x10*3/uL    Eosinophils Absolute 0.13 0.00 - 0.70 x10*3/uL    Basophils Absolute 0.01 0.00 - 0.10 x10*3/uL   POCT GLUCOSE   Result Value Ref Range    POCT Glucose 86 74 - 99 mg/dL   Calcium, ionized   Result Value Ref Range    POCT Calcium, Ionized 1.08 (L) 1.1 - 1.33 mmol/L     Transthoracic Echo (TTE) Complete    Result Date: 8/26/2024           [place] [address]            Phone [phone] TRANSTHORACIC ECHOCARDIOGRAM REPORT Patient Name:     ISRAEL Jiang Physician:  [id number] Israel May MD Study Date:       8/26/2024            Ordering Provider:  [id number] JOSI HUSAIN MRN/PID:          [id number]             Fellow: Accession#:       [id number]         Nurse: Date of           1955 / 68      Sonographer:        Max Sneed TRUDY Birth/Age:        years Gender:           M                    Additional Staff: Height:           175.26 cm            Admit Date: Weight:           83.91 kg             Admission Status:   Inpatient - Routine BSA / BMI:        2.00 m2 / 27.32      Department          [facility] ICU                   kg/m2                Location: Blood Pressure: 100 /64 mmHg Study Type:    TRANSTHORACIC ECHO (TTE) COMPLETE Diagnosis/ICD: Hypovolemic shock-R57.1 Indication:    shock CPT Codes:     Echo Complete w Full Doppler-13032; Myocardial Strain                Imaging-[id number] Patient History: Pertinent History: ETOH abuse, alcoholic cirrhosis. Study Detail: The following Echo studies were performed: 2D, M-Mode, Doppler and               color flow. Technically challenging study due to body habitus,               patient lying in supine position and the patient's lack of                cooperation.  PHYSICIAN INTERPRETATION: Left Ventricle: The left ventricular systolic function is normal, with a visually estimated ejection fraction of 60-65%. There are no regional wall motion abnormalities. The left ventricular cavity size is normal. Left Ventricular Global Longitudinal Strain - 21.0 %. Spectral Doppler shows a normal pattern of left ventricular diastolic filling. Left Atrium: The left atrium is normal in size. Right Ventricle: The right ventricle is normal in size. There is normal right ventricular global systolic function. Right Atrium: The right atrium is normal in size. Aortic Valve: The aortic valve appears structurally normal. There is no evidence of aortic valve regurgitation. The peak instantaneous gradient of the aortic valve is 12.5 mmHg. Mitral Valve: The mitral valve is normal in structure. There is trace to mild mitral valve regurgitation. Tricuspid Valve: The tricuspid valve is structurally normal. There is mild tricuspid regurgitation. The Doppler estimated RVSP is within normal limits at 29.7 mmHg. Pulmonic Valve: The pulmonic valve is structurally normal. There is no indication of pulmonic valve regurgitation. Pericardium: There is no pericardial effusion noted. Aorta: The aortic root is normal.  CONCLUSIONS:  1. The left ventricular systolic function is normal, with a visually estimated ejection fraction of 60-65%.  2. There is normal right ventricular global systolic function.  3. RVSP within normal limits. QUANTITATIVE DATA SUMMARY: 2D MEASUREMENTS:                          Normal Ranges: LAs:           4.09 cm   (2.7-4.0cm) IVSd:          1.11 cm   (0.6-1.1cm) LVPWd:         1.03 cm   (0.6-1.1cm) LVIDd:         4.33 cm   (3.9-5.9cm) LVIDs:         2.41 cm LV Mass Index: 79.6 g/m2 LV % FS        44.5 % LA VOLUME:                              Normal Ranges: LA Vol A4C:       145.9 ml   (22+/-6mL/m2) LA Vol A2C:       63.4 ml LA Vol BP:        103.7 ml LA Vol Index A4C: 73.0  ml/m2 LA Vol Index A2C: 31.7 ml/m2 LA Vol Index BP:  51.9 ml/m2 LA Volume Index:  52.0 ml/m2 LA Vol A4C:       136.1 ml LA Vol A2C:       59.8 ml LA Vol Index BSA: 49.0 ml/m2 RA VOLUME BY A/L METHOD:                       Normal Ranges: RA Area A4C: 14.0 cm2 M-MODE MEASUREMENTS:              Normal Ranges: LAs: 4.87 cm (2.7-4.0cm) LV SYSTOLIC FUNCTION BY 2D PLANIMETRY (MOD):                                        Normal Ranges: EF-A4C View:                      56 % (>=55%) EF-A2C View:                      58 % EF-Biplane:                       55 % EF-Visual:                        63 % EF-Auto:                          52 % LV EF Reported:                   63 % Global Longitudinal Strain (GLS): 21 % LV DIASTOLIC FUNCTION:                          Normal Ranges: MV Peak E:    0.72 m/s   (0.7-1.2 m/s) MV Peak A:    0.56 m/s   (0.42-0.7 m/s) E/A Ratio:    1.29       (1.0-2.2) MV e'         0.113 m/s  (>8.0) MV lateral e' 0.12 m/s MV medial e'  0.11 m/s MV A Dur:     94.20 msec E/e' Ratio:   6.35       (<8.0) MITRAL VALVE:                 Normal Ranges: MV DT: 175 msec (150-240msec) AORTIC VALVE:                          Normal Ranges: AoV Vmax:      1.77 m/s  (<=1.7m/s) AoV Peak P.5 mmHg (<20mmHg) LVOT Max Zion:  1.18 m/s  (<=1.1m/s) LVOT VTI:      20.14 cm LVOT Diameter: 2.37 cm   (1.8-2.4cm) AoV Area,Vmax: 2.94 cm2  (2.5-4.5cm2)  RIGHT VENTRICLE: RV Basal 3.21 cm RV Major 8.0 cm TAPSE:   19.6 mm RV s'    0.22 m/s TRICUSPID VALVE/RVSP:                             Normal Ranges: Peak TR Velocity: 2.58 m/s RV Syst Pressure: 29.7 mmHg (< 30mmHg) PULMONIC VALVE:                      Normal Ranges: PV Max Zion: 1.3 m/s  (0.6-0.9m/s) PV Max P.3 mmHg PV Mean PG: 3.3 mmHg PV VTI:     22.76 cm AORTA: Asc Ao Diam 3.82 cm  [id number] Israel May MD Electronically signed on 2024 at 1:55:26 PM  ** Final **     CT angio abdomen pelvis w and or wo IV IV contrast    Result Date: 2024  Interpreted By:  Hay  Rojas, STUDY: CT ANGIO ABDOMEN PELVIS W AND/OR WO IV IV CONTRAST;  8/26/2024 1:52 pm   INDICATION: Signs/Symptoms:GI bleed   COMPARISON: 08/26/2024   ACCESSION NUMBER(S): [id number]   ORDERING CLINICIAN: MARLON MORENO   TECHNIQUE: Thin-section axial images of the abdomen and pelvis in the arterial phase after intravenous administration of  75 mL Omnipaque 350. Sagittal and coronal reconstructions. 3D reconstructions were obtained at a separate workstation.   FINDINGS: Vascular:  No aortic aneurysm or dissection.   The celiac trunk, superior mesenteric artery, renal arteries and inferior mesenteric artery are patent.   LOWER CHEST: No acute abnormality of the lung bases. BONES: No acute osseous abnormality. ABDOMINAL WALL: Within normal limits.   ABDOMEN:   LIVER: Cirrhotic appearing liver. Recanalization of the umbilical vein. BILE DUCTS: Normal caliber. GALLBLADDER: Dependent gallstone. No wall thickening. PANCREAS: Within normal limits. SPLEEN: Within normal limits. ADRENALS: Within normal limits. KIDNEYS and URETERS: Symmetric renal enhancement. No hydronephrosis.   RETROPERITONEUM: No pathologically enlarged retroperitoneal lymph nodes.   PELVIS:   REPRODUCTIVE ORGANS: No pelvic masses. BLADDER: Bladder is opacified with excreted contrast from prior examination. No acute abnormalities.   BOWEL: Hiatal hernia. New metallic density within the distal esophagus likely related to patient's recent endoscopy and clip placement. No obstruction. Continued wall thickening of the ascending colon. No active extravasation within the GI tract. High density fluid within the rectum seen on all phases. PERITONEUM: Small volume ascites.       1. No active extravasation within the GI tract. High density fluid in the rectum as can be seen with acute blood products. This finding is consistent on all phases. 2. Cirrhosis and sequela there of.   MACRO: none   Signed by: Rojas Guido 8/26/2024 2:04 PM Dictation workstation:    [id number]    Esophagogastroduodenoscopy (EGD)    Result Date: 8/26/2024  Table formatting from the original result was not included. Impression 5 cm type I hiatal hernia with Reginaldo lesions present Small varices in the cardia Esophagitis in the lower third of the esophagus Shi-Sands tear limited to the mucosa in the esophagus; there was stigmata of recent hemorrhage; placed 2 clips successfully; hemostasis achieved Edematous mucosa in the 2nd part of the duodenum No evidence of esophageal varices noted. Findings 5 cm sliding hiatal hernia (type I hiatal hernia) with Reginaldo lesions present. Hill grade IV hiatal hernia Small varices in the cardia; no bleeding was identified. Small gastric varices noted with no signs or stigmata of upper GI bleeding noted. Moderate esophagitis appearing friable and ulcerated in the lower third of the esophagus Shi-Sands tear limited to the mucosa in the esophagus; there was stigmata of recent hemorrhage; placed 2 clips successfully (clips are through-the-scope); hemostasis achieved Edematous mucosa in the 2nd part of the duodenum. No blood noted in upper GI tract to second portion of duodenum. No evidence of esophageal varices noted. Recommendation Order STAT CT angio to evaluate to localized source of of possible ongoing bleed. Shi Sands tear treated with clip placement Continue PPI 40mg IV BID Okay to discontinue octreotide Will follow along closely. Keep NPO for now. ICU team at bedside at time of procedure and findings directly conveyed to attending physician, Dr. العلي.  Indication Hematemesis with nausea Staff Staff Role Rishi Davidson MD Proceduralist Medications No administrations occurring from 1113 to 1142 on 08/26/24 Preprocedure A history and physical has been performed, and patient medication allergies have been reviewed. The patient's tolerance of previous anesthesia has been reviewed. The risks and benefits of the procedure and the sedation options and  risks were discussed with the patient. All questions were answered and informed consent obtained. Details of the Procedure The patient was already under sedation prior to the procedure. The patient's blood pressure, ECG, ETCO2, heart rate, level of consciousness, respirations and oxygen were monitored throughout the procedure. The scope was introduced through the mouth and advanced to the second part of the duodenum. Retroflexion was performed in the cardia. The patient experienced no blood loss. The procedure was not difficult. The patient tolerated the procedure well. There were no apparent adverse events. Events Procedure Events Event Event Time ENDO SCOPE IN TIME 8/26/2024 11:30 AM ENDO SCOPE OUT TIME 8/26/2024 11:39 AM Specimens No specimens collected Procedure Location [address] Cardiac Intensive Care [address] [facility] [address] [phone] Referring Provider EDIE Mathew-CNP Procedure Provider Rishi Davidson MD     Bedside Midline Imaging    Result Date: 8/26/2024  These images are not reportable by radiology and will not be interpreted by  Radiologists.    ECG 12 lead    Result Date: 8/26/2024  Sinus tachycardia with Premature atrial complexes with Aberrant conduction Otherwise normal ECG When compared with ECG of 22-MAY-2024 18:18, Aberrant conduction is now Present Nonspecific T wave abnormality has replaced inverted T waves in Inferior leads Nonspecific T wave abnormality no longer evident in Lateral leads    CT abdomen pelvis w IV contrast    Result Date: 8/26/2024  Interpreted By:  Deangelo Hobbs, STUDY: CT ABDOMEN PELVIS W IV CONTRAST;  8/26/2024 5:27 am   INDICATION: Signs/Symptoms:abdominal pain, nausea, vomiting, diarrhea, hx of hernia repair x3.   COMPARISON: CT abdomen pelvis dated 03/07/2023.   ACCESSION NUMBER(S): [id number]   ORDERING CLINICIAN: DARREN JACOBSON   TECHNIQUE: CT of the abdomen and pelvis was  performed.  Standard contiguous axial images were obtained through the abdomen and pelvis. Coronal and sagittal reconstructions were performed.   75 ml of contrast Omnipaque 350 were administered intravenously without immediate complication.   FINDINGS: LOWER CHEST: No acute abnormality of the lung bases. Mild bibasilar linear atelectasis. BONES: No acute osseous abnormality. ABDOMINAL WALL: Within normal limits. LYMPH NODES: No pathologically enlarged lymph nodes in the abdomen or pelvis.   ABDOMEN:   LIVER: Liver is diffusely hypodense consistent with hepatic steatosis. There is interval development of nodular liver contour consistent with cirrhosis. There is recanalization of the paraumbilical vein. BILE DUCTS: Normal caliber. GALLBLADDER: There is a gallbladder calculus without gallbladder wall thickening or distention. PANCREAS: No evidence of pancreatic duct dilatation or peripancreatic edema. SPLEEN: Pancreas is not enlarged and there is no pancreatic ductal dilatation. There is some mesenteric edema in the anterior pararenal spaces likely not related to the pancreas. ADRENALS: Within normal limits. KIDNEYS and URETERS: No evidence of hydronephrosis or nephrolithiasis. Normal size and enhancement pattern.     VESSELS: No aortic aneurysm. Mesenteric vessels are patent. Mild atherosclerotic calcifications of the abdominal aorta. RETROPERITONEUM: No evidence of retroperitoneal hematoma.   PELVIS:   REPRODUCTIVE ORGANS: No pelvic masses. BLADDER: No gross abnormality.   BOWEL: Moderate hiatal hernia similar to prior. Stomach is otherwise collapsed limiting assessment. No bowel dilatation. Normal appendix. There is diffuse wall thickening of the ascending and to lesser extent more distal colon compatible with pancolitis. Sigmoid diverticulosis without diverticulitis. PERITONEUM: No free air, no fluid collection. There is small amount of predominantly perihepatic ascites and small amount of fluid in the bilateral  "anterior pararenal spaces and diffuse mesenteric edema.       1.  Cirrhotic liver with recanalization of the paraumbilical vein. Small amount of predominantly upper quadrant and right paracolic gutter ascites, likely due to liver cirrhosis. However correlation with pancreatic and liver enzymes is recommended to exclude superimposed pancreatitis. 2. Moderate hiatal hernia similar to prior. Stomach is collapsed limiting assessment. Circumferential thickening of the colon compatible with colitis. This may be due to infection, inflammation, or reactive in the setting of portal colopathy.     Signed by: Deangelo Hobbs 8/26/2024 6:01 AM Dictation workstation:   [id number]                Assessment/Plan   Assessment & Plan  Hematemesis with nausea    GI Bleed, Anemia  EGD per Dr Davidson 8/26 showing MWT. 2 clips placed. Patient reports sudden onset of vomiting and bloody stools at same time as \"these hiccups\" CTA without source of bleeding. There was some question of ascending colon thickening. Seems less likely lower GI source. Possible this was related to MWT with his low Plts and raised INR     -Continue PPI BID    -Clear liquid diet    -Add scheduled Reglan and Bentyl      Elevated LFTs, Thrombocytopenia, Abnormal CT               -RUQ US    -Acute hep panel    -INR    -Likely new cirrhosis in setting chronic ETOH abuse. [facility]        I spent 20 minutes in the professional and overall care of this patient.      Flor Yanez, EDIE-CNP      "

## 2024-08-27 NOTE — CARE PLAN
The patient's goals for the shift include feel better    The clinical goals for the shift include remain hemodynamically stable      Problem: Fall/Injury  Goal: Not fall by end of shift  Outcome: Progressing  Goal: Be free from injury by end of the shift  Outcome: Progressing  Goal: Verbalize understanding of personal risk factors for fall in the hospital  Outcome: Progressing  Goal: Verbalize understanding of risk factor reduction measures to prevent injury from fall in the home  Outcome: Progressing  Goal: Use assistive devices by end of the shift  Outcome: Progressing  Goal: Pace activities to prevent fatigue by end of the shift  Outcome: Progressing     Problem: Pain  Goal: Takes deep breaths with improved pain control throughout the shift  Outcome: Progressing  Goal: Turns in bed with improved pain control throughout the shift  Outcome: Progressing  Goal: Walks with improved pain control throughout the shift  Outcome: Progressing  Goal: Performs ADL's with improved pain control throughout shift  Outcome: Progressing  Goal: Participates in PT with improved pain control throughout the shift  Outcome: Progressing  Goal: Free from opioid side effects throughout the shift  Outcome: Progressing  Goal: Free from acute confusion related to pain meds throughout the shift  Outcome: Progressing     Problem: Skin  Goal: Participates in plan/prevention/treatment measures  Outcome: Progressing  Flowsheets (Taken 8/27/2024 0919)  Participates in plan/prevention/treatment measures: Elevate heels  Goal: Prevent/manage excess moisture  Outcome: Progressing  Flowsheets (Taken 8/27/2024 0919)  Prevent/manage excess moisture: Moisturize dry skin  Goal: Prevent/minimize sheer/friction injuries  Outcome: Progressing  Flowsheets (Taken 8/27/2024 0919)  Prevent/minimize sheer/friction injuries:   HOB 30 degrees or less   Increase activity/out of bed for meals   Turn/reposition every 2 hours/use positioning/transfer devices  Goal:  Promote/optimize nutrition  Outcome: Progressing  Flowsheets (Taken 8/27/2024 0919)  Promote/optimize nutrition:   Monitor/record intake including meals   Discuss with provider if NPO > 2 days  Goal: Promote skin healing  Outcome: Progressing  Flowsheets (Taken 8/27/2024 0919)  Promote skin healing:   Ensure correct size (line/device) and apply per  instructions   Protective dressings over bony prominences   Rotate device position/do not position patient on device

## 2024-08-27 NOTE — ASSESSMENT & PLAN NOTE
Patient endorses active alcohol use  [facility] protocol  Thiamine, folate, multivitamin  Seizure, fall, and aspiration precautions  Recommend cessation

## 2024-08-27 NOTE — PROGRESS NOTES
Physical Therapy                 Therapy Communication Note    Patient Name: Israel Parikh  MRN: [id number]  Today's Date: 8/27/2024     Discipline: Physical Therapy    Missed Visit Reason: Cancel   (Hold per nsg request. Will hold eval at this time.)    Missed Time: Cancel    Comment:

## 2024-08-27 NOTE — PROGRESS NOTES
T[facility] met with patient. Assessment complete. Patient lives alone in a house. Patient states that he is normally independent, but has been increasingly weaker recently. Patient reports several falls. Patient uses a cane and rollator. Patient drives and can shop, cook and clean but that has been more difficult. Patient drinks about a pint of whiskey per day. [facility] but patient reports that he stopped taking all medications. [facility], [facility] faxed all clinicals. Will continue to follow.      08/27/24 3386   Discharge Planning   Living Arrangements Alone   Support Systems None   Type of Residence Private residence   Home or Post Acute Services Other (Comment)  (TBD)   Expected Discharge Disposition Other  (TBD)   Does the patient need discharge transport arranged? Yes   RoundTrip coordination needed? Yes   Financial Resource Strain   How hard is it for you to pay for the very basics like food, housing, medical care, and heating? Not very   Housing Stability   In the last 12 months, was there a time when you were not able to pay the mortgage or rent on time? N   In the past 12 months, how many times have you moved where you were living? 0   At any time in the past 12 months, were you homeless or living in a shelter (including now)? N   Transportation Needs   In the past 12 months, has lack of transportation kept you from medical appointments or from getting medications? no   In the past 12 months, has lack of transportation kept you from meetings, work, or from getting things needed for daily living? No

## 2024-08-27 NOTE — PROGRESS NOTES
Critical Care Medicine       Date:  8/27/2024  Patient:  Israel Parikh  YOB: 1955  MRN:  [id number]   Admit Date:  8/26/2024      Chief Complaint   Patient presents with    Weakness, Gen    Vomiting Blood         History of Present Illness:  Israel Parikh is a 68 y.o. year old male patient with past medical history that he is not aware of. He states that he has not taking any medications in the past 6 months. He presented to the ER for GI bleed. Patient states he has had nausea and vomiting for the past several days. He notes blood within the vomit, described as dark coffee grounds. Patient also notes multiple episodes of diarrhea, noted to have stool in his pants. Patient states [facility] and upon returning home has been too weak to purchase groceries and therefore has not eaten anything in the past several days. Denies fevers. No abdominal pain. No prior history of gastric ulcers. States that he drinks about 1-2 L of whiskey per day, but when he does drink he drinks at least 1 pint. Last drink was 2 days ago      Patient originally admitted to step down but had multiple large bloody BM in the ER. Hgb was 10 when baseline is 14-15. Patient is hemodynamically stable but Hgb dropped from 10 -> 5 after blood BM. Will admit to ICU.      Interval ICU Events:  8/26: Patients vitals remain stable. Hgb: 5.5. 2u PRBCs, 1u PLT, and 1u of FFP that we will hang when it is ready. GI was consulted and will do a scope at bedside. We will cont to monitor hemodynamics and intiate MTP if indicated.    8/27: Patient had EGD yesterday with 2 clips placed. Patient BM starting to become less bloody. Hgb stable. 3 BM today non bloody. Plan to transfer to the floor today.     Objective     Past Medical History:   Diagnosis Date    HTN (hypertension)      Past Surgical History:   Procedure Laterality Date    ANKLE SURGERY      CT ANGIO NECK  03/07/2023    CT NECK ANGIO W AND WO IV  CONTRAST [facility] CT    NECK SURGERY       No medications prior to admission.     Patient has no known allergies.  Social History     Tobacco Use    Smoking status: Former     Types: Cigarettes     Passive exposure: Never    Smokeless tobacco: Never   Substance Use Topics    Alcohol use: Yes     Comment: rarely    Drug use: Never     No family history on file.    Hospital Medications:    lactated Ringer's, 200 mL/hr, Last Rate: 200 mL/hr (08/27/24 0441)  propofol, 0-50 mcg/kg/min, Last Rate: Stopped (08/26/24 1139)          Current Facility-Administered Medications:     dextrose 50 % injection 12.5 g, 12.5 g, intravenous, q15 min PRN, Maddi Latif PA-C    dextrose 50 % injection 25 g, 25 g, intravenous, q15 min PRN, Maddi Latif PA-C    pantoprazole (ProtoNix) EC tablet 40 mg, 40 mg, oral, BID **OR** esomeprazole (NexIUM) suspension 40 mg, 40 mg, nasoduodenal tube, BID **OR** pantoprazole (ProtoNix) injection 40 mg, 40 mg, intravenous, BID, Maddi Latif PA-C, 40 mg at 08/26/24 2013    folic acid (Folvite) tablet 1 mg, 1 mg, oral, Daily, Maddi Latif PA-C    glucagon (Glucagen) injection 1 mg, 1 mg, intramuscular, q15 min PRN, Maddi Latif PA-C    glucagon (Glucagen) injection 1 mg, 1 mg, intramuscular, q15 min PRN, Maddi Latif PA-C    insulin lispro (HumaLOG) injection 0-5 Units, 0-5 Units, subcutaneous, q4h, Sebastian Ash PA-C    lactated Ringer's infusion, 200 mL/hr, intravenous, Continuous, Maddi Latif PA-C, Last Rate: 200 mL/hr at 08/27/24 0441, 200 mL/hr at 08/27/24 0441    magnesium sulfate 2 g in sterile water for injection 50 mL, 2 g, intravenous, Once, Sebastian Ash PA-C, Last Rate: 25 mL/hr at 08/27/24 0631, 2 g at 08/27/24 0631    multivitamin with minerals 1 tablet, 1 tablet, oral, Daily, Maddi Latif PA-C    ondansetron ODT (Zofran-ODT) disintegrating tablet 4 mg, 4 mg, oral, q8h PRN **OR** ondansetron (Zofran) injection 4 mg, 4 mg, intravenous, q8h PRN, Maddi Latif PA-C, 4 mg  "at 08/26/24 1455    oxygen (O2) therapy, , inhalation, Continuous - Inhalation, Peña العلي MD, 4 L/min at 08/26/24 2000    potassium chloride 20 mEq in sterile water for injection 100 mL, 20 mEq, intravenous, q2h, Sebastian Ash PA-C, Last Rate: 50 mL/hr at 08/27/24 0631, 20 mEq at 08/27/24 0631    propofol (Diprivan) infusion, 0-50 mcg/kg/min, intravenous, Continuous, Peña العلي MD, Stopped at 08/26/24 1139    [START ON 8/29/2024] thiamine (Vitamin B-1) tablet 100 mg, 100 mg, oral, Daily, Maddi Latif PA-C    thiamine (Vitamin B1) injection 100 mg, 100 mg, intravenous, Daily, Maddi Latif PA-C, 100 mg at 08/26/24 1618    Physical Exam:    Heart Rate:  []   Temp:  [36.6 °C (97.9 °F)-39.9 °C (103.8 °F)]   Resp:  [10-31]   BP: ()/()   Height:  [175.3 cm (5' 9.02\")]   Weight:  [86.8 kg (191 lb 5.8 oz)]   SpO2:  [80 %-100 %]     Physical Exam  HENT:      Head: Normocephalic and atraumatic.      Mouth/Throat:      Mouth: Mucous membranes are dry.   Eyes:      Pupils: Pupils are equal, round, and reactive to light.   Cardiovascular:      Rate and Rhythm: Regular rhythm. Tachycardia present.      Pulses: Normal pulses.      Heart sounds: Normal heart sounds.   Pulmonary:      Effort: Pulmonary effort is normal.      Breath sounds: Normal breath sounds.   Abdominal:      General: There is no distension.      Palpations: Abdomen is soft.      Tenderness: There is abdominal tenderness.   Musculoskeletal:         General: No swelling.   Skin:     General: Skin is warm.      Capillary Refill: Capillary refill takes less than 2 seconds.      Coloration: Skin is pale.   Neurological:      General: No focal deficit present.      Mental Status: He is alert.   Psychiatric:         Mood and Affect: Mood normal.        Review of Systems:  14 point review of systems was completed and negative except for those specially mention in my HPI    I have reviewed all medications, laboratory results, and imaging " pertinent for today's encounter.    Vent Mode: Pressure regulated volume control/assist control  S RR:  [16-22] 22  S VT:  [500 mL-550 mL] 550 mL  PEEP/CPAP (cm H2O):  [5 cm H20-8 cm H20] 8 cm H20  MAP (cm H2O):  [7.1-11] 11      Intake/Output Summary (Last 24 hours) at 8/27/2024 0737  Last data filed at 8/27/2024 0700  Gross per 24 hour   Intake 5406.99 ml   Output 1125 ml   Net 4281.99 ml            Assessment/Plan:    I am currently managing this critically ill patient for the following problems:    Neuro/Psych/Pain Ctrl/Sedation:  Alcohol Abuse  - Pain Management: tylenol  - CAM ICU  - CIWA without meds  - Folic acid, thiamine     Respiratory/ENT:  - Maintain SPO2 >92%  - Continuous pulse ox monitoring   - Pulm hygiene     Cardiovascular:  - Continuous cardiac monitoring per ICU protocol  - Maintain MAPS >65  - Daily EKGs     GI:  GI Bleed 2/2 possible esophageal varices  Cirrhotic liver   - Clear liquid diet  - BR with none  - PPI BID  - CTA : no active bleeding  - GI consulted  -- EGD yst 2 clips placed  -- hep panel  -- RUQ US  -- [facility]     Renal/Volume Status (Intra & Extravascular):  - Maintain urine output 0.5-1.0cc/kg/hr  - Monitor I/O's  - Replete electrolytes to maintain K >4.0 and Mg >2.0  - Daily BMP, Mg  - Cr: 0.5  - I/O: +4.4 L     Endocrine  No hx of DMII or thyroid concerns  - SSI q4hrs while NPO  - Monitor for hyper/hypoglycemia      Infectious Disease:  No fever or leukocytosis  - Monitor SIRS criteria  - WBC: 6 -> 4     Heme/Onc:  - Monitor for s/sx of anemia such as bleeding and bruising   - Transfuse if Hgb <7.0   - CBC q4h   - 2u PRBC, 1uFFP 1u plasma ordered  - Hgb: 10.2 -> 5.5 -> 7.8 ->   - PLT: 40 -> 29     MSK:  - Padded pressure points   - Ambulatory at baseline     Skin  - ICU skin protocol     Ethics/Code Status:  Full Code     :  DVT Prophylaxis: Holding GI bleed  GI Prophylaxis: PPI BID  Bowel Regimen: Clear liquid  Diet: NPO  CVC: None  [place]:  None  [place]: None  Restraints: None  Dispo: Admit to ICU    Critical Care Time:  60 minutes spent in preparing to see patient (I.e.labs,imaging, etc.), documentation, discussion plan of care with patient/family/caregiver, and/ or coordination of care with multidisciplinary team including the attending. Time does not include completion of procedure time.     Maddi Latif PA-C  Pulmonology & Critical Care Medicine   [facility]

## 2024-08-27 NOTE — PROGRESS NOTES
Spiritual Care Visit    Clinical Encounter Type  Visited With: Patient  Routine Visit: Introduction  Continue Visiting: Yes         Values/Beliefs  Spiritual Requests During Hospitalization: [place] only. No sacraments    Sacramental Encounters  Communion: Does not want communion  Communion Given Indicator: No  Sacrament of Sick-Anointing: Patient declined anointing     Gary Denis

## 2024-08-27 NOTE — PROGRESS NOTES
08/27/24 1517   Physical Activity   On average, how many days per week do you engage in moderate to strenuous exercise (like a brisk walk)? 0 days   On average, how many minutes do you engage in exercise at this level? 0 min   Financial Resource Strain   How hard is it for you to pay for the very basics like food, housing, medical care, and heating? Not very   Housing Stability   In the last 12 months, was there a time when you were not able to pay the mortgage or rent on time? N   In the past 12 months, how many times have you moved where you were living? 0   At any time in the past 12 months, were you homeless or living in a shelter (including now)? N   Transportation Needs   In the past 12 months, has lack of transportation kept you from medical appointments or from getting medications? no   In the past 12 months, has lack of transportation kept you from meetings, work, or from getting things needed for daily living? No   Food Insecurity   Within the past 12 months, you worried that your food would run out before you got the money to buy more. Never true   Within the past 12 months, the food you bought just didn't last and you didn't have money to get more. Never true   Stress   Do you feel stress - tense, restless, nervous, or anxious, or unable to sleep at night because your mind is troubled all the time - these days? To some exte   Social Connections   In a typical week, how many times do you talk on the phone with family, friends, or neighbors? Once a week   How often do you get together with friends or relatives? Never   How often do you attend Sikh or Spiritism services? Never   Do you belong to any clubs or organizations such as Sikh groups, unions, fraternal or athletic groups, or school groups? No   How often do you attend meetings of the clubs or organizations you belong to? Never   Are you , , , , never , or living with a partner?    Intimate Partner  Violence   Within the last year, have you been afraid of your partner or ex-partner? No   Within the last year, have you been humiliated or emotionally abused in other ways by your partner or ex-partner? No   Within the last year, have you been kicked, hit, slapped, or otherwise physically hurt by your partner or ex-partner? No   Within the last year, have you been raped or forced to have any kind of sexual activity by your partner or ex-partner? No   Alcohol Use   Q1: How often do you have a drink containing alcohol? 4 or more ti   Q2: How many drinks containing alcohol do you have on a typical day when you are drinking? 5 or 6   Q3: How often do you have six or more drinks on one occasion? Daily   Utilities   In the past 12 months has the electric, gas, oil, or water company threatened to shut off services in your home? No   Health Literacy   How often do you need to have someone help you when you read instructions, pamphlets, or other written material from your doctor or pharmacy? Never

## 2024-08-27 NOTE — ASSESSMENT & PLAN NOTE
Baseline Hgb ~15  S/p 2 units RBCs, 1 unit FFP, and 1 unit platelets  Hgb now stable around 8  Transfuse for Hgb <7

## 2024-08-27 NOTE — ASSESSMENT & PLAN NOTE
>10 episodes of watery diarrhea today  CT A/P showing evidence of colitis  Noted to have fever Tmax 103 on 8/26  Suspect patient likely has infectious diarrhea  Check C. Diff and stool pathogens  Given chronic alcohol use, would not be surprised if patient also has some component of pancreatic insufficiency, can consider trial of creon and/or loperamide if stool studies are negative for infection  Continue aggressive IVF until able to tolerate PO

## 2024-08-28 ENCOUNTER — APPOINTMENT (OUTPATIENT)
Dept: RADIOLOGY | Facility: HOSPITAL | Age: 69
DRG: 369 | End: 2024-08-28
Payer: MEDICARE

## 2024-08-28 LAB
ALBUMIN SERPL-MCNC: 2.8 G/DL (ref 3.5–5)
ALP BLD-CCNC: 49 U/L (ref 35–125)
ALT SERPL-CCNC: 73 U/L (ref 5–40)
ANION GAP SERPL CALC-SCNC: 9 MMOL/L
AST SERPL-CCNC: 142 U/L (ref 5–40)
BASOPHILS # BLD AUTO: 0.01 X10*3/UL (ref 0–0.1)
BASOPHILS NFR BLD AUTO: 0.3 %
BILIRUB SERPL-MCNC: 5.6 MG/DL (ref 0.1–1.2)
BUN SERPL-MCNC: 6 MG/DL (ref 8–25)
CALCIUM SERPL-MCNC: 7.8 MG/DL (ref 8.5–10.4)
CHLORIDE SERPL-SCNC: 102 MMOL/L (ref 97–107)
CO2 SERPL-SCNC: 24 MMOL/L (ref 24–31)
CREAT SERPL-MCNC: 0.6 MG/DL (ref 0.4–1.6)
EGFRCR SERPLBLD CKD-EPI 2021: >90 ML/MIN/1.73M*2
EOSINOPHIL # BLD AUTO: 0.2 X10*3/UL (ref 0–0.7)
EOSINOPHIL NFR BLD AUTO: 5.5 %
ERYTHROCYTE [DISTWIDTH] IN BLOOD BY AUTOMATED COUNT: 16.7 % (ref 11.5–14.5)
GLUCOSE BLD MANUAL STRIP-MCNC: 109 MG/DL (ref 74–99)
GLUCOSE BLD MANUAL STRIP-MCNC: 93 MG/DL (ref 74–99)
GLUCOSE BLD MANUAL STRIP-MCNC: 96 MG/DL (ref 74–99)
GLUCOSE SERPL-MCNC: 108 MG/DL (ref 65–99)
HAV IGM SER QL: NONREACTIVE
HBV CORE IGM SER QL: NONREACTIVE
HBV SURFACE AG SERPL QL IA: NONREACTIVE
HCT VFR BLD AUTO: 24.1 % (ref 41–52)
HCV AB SER QL: NONREACTIVE
HGB BLD-MCNC: 8.4 G/DL (ref 13.5–17.5)
IMM GRANULOCYTES # BLD AUTO: 0.01 X10*3/UL (ref 0–0.7)
IMM GRANULOCYTES NFR BLD AUTO: 0.3 % (ref 0–0.9)
LYMPHOCYTES # BLD AUTO: 1.25 X10*3/UL (ref 1.2–4.8)
LYMPHOCYTES NFR BLD AUTO: 34.6 %
MAGNESIUM SERPL-MCNC: 1.9 MG/DL (ref 1.6–3.1)
MCH RBC QN AUTO: 32.6 PG (ref 26–34)
MCHC RBC AUTO-ENTMCNC: 34.9 G/DL (ref 32–36)
MCV RBC AUTO: 93 FL (ref 80–100)
MONOCYTES # BLD AUTO: 0.49 X10*3/UL (ref 0.1–1)
MONOCYTES NFR BLD AUTO: 13.6 %
NEUTROPHILS # BLD AUTO: 1.65 X10*3/UL (ref 1.2–7.7)
NEUTROPHILS NFR BLD AUTO: 45.7 %
NRBC BLD-RTO: 0 /100 WBCS (ref 0–0)
PHOSPHATE SERPL-MCNC: 3.6 MG/DL (ref 2.5–4.5)
PLATELET # BLD AUTO: 32 X10*3/UL (ref 150–450)
POTASSIUM SERPL-SCNC: 3.7 MMOL/L (ref 3.4–5.1)
PROT SERPL-MCNC: 4.3 G/DL (ref 5.9–7.9)
RBC # BLD AUTO: 2.58 X10*6/UL (ref 4.5–5.9)
SODIUM SERPL-SCNC: 135 MMOL/L (ref 133–145)
WBC # BLD AUTO: 3.6 X10*3/UL (ref 4.4–11.3)

## 2024-08-28 PROCEDURE — 2020000001 HC ICU ROOM DAILY

## 2024-08-28 PROCEDURE — 2500000005 HC RX 250 GENERAL PHARMACY W/O HCPCS

## 2024-08-28 PROCEDURE — 76705 ECHO EXAM OF ABDOMEN: CPT

## 2024-08-28 PROCEDURE — 2500000004 HC RX 250 GENERAL PHARMACY W/ HCPCS (ALT 636 FOR OP/ED)

## 2024-08-28 PROCEDURE — 82947 ASSAY GLUCOSE BLOOD QUANT: CPT

## 2024-08-28 PROCEDURE — 80074 ACUTE HEPATITIS PANEL: CPT | Mod: WESLAB

## 2024-08-28 PROCEDURE — 99291 CRITICAL CARE FIRST HOUR: CPT | Performed by: NURSE PRACTITIONER

## 2024-08-28 PROCEDURE — 85025 COMPLETE CBC W/AUTO DIFF WBC: CPT

## 2024-08-28 PROCEDURE — 9420000001 HC RT PATIENT EDUCATION 5 MIN

## 2024-08-28 PROCEDURE — 94660 CPAP INITIATION&MGMT: CPT

## 2024-08-28 PROCEDURE — 84100 ASSAY OF PHOSPHORUS: CPT

## 2024-08-28 PROCEDURE — 76705 ECHO EXAM OF ABDOMEN: CPT | Performed by: RADIOLOGY

## 2024-08-28 PROCEDURE — 2500000001 HC RX 250 WO HCPCS SELF ADMINISTERED DRUGS (ALT 637 FOR MEDICARE OP)

## 2024-08-28 PROCEDURE — 83735 ASSAY OF MAGNESIUM: CPT

## 2024-08-28 PROCEDURE — 84075 ASSAY ALKALINE PHOSPHATASE: CPT

## 2024-08-28 RX ORDER — CARVEDILOL 3.12 MG/1
3.12 TABLET ORAL
Status: DISCONTINUED | OUTPATIENT
Start: 2024-08-28 | End: 2024-08-29

## 2024-08-28 RX ORDER — POTASSIUM CHLORIDE 14.9 MG/ML
20 INJECTION INTRAVENOUS ONCE
Status: COMPLETED | OUTPATIENT
Start: 2024-08-28 | End: 2024-08-28

## 2024-08-28 RX ORDER — MAGNESIUM SULFATE HEPTAHYDRATE 40 MG/ML
2 INJECTION, SOLUTION INTRAVENOUS ONCE
Status: COMPLETED | OUTPATIENT
Start: 2024-08-28 | End: 2024-08-28

## 2024-08-28 ASSESSMENT — LIFESTYLE VARIABLES
TREMOR: 3
AGITATION: NORMAL ACTIVITY
VISUAL DISTURBANCES: NOT PRESENT
TREMOR: NO TREMOR
PAROXYSMAL SWEATS: NO SWEAT VISIBLE
AUDITORY DISTURBANCES: NOT PRESENT
TOTAL SCORE: 3
PULSE: 88
TACTILE DISTURBANCES: VERY MILD ITCHING, PINS AND NEEDLES, BURNING OR NUMBNESS
PAROXYSMAL SWEATS: NO SWEAT VISIBLE
TOTAL SCORE: 2
HEADACHE, FULLNESS IN HEAD: NOT PRESENT
TOTAL SCORE: 5
HEADACHE, FULLNESS IN HEAD: NOT PRESENT
ANXIETY: MILDLY ANXIOUS
NAUSEA AND VOMITING: NO NAUSEA AND NO VOMITING
NAUSEA AND VOMITING: NO NAUSEA AND NO VOMITING
AGITATION: NORMAL ACTIVITY
TOTAL SCORE: 5
BLOOD PRESSURE: 76/52
ANXIETY: NO ANXIETY, AT EASE
AGITATION: NORMAL ACTIVITY
VISUAL DISTURBANCES: NOT PRESENT
BLOOD PRESSURE: 86/59
ANXIETY: MILDLY ANXIOUS
NAUSEA AND VOMITING: NO NAUSEA AND NO VOMITING
NAUSEA AND VOMITING: NO NAUSEA AND NO VOMITING
ORIENTATION AND CLOUDING OF SENSORIUM: ORIENTED AND CAN DO SERIAL ADDITIONS
HEADACHE, FULLNESS IN HEAD: NOT PRESENT
TREMOR: NO TREMOR
PULSE: 71
PULSE: 95
VISUAL DISTURBANCES: NOT PRESENT
HEADACHE, FULLNESS IN HEAD: NOT PRESENT
NAUSEA AND VOMITING: NO NAUSEA AND NO VOMITING
AGITATION: NORMAL ACTIVITY
NAUSEA AND VOMITING: NO NAUSEA AND NO VOMITING
BLOOD PRESSURE: 87/61
PAROXYSMAL SWEATS: NO SWEAT VISIBLE
AGITATION: NORMAL ACTIVITY
AGITATION: SOMEWHAT MORE THAN NORMAL ACTIVITY
TREMOR: NOT VISIBLE, BUT CAN BE FELT FINGERTIP TO FINGERTIP
ANXIETY: NO ANXIETY, AT EASE
TREMOR: 3
HEADACHE, FULLNESS IN HEAD: NOT PRESENT
TREMOR: NOT VISIBLE, BUT CAN BE FELT FINGERTIP TO FINGERTIP
HEADACHE, FULLNESS IN HEAD: NOT PRESENT
PAROXYSMAL SWEATS: NO SWEAT VISIBLE
NAUSEA AND VOMITING: NO NAUSEA AND NO VOMITING
TOTAL SCORE: 1
TOTAL SCORE: 3
ANXIETY: MILDLY ANXIOUS
PAROXYSMAL SWEATS: NO SWEAT VISIBLE
PAROXYSMAL SWEATS: NO SWEAT VISIBLE
ORIENTATION AND CLOUDING OF SENSORIUM: ORIENTED AND CAN DO SERIAL ADDITIONS
VISUAL DISTURBANCES: NOT PRESENT
BLOOD PRESSURE: 101/75
AUDITORY DISTURBANCES: NOT PRESENT
AGITATION: NORMAL ACTIVITY
AGITATION: SOMEWHAT MORE THAN NORMAL ACTIVITY
HEADACHE, FULLNESS IN HEAD: NOT PRESENT
ORIENTATION AND CLOUDING OF SENSORIUM: ORIENTED AND CAN DO SERIAL ADDITIONS
ANXIETY: MILDLY ANXIOUS
ANXIETY: NO ANXIETY, AT EASE
ANXIETY: NO ANXIETY, AT EASE
AUDITORY DISTURBANCES: NOT PRESENT
PAROXYSMAL SWEATS: NO SWEAT VISIBLE
PULSE: 86
ORIENTATION AND CLOUDING OF SENSORIUM: CANNOT DO SERIAL ADDITIONS OR IS UNCERTAIN ABOUT DATE
ORIENTATION AND CLOUDING OF SENSORIUM: CANNOT DO SERIAL ADDITIONS OR IS UNCERTAIN ABOUT DATE
VISUAL DISTURBANCES: NOT PRESENT
TREMOR: NOT VISIBLE, BUT CAN BE FELT FINGERTIP TO FINGERTIP
AUDITORY DISTURBANCES: NOT PRESENT
TOTAL SCORE: 2
AUDITORY DISTURBANCES: NOT PRESENT
ORIENTATION AND CLOUDING OF SENSORIUM: ORIENTED AND CAN DO SERIAL ADDITIONS
NAUSEA AND VOMITING: NO NAUSEA AND NO VOMITING
PULSE: 93
PAROXYSMAL SWEATS: NO SWEAT VISIBLE
TREMOR: MODERATE, WITH PATIENT'S ARMS EXTENDED
ORIENTATION AND CLOUDING OF SENSORIUM: ORIENTED AND CAN DO SERIAL ADDITIONS
HEADACHE, FULLNESS IN HEAD: NOT PRESENT
ORIENTATION AND CLOUDING OF SENSORIUM: CANNOT DO SERIAL ADDITIONS OR IS UNCERTAIN ABOUT DATE
AUDITORY DISTURBANCES: NOT PRESENT
TOTAL SCORE: 2
AUDITORY DISTURBANCES: NOT PRESENT
AUDITORY DISTURBANCES: NOT PRESENT
VISUAL DISTURBANCES: NOT PRESENT

## 2024-08-28 ASSESSMENT — PAIN - FUNCTIONAL ASSESSMENT
PAIN_FUNCTIONAL_ASSESSMENT: 0-10
PAIN_FUNCTIONAL_ASSESSMENT: CPOT (CRITICAL CARE PAIN OBSERVATION TOOL)

## 2024-08-28 ASSESSMENT — PAIN SCALES - GENERAL
PAINLEVEL_OUTOF10: 0 - NO PAIN

## 2024-08-28 ASSESSMENT — PAIN SCALES - WONG BAKER: WONGBAKER_NUMERICALRESPONSE: NO HURT

## 2024-08-28 NOTE — CARE PLAN
Problem: Fall/Injury  Goal: Not fall by end of shift  Outcome: Progressing  Goal: Be free from injury by end of the shift  Outcome: Progressing  Goal: Verbalize understanding of personal risk factors for fall in the hospital  Outcome: Progressing  Goal: Verbalize understanding of risk factor reduction measures to prevent injury from fall in the home  Outcome: Progressing  Goal: Use assistive devices by end of the shift  Outcome: Progressing  Goal: Pace activities to prevent fatigue by end of the shift  Outcome: Progressing     Problem: Pain  Goal: Takes deep breaths with improved pain control throughout the shift  Outcome: Progressing  Goal: Turns in bed with improved pain control throughout the shift  Outcome: Progressing  Goal: Walks with improved pain control throughout the shift  Outcome: Progressing  Goal: Performs ADL's with improved pain control throughout shift  Outcome: Progressing  Goal: Participates in PT with improved pain control throughout the shift  Outcome: Progressing  Goal: Free from opioid side effects throughout the shift  Outcome: Progressing  Goal: Free from acute confusion related to pain meds throughout the shift  Outcome: Progressing     Problem: Skin  Goal: Participates in plan/prevention/treatment measures  Outcome: Progressing  Goal: Prevent/manage excess moisture  Outcome: Progressing  Goal: Prevent/minimize sheer/friction injuries  Outcome: Progressing  Goal: Promote/optimize nutrition  Outcome: Progressing  Goal: Promote skin healing  Outcome: Progressing     Problem: Safety - Adult  Goal: Free from fall injury  Outcome: Progressing     Problem: Discharge Planning  Goal: Discharge to home or other facility with appropriate resources  Outcome: Progressing     Problem: Chronic Conditions and Co-morbidities  Goal: Patient's chronic conditions and co-morbidity symptoms are monitored and maintained or improved  Outcome: Progressing     Problem: Knowledge Deficit  Goal:  Patient/family/caregiver demonstrates understanding of disease process, treatment plan, medications, and discharge instructions  Outcome: Progressing     Problem: Mechanical Ventilation  Goal: Patient Will Maintain Patent Airway  Outcome: Progressing  Goal: Oral health is maintained or improved  Outcome: Progressing  Goal: Ability to express needs and understand communication  Outcome: Progressing  Goal: Mobility/activity is maintained at optimum level for patient  Outcome: Progressing           The patient's goals for the shift include feel better    The clinical goals for the shift include Monitor withdrawal symptoms

## 2024-08-28 NOTE — CARE PLAN
The patient's goals for the shift include feel better    The clinical goals for the shift include Control withdrawal symptoms; maintain hemodynamic stability      Problem: Fall/Injury  Goal: Not fall by end of shift  Outcome: Progressing  Goal: Be free from injury by end of the shift  Outcome: Progressing  Goal: Verbalize understanding of personal risk factors for fall in the hospital  Outcome: Progressing  Goal: Verbalize understanding of risk factor reduction measures to prevent injury from fall in the home  Outcome: Progressing  Goal: Use assistive devices by end of the shift  Outcome: Progressing  Goal: Pace activities to prevent fatigue by end of the shift  Outcome: Progressing     Problem: Pain  Goal: Takes deep breaths with improved pain control throughout the shift  Outcome: Progressing  Goal: Turns in bed with improved pain control throughout the shift  Outcome: Progressing  Goal: Walks with improved pain control throughout the shift  Outcome: Progressing  Goal: Performs ADL's with improved pain control throughout shift  Outcome: Progressing  Goal: Participates in PT with improved pain control throughout the shift  Outcome: Progressing  Goal: Free from opioid side effects throughout the shift  Outcome: Progressing  Goal: Free from acute confusion related to pain meds throughout the shift  Outcome: Progressing     Problem: Skin  Goal: Participates in plan/prevention/treatment measures  Outcome: Progressing  Flowsheets (Taken 8/27/2024 2224)  Participates in plan/prevention/treatment measures:   Discuss with provider PT/OT consult   Elevate heels   Increase activity/out of bed for meals  Goal: Prevent/manage excess moisture  Outcome: Progressing  Flowsheets (Taken 8/27/2024 2224)  Prevent/manage excess moisture:   Cleanse incontinence/protect with barrier cream   Moisturize dry skin   Follow provider orders for dressing changes   Monitor for/manage infection if present  Goal: Prevent/minimize sheer/friction  injuries  Outcome: Progressing  Flowsheets (Taken 8/27/2024 2224)  Prevent/minimize sheer/friction injuries:   Complete micro-shifts as needed if patient unable. Adjust patient position to relieve pressure points, not a full turn   Increase activity/out of bed for meals   Use pull sheet   HOB 30 degrees or less   Turn/reposition every 2 hours/use positioning/transfer devices   Utilize specialty bed per algorithm  Goal: Promote/optimize nutrition  Outcome: Progressing  Flowsheets (Taken 8/27/2024 2224)  Promote/optimize nutrition:   Offer water/supplements/favorite foods   Monitor/record intake including meals  Goal: Promote skin healing  Outcome: Progressing  Flowsheets (Taken 8/27/2024 2224)  Promote skin healing:   Assess skin/pad under line(s)/device(s)   Protective dressings over bony prominences   Turn/reposition every 2 hours/use positioning/transfer devices   Ensure correct size (line/device) and apply per  instructions   Rotate device position/do not position patient on device     Problem: Safety - Adult  Goal: Free from fall injury  Outcome: Progressing     Problem: Discharge Planning  Goal: Discharge to home or other facility with appropriate resources  Outcome: Progressing     Problem: Chronic Conditions and Co-morbidities  Goal: Patient's chronic conditions and co-morbidity symptoms are monitored and maintained or improved  Outcome: Progressing

## 2024-08-28 NOTE — PROGRESS NOTES
Critical Care Medicine       Date:  8/28/2024  Patient:  Israel Parikh  YOB: 1955  MRN:  [id number]   Admit Date:  8/26/2024      Chief Complaint   Patient presents with    Weakness, Gen    Vomiting Blood         History of Present Illness:  Israel Parikh is a 68 y.o. year old male patient with past medical history that he is not aware of. He states that he has not taking any medications in the past 6 months. He presented to the ER for GI bleed. Patient states he has had nausea and vomiting for the past several days. He notes blood within the vomit, described as dark coffee grounds. Patient also notes multiple episodes of diarrhea, noted to have stool in his pants. [facility] states [facility] and upon returning home has been too weak to purchase groceries and therefore has not eaten anything in the past several days. Denies fevers. No abdominal pain. No prior history of gastric ulcers. States that he drinks about 1-2 L of whiskey per day, but when he does drink he drinks at least 1 pint. Last drink was 2 days ago      Patient originally admitted to step down but had multiple large bloody BM in the ER. Hgb was 10 when baseline is 14-15. Patient is hemodynamically stable but Hgb dropped from 10 -> 5 after blood BM. Will admit to ICU.      Interval ICU Events:  8/26: Patients vitals remain stable. Hgb: 5.5. 2u PRBCs, 1u PLT, and 1u of FFP that we will hang when it is ready. GI was consulted and will do a scope at bedside. We will cont to monitor hemodynamics and intiate MTP if indicated.    8/27: Patient had EGD yesterday with 2 clips placed. Patient BM starting to become less bloody. Hgb stable. 3 BM today non bloody. Plan to transfer to the floor today.     8/28: Returned to the ICU in setting of DT's.  He was given phenobarb 260mg IV  x 1 and started on phenobarb 97.5mg IV Q 8 (with taper).  He was placed on NIPPV overnight for h/o PAULA.      Objective     Past  Medical History:   Diagnosis Date    HTN (hypertension)      Past Surgical History:   Procedure Laterality Date    ANKLE SURGERY      CT ANGIO NECK  03/07/2023    CT NECK ANGIO W AND WO IV CONTRAST [facility] CT    NECK SURGERY       No medications prior to admission.     Patient has no known allergies.  Social History     Tobacco Use    Smoking status: Former     Types: Cigarettes     Passive exposure: Never    Smokeless tobacco: Never   Substance Use Topics    Alcohol use: Yes     Comment: rarely    Drug use: Never     No family history on file.    Hospital Medications:    lactated Ringer's, 200 mL/hr, Last Rate: 200 mL/hr (08/28/24 0700)          Current Facility-Administered Medications:     dextrose 50 % injection 12.5 g, 12.5 g, intravenous, q15 min PRN, Maddi Latif PA-C    dextrose 50 % injection 25 g, 25 g, intravenous, q15 min PRN, Maddi Latif PA-C    dicyclomine (Bentyl) capsule 10 mg, 10 mg, oral, 4x daily, Maddi Latif PA-C, 10 mg at 08/27/24 1712    pantoprazole (ProtoNix) EC tablet 40 mg, 40 mg, oral, BID, 40 mg at 08/27/24 0835 **OR** esomeprazole (NexIUM) suspension 40 mg, 40 mg, nasoduodenal tube, BID **OR** pantoprazole (ProtoNix) injection 40 mg, 40 mg, intravenous, BID, Maddi Latif PA-C, 40 mg at 08/27/24 2013    folic acid (Folvite) tablet 1 mg, 1 mg, oral, Daily, Maddi Latif PA-C, 1 mg at 08/27/24 0836    glucagon (Glucagen) injection 1 mg, 1 mg, intramuscular, q15 min PRN, Maddi Latif PA-C    glucagon (Glucagen) injection 1 mg, 1 mg, intramuscular, q15 min PRN, Maddi Latif PA-C    lactated Ringer's infusion, 200 mL/hr, intravenous, Continuous, Maddi Latif PA-C, Last Rate: 200 mL/hr at 08/28/24 0700, 200 mL/hr at 08/28/24 0700    metoclopramide (Reglan) injection 5 mg, 5 mg, intravenous, q6h TYREL, Maddi Latif PA-C, 5 mg at 08/28/24 0531    multivitamin with minerals 1 tablet, 1 tablet, oral, Daily, Maddi Latif PA-C, 1 tablet at 08/27/24 0835    ondansetron ODT  (Zofran-ODT) disintegrating tablet 4 mg, 4 mg, oral, q8h PRN, 4 mg at 08/27/24 0928 **OR** ondansetron (Zofran) injection 4 mg, 4 mg, intravenous, q8h PRN, Maddi Latif PA-C, 4 mg at 08/26/24 1455    oxygen (O2) therapy, , inhalation, Continuous - Inhalation, Maddi Latif PA-C, 28 percent at 08/28/24 0800    oxygen (O2) therapy, , inhalation, Continuous - Inhalation, Sebastian Ash PA-C, 28 percent at 08/28/24 0749    perflutren lipid microspheres (Definity) injection 0.5-10 mL of dilution, 0.5-10 mL of dilution, intravenous, Once in imaging, Ming Frazier PA-C    perflutren protein A microsphere (Optison) injection 0.5 mL, 0.5 mL, intravenous, Once in imaging, Ming Frazier PA-C    PHENobarbital (Luminal) injection 130 mg, 130 mg, intravenous, q15 min PRN, Sebastian Ash PA-C    PHENobarbital (Luminal) injection 97.5 mg, 97.5 mg, intravenous, q8h, 97.5 mg at 08/28/24 0426 **FOLLOWED BY** [START ON 8/29/2024] PHENobarbital (Luminal) injection 65 mg, 65 mg, intravenous, q8h **FOLLOWED BY** [START ON 8/31/2024] PHENobarbital (Luminal) injection 32.5 mg, 32.5 mg, intravenous, q8h, Sebastian Ash PA-C    sulfur hexafluoride microsphr (Lumason) injection 24.28 mg, 2 mL, intravenous, Once in imaging, Ming Frazier PA-C    [START ON 8/29/2024] thiamine (Vitamin B-1) tablet 100 mg, 100 mg, oral, Daily, Maddi Latif PA-C    thiamine (Vitamin B1) injection 100 mg, 100 mg, intravenous, Daily, Maddi Latif PA-C, 100 mg at 08/27/24 0836    Physical Exam:    Heart Rate:  []   Temp:  [36.4 °C (97.5 °F)-37.2 °C (99 °F)]   Resp:  [10-36]   BP: ()/(48-88)   Weight:  [92.1 kg (203 lb 0.7 oz)]   SpO2:  [92 %-100 %]     Physical Exam  HENT:      Head: Normocephalic and atraumatic.      Mouth/Throat:      Mouth: Mucous membranes are dry.   Eyes:      Pupils: Pupils are equal, round, and reactive to light.   Cardiovascular:      Rate and Rhythm: Regular rhythm. Tachycardia present.      Pulses: Normal pulses.       Heart sounds: Normal heart sounds.   Pulmonary:      Effort: Pulmonary effort is normal.      Breath sounds: Normal breath sounds.   Abdominal:      General: There is no distension.      Palpations: Abdomen is soft.      Tenderness: There is abdominal tenderness.   Musculoskeletal:         General: No swelling.   Skin:     General: Skin is warm.      Capillary Refill: Capillary refill takes less than 2 seconds.      Coloration: Skin is pale.   Neurological:      General: No focal deficit present.      Mental Status: He is alert.   Psychiatric:         Mood and Affect: Mood normal.        Review of Systems:  14 point review of systems was completed and negative except for those specially mention in my HPI    I have reviewed all medications, laboratory results, and imaging pertinent for today's encounter.    FiO2 (%):  [28 %-36 %] 28 %      Intake/Output Summary (Last 24 hours) at 8/28/2024 0839  Last data filed at 8/28/2024 0811  Gross per 24 hour   Intake 9803.05 ml   Output 450 ml   Net 9353.05 ml            Assessment/Plan:    I am currently managing this critically ill patient for the following problems:    Neuro/Psych/Pain Ctrl/Sedation:  Alcohol Abuse, delirium tremens   - Pain Management: tylenol  - CAM ICU  - CIWA with scheduled phenobarbital taper and as needed per CIWA score     - Folic acid, thiamine     Respiratory/ENT:  - Maintain SPO2 >92%  - Continuous pulse ox monitoring   - CPAP while asleep for PAULA   - Pulm hygiene     Cardiovascular:  - Continuous cardiac monitoring per ICU protocol  - Maintain MAPS >65  - Daily EKGs     GI:  GI Bleed 2/2 possible esophageal varices; EGD 8/26 with 2 clips to MWT  Cirrhotic liver with mild transaminitis   - Clear liquid diet  - PPI BID  -- hep panel  -- RUQ US today   -- [facility]     Renal/Volume Status (Intra & Extravascular):  No acute problems, trending towards volume overload    - Monitor I/O's  - I/O balance not accounting for GI losses  though he has trace edema   - Replete electrolytes to maintain K >4.0 and Mg >2.0  - Daily BMP, Mg    Endocrine  No hx of DMII or thyroid concerns  - Monitor for hyper/hypoglycemia      Infectious Disease:  Afebrile, leukopenia   - Monitor for SIRS   - Hepatitis panel   -    Heme/Onc:  Acute blood loss anemia 2/2 GIB, stable.  Thrombocytopenia 2/2 blood loss and etoh suppression    - Monitor for s/sx of recurrent bleeding   - Transfuse if Hgb <7.0   - Defer PLT transfusion pending active bleeding   - CBC Q12      MSK:  - Padded pressure points   - Ambulatory at baseline     Skin  - ICU skin protocol     Ethics/Code Status:  Full Code     :  DVT Prophylaxis: Holding GI bleed  GI Prophylaxis: PPI BID  Bowel Regimen: Clear liquid  Diet: NPO  CVC: None  Bijal: None  Louis: None  Restraints: None  Dispo: ICU pending stable CIWA management     Critical Care Time:  35 minutes spent in preparing to see patient (I.e.labs,imaging, etc.), documentation, discussion plan of care with patient/family/caregiver, and/ or coordination of care with multidisciplinary team including the attending. Time does not include completion of procedure time.

## 2024-08-28 NOTE — PROGRESS NOTES
"Israel Parikh is a 68 y.o. male on day 2 of admission presenting with Gastrointestinal hemorrhage with hematemesis.    Subjective   Per nursing diarrhea has improved, no blood noted in stool.       Objective     Physical Exam  HENT:      Head: Normocephalic and atraumatic.      Mouth/Throat:      Mouth: Mucous membranes are dry.   Eyes:      Pupils: Pupils are equal, round, and reactive to light.   Cardiovascular:      Rate and Rhythm: Regular rhythm. Tachycardia present.      Pulses: Normal pulses.      Heart sounds: Normal heart sounds.   Pulmonary:      Effort: Pulmonary effort is normal.      Breath sounds: Normal breath sounds.   Abdominal:      General: There is no distension.      Palpations: Abdomen is soft.      Tenderness: There is abdominal tenderness.   Musculoskeletal:         General: No swelling.   Skin:     General: Skin is warm.      Capillary Refill: Capillary refill takes less than 2 seconds.      Coloration: Skin is pale.   Neurological:      General: No focal deficit present.      Mental Status: He is alert.   Psychiatric:         Mood and Affect: Mood normal.        Last Recorded Vitals  Blood pressure 103/79, pulse 94, temperature 37.2 °C (98.9 °F), resp. rate 23, height 1.753 m (5' 9.02\"), weight 92.1 kg (203 lb 0.7 oz), SpO2 96%.  Intake/Output last 3 Shifts:  I/O last 3 completed shifts:  In: 78578.4 (119.6 mL/kg) [P.O.:150; I.V.:7353.4 (79.8 mL/kg); IV Piggyback:3513]  Out: 800 (8.7 mL/kg) [Urine:800 (0.2 mL/kg/hr)]  Weight: 92.1 kg     Relevant Results      Scheduled medications  dicyclomine, 10 mg, oral, 4x daily  pantoprazole, 40 mg, oral, BID   Or  esomeprazole, 40 mg, nasoduodenal tube, BID   Or  pantoprazole, 40 mg, intravenous, BID  folic acid, 1 mg, oral, Daily  metoclopramide, 5 mg, intravenous, q6h TYREL  multivitamin with minerals, 1 tablet, oral, Daily  oxygen, , inhalation, Continuous - Inhalation  oxygen, , inhalation, Continuous - Inhalation  perflutren lipid microspheres, " 0.5-10 mL of dilution, intravenous, Once in imaging  perflutren protein A microsphere, 0.5 mL, intravenous, Once in imaging  PHENobarbital, 97.5 mg, intravenous, q8h   Followed by  [START ON 8/29/2024] PHENobarbital, 65 mg, intravenous, q8h   Followed by  [START ON 8/31/2024] PHENobarbital, 32.5 mg, intravenous, q8h  sulfur hexafluoride microsphr, 2 mL, intravenous, Once in imaging  [START ON 8/29/2024] thiamine, 100 mg, oral, Daily      Continuous medications  lactated Ringer's, 200 mL/hr, Last Rate: 200 mL/hr (08/28/24 0700)      PRN medications  PRN medications: dextrose, dextrose, glucagon, glucagon, ondansetron ODT **OR** ondansetron, PHENobarbital  Results for orders placed or performed during the hospital encounter of 08/26/24 (from the past 24 hour(s))   POCT GLUCOSE   Result Value Ref Range    POCT Glucose 92 74 - 99 mg/dL   CBC and Auto Differential   Result Value Ref Range    WBC 4.4 4.4 - 11.3 x10*3/uL    nRBC 0.0 0.0 - 0.0 /100 WBCs    RBC 2.51 (L) 4.50 - 5.90 x10*6/uL    Hemoglobin 7.9 (L) 13.5 - 17.5 g/dL    Hematocrit 23.2 (L) 41.0 - 52.0 %    MCV 92 80 - 100 fL    MCH 31.5 26.0 - 34.0 pg    MCHC 34.1 32.0 - 36.0 g/dL    RDW 16.8 (H) 11.5 - 14.5 %    Platelets 30 (LL) 150 - 450 x10*3/uL    Neutrophils % 52.0 40.0 - 80.0 %    Immature Granulocytes %, Automated 0.5 0.0 - 0.9 %    Lymphocytes % 29.3 13.0 - 44.0 %    Monocytes % 14.5 2.0 - 10.0 %    Eosinophils % 3.2 0.0 - 6.0 %    Basophils % 0.5 0.0 - 2.0 %    Neutrophils Absolute 2.30 1.20 - 7.70 x10*3/uL    Immature Granulocytes Absolute, Automated 0.02 0.00 - 0.70 x10*3/uL    Lymphocytes Absolute 1.29 1.20 - 4.80 x10*3/uL    Monocytes Absolute 0.64 0.10 - 1.00 x10*3/uL    Eosinophils Absolute 0.14 0.00 - 0.70 x10*3/uL    Basophils Absolute 0.02 0.00 - 0.10 x10*3/uL   Basic Metabolic Panel   Result Value Ref Range    Glucose 94 65 - 99 mg/dL    Sodium 134 133 - 145 mmol/L    Potassium 3.8 3.4 - 5.1 mmol/L    Chloride 101 97 - 107 mmol/L     Bicarbonate 23 (L) 24 - 31 mmol/L    Urea Nitrogen 9 8 - 25 mg/dL    Creatinine 0.50 0.40 - 1.60 mg/dL    eGFR >90 >60 mL/min/1.73m*2    Calcium 7.6 (L) 8.5 - 10.4 mg/dL    Anion Gap 10 <=19 mmol/L   Magnesium   Result Value Ref Range    Magnesium 2.10 1.60 - 3.10 mg/dL   Phosphorus   Result Value Ref Range    Phosphorus 1.9 (L) 2.5 - 4.5 mg/dL   Morphology   Result Value Ref Range    RBC Morphology See Below     Polychromasia Mild     RBC Fragments Few     Ovalocytes Few     Ju Cells Many    Protime-INR   Result Value Ref Range    Protime 17.2 (H) 9.3 - 12.7 seconds    INR 1.7 (H) 0.9 - 1.2   POCT GLUCOSE   Result Value Ref Range    POCT Glucose 93 74 - 99 mg/dL   POCT GLUCOSE   Result Value Ref Range    POCT Glucose 108 (H) 74 - 99 mg/dL   POCT GLUCOSE   Result Value Ref Range    POCT Glucose 109 (H) 74 - 99 mg/dL   CBC and Auto Differential   Result Value Ref Range    WBC 3.6 (L) 4.4 - 11.3 x10*3/uL    nRBC 0.0 0.0 - 0.0 /100 WBCs    RBC 2.58 (L) 4.50 - 5.90 x10*6/uL    Hemoglobin 8.4 (L) 13.5 - 17.5 g/dL    Hematocrit 24.1 (L) 41.0 - 52.0 %    MCV 93 80 - 100 fL    MCH 32.6 26.0 - 34.0 pg    MCHC 34.9 32.0 - 36.0 g/dL    RDW 16.7 (H) 11.5 - 14.5 %    Platelets 32 (LL) 150 - 450 x10*3/uL    Neutrophils % 45.7 40.0 - 80.0 %    Immature Granulocytes %, Automated 0.3 0.0 - 0.9 %    Lymphocytes % 34.6 13.0 - 44.0 %    Monocytes % 13.6 2.0 - 10.0 %    Eosinophils % 5.5 0.0 - 6.0 %    Basophils % 0.3 0.0 - 2.0 %    Neutrophils Absolute 1.65 1.20 - 7.70 x10*3/uL    Immature Granulocytes Absolute, Automated 0.01 0.00 - 0.70 x10*3/uL    Lymphocytes Absolute 1.25 1.20 - 4.80 x10*3/uL    Monocytes Absolute 0.49 0.10 - 1.00 x10*3/uL    Eosinophils Absolute 0.20 0.00 - 0.70 x10*3/uL    Basophils Absolute 0.01 0.00 - 0.10 x10*3/uL   Comprehensive Metabolic Panel   Result Value Ref Range    Glucose 108 (H) 65 - 99 mg/dL    Sodium 135 133 - 145 mmol/L    Potassium 3.7 3.4 - 5.1 mmol/L    Chloride 102 97 - 107 mmol/L     "Bicarbonate 24 24 - 31 mmol/L    Urea Nitrogen 6 (L) 8 - 25 mg/dL    Creatinine 0.60 0.40 - 1.60 mg/dL    eGFR >90 >60 mL/min/1.73m*2    Calcium 7.8 (L) 8.5 - 10.4 mg/dL    Albumin 2.8 (L) 3.5 - 5.0 g/dL    Alkaline Phosphatase 49 35 - 125 U/L    Total Protein 4.3 (L) 5.9 - 7.9 g/dL     (H) 5 - 40 U/L    Bilirubin, Total 5.6 (H) 0.1 - 1.2 mg/dL    ALT 73 (H) 5 - 40 U/L    Anion Gap 9 <=19 mmol/L   Magnesium   Result Value Ref Range    Magnesium 1.90 1.60 - 3.10 mg/dL   Phosphorus   Result Value Ref Range    Phosphorus 3.6 2.5 - 4.5 mg/dL   POCT GLUCOSE   Result Value Ref Range    POCT Glucose 93 74 - 99 mg/dL                            Assessment/Plan   Assessment & Plan  Gastrointestinal hemorrhage with hematemesis    Acute blood loss anemia    Alcohol use with withdrawal (Multi)    Alcoholic cirrhosis of liver with ascites (Multi)    Diarrhea of presumed infectious origin    Thrombocytopenia (CMS-HCC)    GI Bleed, Anemia  EGD per Dr Davidson 8/26 showing MWT. 2 clips placed. Patient reports sudden onset of vomiting and bloody stools at same time as \"these hiccups\" CTA without source of bleeding. There was some question of ascending colon thickening. Seems less likely lower GI source. Possible this was related to MWT with his low Plts and raised INR . Hgb stable at 8.4. No further bleeding noted.               -Continue PPI BID               -Clear liquid diet               -Add scheduled Reglan and Bentyl      Elevated LFTs, Thrombocytopenia, Abnormal CT               -RUQ US pending               -Acute hep panel pending              -INR 1.7              -Likely new cirrhosis in setting chronic ETOH abuse. [facility]        Wendi Metz, APRN-CNP      "

## 2024-08-28 NOTE — PROGRESS NOTES
Physical Therapy                 Therapy Communication Note    Patient Name: Israel Parikh  MRN: [id number]  Today's Date: 8/28/2024     Discipline: Physical Therapy    Missed Visit Reason:  Cancel PT Evaluation - attempted multiple times; pt lethargic, sleepy and unarousable today. RN requested to try back tomorrow.)

## 2024-08-28 NOTE — PROGRESS NOTES
Occupational Therapy                 Therapy Communication Note    Patient Name: Israel Parikh  MRN: [id number]  Today's Date: 8/28/2024     Discipline: Occupational Therapy    Missed Visit Reason: Missed Visit Reason: Cancel (Per nurse patient still very lethargic, not appropriate for OT evaluation this date.)    Missed Time: Cancel    Comment:

## 2024-08-28 NOTE — PROGRESS NOTES
Patient not medically clear. Patient moved back to ICU secondary to CIWA score. Patient lethargic. Yesterday, [facility] but because of lethargy unable to complete today. Will continue to follow.      08/28/24 1341   Discharge Planning   Home or Post Acute Services Other (Comment)  (TBD)   Expected Discharge Disposition Other  (TBD)   Does the patient need discharge transport arranged? Yes   RoundTrip coordination needed? Yes        General:     Awake and active;   Head:		AFOF  Eyes:		Normally set bilaterally  Ears:		Patent bilaterally, no deformities  Nose/Mouth:	Nares patent, palate intact  Neck:		No masses, intact clavicles  Chest/Lungs:      Breath sounds equal to auscultation. No retractions  CV:		No murmurs appreciated, normal pulses bilaterally  Abdomen:          Soft nontender nondistended, no masses, bowel sounds present  :		Normal for gestational age  Back:		Intact skin, no sacral dimples or tags  Anus:		Grossly patent  Extremities:	FROM, no hip clicks  Skin:		Pink, no lesions  Neuro exam:	Appropriate tone, activity   General:     Awake and active;   Head:		AFOF  Eyes:		Normally set bilaterally  Ears:		Patent bilaterally, no deformities  Nose/Mouth:	Nares patent, palate intact  Neck:		No masses, intact clavicles  Chest/Lungs:      Breath sounds equal to auscultation. No retractions  CV:		++ continuos  murmurs appreciated, normal pulses bilaterally  Abdomen:          Soft nontender nondistended, no masses, bowel sounds present  :		Normal for gestational age  Back:		Intact skin, no sacral dimples or tags  Anus:		Grossly patent  Extremities:	FROM, no hip clicks  Skin:		Pink, no lesions  Neuro exam:	Appropriate tone, activity

## 2024-08-28 NOTE — NURSING NOTE
Head to toe assessment completed. No skin breakdown observed. Pt able to turn himself. Pt has external cath on pericare provided frequently.

## 2024-08-29 LAB
ALBUMIN SERPL-MCNC: 2.7 G/DL (ref 3.5–5)
ALP BLD-CCNC: 49 U/L (ref 35–125)
ALT SERPL-CCNC: 87 U/L (ref 5–40)
ANION GAP SERPL CALC-SCNC: 10 MMOL/L
AST SERPL-CCNC: 159 U/L (ref 5–40)
BASOPHILS # BLD AUTO: 0.01 X10*3/UL (ref 0–0.1)
BASOPHILS NFR BLD AUTO: 0.3 %
BILIRUB SERPL-MCNC: 5.1 MG/DL (ref 0.1–1.2)
BUN SERPL-MCNC: 7 MG/DL (ref 8–25)
BURR CELLS BLD QL SMEAR: NORMAL
CALCIUM SERPL-MCNC: 7.8 MG/DL (ref 8.5–10.4)
CHLORIDE SERPL-SCNC: 102 MMOL/L (ref 97–107)
CO2 SERPL-SCNC: 23 MMOL/L (ref 24–31)
CREAT SERPL-MCNC: 0.7 MG/DL (ref 0.4–1.6)
EGFRCR SERPLBLD CKD-EPI 2021: >90 ML/MIN/1.73M*2
EOSINOPHIL # BLD AUTO: 0.18 X10*3/UL (ref 0–0.7)
EOSINOPHIL NFR BLD AUTO: 4.6 %
ERYTHROCYTE [DISTWIDTH] IN BLOOD BY AUTOMATED COUNT: 17.2 % (ref 11.5–14.5)
GLUCOSE SERPL-MCNC: 94 MG/DL (ref 65–99)
HCT VFR BLD AUTO: 25.6 % (ref 41–52)
HGB BLD-MCNC: 8.8 G/DL (ref 13.5–17.5)
IMM GRANULOCYTES # BLD AUTO: 0.02 X10*3/UL (ref 0–0.7)
IMM GRANULOCYTES NFR BLD AUTO: 0.5 % (ref 0–0.9)
LYMPHOCYTES # BLD AUTO: 1.26 X10*3/UL (ref 1.2–4.8)
LYMPHOCYTES NFR BLD AUTO: 31.9 %
MAGNESIUM SERPL-MCNC: 2.1 MG/DL (ref 1.6–3.1)
MCH RBC QN AUTO: 32.7 PG (ref 26–34)
MCHC RBC AUTO-ENTMCNC: 34.4 G/DL (ref 32–36)
MCV RBC AUTO: 95 FL (ref 80–100)
MONOCYTES # BLD AUTO: 0.6 X10*3/UL (ref 0.1–1)
MONOCYTES NFR BLD AUTO: 15.2 %
NEUTROPHILS # BLD AUTO: 1.88 X10*3/UL (ref 1.2–7.7)
NEUTROPHILS NFR BLD AUTO: 47.5 %
NRBC BLD-RTO: 0 /100 WBCS (ref 0–0)
OVALOCYTES BLD QL SMEAR: NORMAL
PHOSPHATE SERPL-MCNC: 3.1 MG/DL (ref 2.5–4.5)
PLATELET # BLD AUTO: 43 X10*3/UL (ref 150–450)
POLYCHROMASIA BLD QL SMEAR: NORMAL
POTASSIUM SERPL-SCNC: 3.9 MMOL/L (ref 3.4–5.1)
PROT SERPL-MCNC: 4.1 G/DL (ref 5.9–7.9)
RBC # BLD AUTO: 2.69 X10*6/UL (ref 4.5–5.9)
RBC MORPH BLD: NORMAL
SCHISTOCYTES BLD QL SMEAR: NORMAL
SODIUM SERPL-SCNC: 135 MMOL/L (ref 133–145)
WBC # BLD AUTO: 4 X10*3/UL (ref 4.4–11.3)

## 2024-08-29 PROCEDURE — 94660 CPAP INITIATION&MGMT: CPT

## 2024-08-29 PROCEDURE — 85025 COMPLETE CBC W/AUTO DIFF WBC: CPT

## 2024-08-29 PROCEDURE — 97166 OT EVAL MOD COMPLEX 45 MIN: CPT | Mod: GO

## 2024-08-29 PROCEDURE — 2500000005 HC RX 250 GENERAL PHARMACY W/O HCPCS

## 2024-08-29 PROCEDURE — 2020000001 HC ICU ROOM DAILY

## 2024-08-29 PROCEDURE — 2500000004 HC RX 250 GENERAL PHARMACY W/ HCPCS (ALT 636 FOR OP/ED)

## 2024-08-29 PROCEDURE — 97162 PT EVAL MOD COMPLEX 30 MIN: CPT | Mod: GP

## 2024-08-29 PROCEDURE — 2500000001 HC RX 250 WO HCPCS SELF ADMINISTERED DRUGS (ALT 637 FOR MEDICARE OP)

## 2024-08-29 PROCEDURE — 80053 COMPREHEN METABOLIC PANEL: CPT

## 2024-08-29 PROCEDURE — 37799 UNLISTED PX VASCULAR SURGERY: CPT

## 2024-08-29 PROCEDURE — 2500000005 HC RX 250 GENERAL PHARMACY W/O HCPCS: Performed by: ANESTHESIOLOGY

## 2024-08-29 PROCEDURE — 97535 SELF CARE MNGMENT TRAINING: CPT | Mod: GO

## 2024-08-29 PROCEDURE — 84100 ASSAY OF PHOSPHORUS: CPT

## 2024-08-29 PROCEDURE — 97530 THERAPEUTIC ACTIVITIES: CPT | Mod: GP

## 2024-08-29 PROCEDURE — 83735 ASSAY OF MAGNESIUM: CPT

## 2024-08-29 PROCEDURE — 99291 CRITICAL CARE FIRST HOUR: CPT

## 2024-08-29 RX ORDER — GABAPENTIN 100 MG/1
200 CAPSULE ORAL 2 TIMES DAILY
Status: DISCONTINUED | OUTPATIENT
Start: 2024-08-29 | End: 2024-08-30

## 2024-08-29 RX ORDER — POTASSIUM CHLORIDE 14.9 MG/ML
20 INJECTION INTRAVENOUS ONCE
Status: COMPLETED | OUTPATIENT
Start: 2024-08-29 | End: 2024-08-29

## 2024-08-29 ASSESSMENT — LIFESTYLE VARIABLES
ANXIETY: NO ANXIETY, AT EASE
ANXIETY: NO ANXIETY, AT EASE
HEADACHE, FULLNESS IN HEAD: NOT PRESENT
TOTAL SCORE: 4
AUDITORY DISTURBANCES: NOT PRESENT
HEADACHE, FULLNESS IN HEAD: NOT PRESENT
ANXIETY: NO ANXIETY, AT EASE
ANXIETY: NO ANXIETY, AT EASE
VISUAL DISTURBANCES: NOT PRESENT
VISUAL DISTURBANCES: NOT PRESENT
AUDITORY DISTURBANCES: NOT PRESENT
AGITATION: NORMAL ACTIVITY
AGITATION: NORMAL ACTIVITY
NAUSEA AND VOMITING: NO NAUSEA AND NO VOMITING
PAROXYSMAL SWEATS: NO SWEAT VISIBLE
NAUSEA AND VOMITING: NO NAUSEA AND NO VOMITING
TREMOR: NO TREMOR
HEADACHE, FULLNESS IN HEAD: NOT PRESENT
TREMOR: 3
TOTAL SCORE: 5
TOTAL SCORE: 0
TOTAL SCORE: 3
TREMOR: 3
TOTAL SCORE: 3
AGITATION: SOMEWHAT MORE THAN NORMAL ACTIVITY
VISUAL DISTURBANCES: NOT PRESENT
NAUSEA AND VOMITING: NO NAUSEA AND NO VOMITING
ORIENTATION AND CLOUDING OF SENSORIUM: ORIENTED AND CAN DO SERIAL ADDITIONS
TREMOR: 2
TREMOR: 3
PAROXYSMAL SWEATS: NO SWEAT VISIBLE
AGITATION: 2
VISUAL DISTURBANCES: NOT PRESENT
PAROXYSMAL SWEATS: NO SWEAT VISIBLE
AUDITORY DISTURBANCES: NOT PRESENT
ANXIETY: NO ANXIETY, AT EASE
PAROXYSMAL SWEATS: NO SWEAT VISIBLE
VISUAL DISTURBANCES: NOT PRESENT
AUDITORY DISTURBANCES: NOT PRESENT
HEADACHE, FULLNESS IN HEAD: NOT PRESENT
HEADACHE, FULLNESS IN HEAD: NOT PRESENT
NAUSEA AND VOMITING: NO NAUSEA AND NO VOMITING
NAUSEA AND VOMITING: NO NAUSEA AND NO VOMITING
AUDITORY DISTURBANCES: NOT PRESENT
ORIENTATION AND CLOUDING OF SENSORIUM: ORIENTED AND CAN DO SERIAL ADDITIONS
PAROXYSMAL SWEATS: NO SWEAT VISIBLE
AGITATION: NORMAL ACTIVITY
ORIENTATION AND CLOUDING OF SENSORIUM: ORIENTED AND CAN DO SERIAL ADDITIONS
ORIENTATION AND CLOUDING OF SENSORIUM: ORIENTED AND CAN DO SERIAL ADDITIONS
ORIENTATION AND CLOUDING OF SENSORIUM: CANNOT DO SERIAL ADDITIONS OR IS UNCERTAIN ABOUT DATE

## 2024-08-29 ASSESSMENT — PAIN - FUNCTIONAL ASSESSMENT
PAIN_FUNCTIONAL_ASSESSMENT: 0-10

## 2024-08-29 ASSESSMENT — COGNITIVE AND FUNCTIONAL STATUS - GENERAL
PERSONAL GROOMING: A LITTLE
TOILETING: A LOT
MOVING TO AND FROM BED TO CHAIR: A LOT
EATING MEALS: A LITTLE
MOBILITY SCORE: 11
STANDING UP FROM CHAIR USING ARMS: A LOT
WALKING IN HOSPITAL ROOM: A LOT
TURNING FROM BACK TO SIDE WHILE IN FLAT BAD: A LOT
CLIMB 3 TO 5 STEPS WITH RAILING: TOTAL
DAILY ACTIVITIY SCORE: 14
DRESSING REGULAR UPPER BODY CLOTHING: A LOT
MOVING FROM LYING ON BACK TO SITTING ON SIDE OF FLAT BED WITH BEDRAILS: A LOT
DRESSING REGULAR LOWER BODY CLOTHING: A LOT
HELP NEEDED FOR BATHING: A LOT

## 2024-08-29 ASSESSMENT — PAIN SCALES - GENERAL
PAINLEVEL_OUTOF10: 4
PAINLEVEL_OUTOF10: 10 - WORST POSSIBLE PAIN
PAINLEVEL_OUTOF10: 3
PAINLEVEL_OUTOF10: 1
PAINLEVEL_OUTOF10: 0 - NO PAIN

## 2024-08-29 ASSESSMENT — PAIN SCALES - WONG BAKER
WONGBAKER_NUMERICALRESPONSE: NO HURT

## 2024-08-29 ASSESSMENT — ACTIVITIES OF DAILY LIVING (ADL)
BATHING_ASSISTANCE: MAXIMAL
ADL_ASSISTANCE: INDEPENDENT
HOME_MANAGEMENT_TIME_ENTRY: 8
ADL_ASSISTANCE: INDEPENDENT

## 2024-08-29 ASSESSMENT — PAIN DESCRIPTION - DESCRIPTORS
DESCRIPTORS: BURNING
DESCRIPTORS: BURNING
DESCRIPTORS: TINGLING

## 2024-08-29 NOTE — DOCUMENTATION CLARIFICATION NOTE
"    PATIENT:               ISRAEL WITT  ACCT #:                  [id number]  MRN:                       [id number]  :                       1955  ADMIT DATE:       2024 4:01 AM  DISCH DATE:  RESPONDING PROVIDER #:        [id number]          PROVIDER RESPONSE TEXT:    diarrhea of unknown etiology, without sepsis    CDI QUERY TEXT:    Clarification        Instruction:  Based on your assessment of the patient and the clinical information, please provide the requested documentation by clicking on the appropriate radio button and enter any additional information if prompted.    Question: Is there a diagnosis indicative of a patient meeting SIRS criteria and with organ dysfunction in the setting of Infectious Diarrhea    When answering this query, please exercise your independent professional judgment. The fact that a question is being asked, does not imply that any particular answer is desired or expected.    The patient's clinical indicators include:  Clinical Information: Israel Witt is a 68 y.o. male presents with possible GI bleed.  Patient states he has had nausea and vomiting for the past several days.  He notes blood within the vomit, described as dark coffee grounds.  Patient also notes multiple episodes of diarrhea, noted to have stool in his pants     ED PN:  \"Alcoholic cirrhosis of liver with ascites (Multi)\"     GI PN:  \"There was some question of ascending colon thickening. Seems less likely lower GI source. Possible this was related to MWT with his low Plts and raised INR \"  \"Elevated LFTs, Thrombocytopenia, Abnormal CT  -RUQ US  -Acute hep panel  -INR  -Likely new cirrhosis in setting chronic ETOH abuse.\"     Hospitalist (Dr Lisa):  Diarrhea of presumed infectious origin - Assessment & Plan Note by Arleth Lisa MD at 2024  5:36 PM  >10 episodes of watery diarrhea today  Suspect patient likely has infectious diarrhea    Clinical Indicators:   CT A/P W:  " IMPRESSION:  1.Cirrhotic liver with recanalization of the paraumbilical vein.  Small amount of predominantly upper quadrant and right paracolic  gutter ascites, likely due to liver cirrhosis.However correlation  with pancreatic and liver enzymes is recommended to exclude  superimposed pancreatitis.  2.Moderate hiatal hernia similar to prior.Stomach is collapsed  limiting assessment.Circumferential thickening of the colon  compatible with colitis.This may be due to infection, inflammation,  or reactive in the setting of portal colopathy.  WBC:  6.0/4.5/4.3/4.0/4.0/4.3/4.4/3.6  /99/94/103/95/116/130  RR 18/19/21/24/27/22  ALT 79/57/73  /123/142  Bili 6.5/7.1/5.6  INR 2.0/1.9/1.7  PLT 40/32/31/31/29/32/30/32  HH  10.2/5/5/8.3/8.2/8/7.8/8/7.9    Treatment: EGD; GI Consult; ICU; PRBC; LFTs/CT A/P W; TTE    Risk Factors: Age; ETOH;  Options provided:  -- Sepsis 2/2 Infectious Diarrhea with hepatic organ dysfunction of Acute Liver Failure  -- Sepsis with other organ dysfunction, Please specify sepsis associated organ dysfunction below  -- Patient treated for Infectious Diarrhea without Sepsis  -- Other - I will add my own diagnosis  -- Refer to Clinical Documentation Reviewer    Query created by: Marcelina Garay on 8/29/2024 11:45 AM      Electronically signed by:  ROBIN JOSEPH MD 8/29/2024 1:19 PM

## 2024-08-29 NOTE — CARE PLAN
The patient's goals for the shift include rest, eat more    The clinical goals for the shift include stable vitals, stable CIWA scores    Over the shift, the patient made progress toward the following goals.    Problem: Fall/Injury  Goal: Use assistive devices by end of the shift   Outcome:  Progressing       Problem: Fall/Injury  Goal: Not fall by end of shift  Outcome: Progressing  Goal: Be free from injury by end of the shift  Outcome: Progressing  Goal: Verbalize understanding of personal risk factors for fall in the hospital  Outcome: Progressing  Goal: Verbalize understanding of risk factor reduction measures to prevent injury from fall in the home  Outcome: Progressing  Goal: Pace activities to prevent fatigue by end of the shift  Outcome: Progressing     Problem: Pain  Goal: Takes deep breaths with improved pain control throughout the shift  Outcome: Progressing  Goal: Turns in bed with improved pain control throughout the shift  Outcome: Progressing  Goal: Walks with improved pain control throughout the shift  Outcome: Progressing  Goal: Performs ADL's with improved pain control throughout shift  Outcome: Progressing  Goal: Participates in PT with improved pain control throughout the shift  Outcome: Progressing  Goal: Free from acute confusion related to pain meds throughout the shift  Outcome: Progressing     Problem: Skin  Goal: Participates in plan/prevention/treatment measures  Outcome: Progressing  Flowsheets (Taken 8/29/2024 1927)  Participates in plan/prevention/treatment measures:   Discuss with provider PT/OT consult   Increase activity/out of bed for meals   Elevate heels  Goal: Prevent/manage excess moisture  Outcome: Progressing  Flowsheets (Taken 8/29/2024 1927)  Prevent/manage excess moisture:   Cleanse incontinence/protect with barrier cream   Follow provider orders for dressing changes   Moisturize dry skin   Monitor for/manage infection if present   Use wicking fabric (obtain order)  Goal:  Prevent/minimize sheer/friction injuries  Outcome: Progressing  Flowsheets (Taken 8/29/2024 1927)  Prevent/minimize sheer/friction injuries:   Complete micro-shifts as needed if patient unable. Adjust patient position to relieve pressure points, not a full turn   HOB 30 degrees or less   Increase activity/out of bed for meals   Turn/reposition every 2 hours/use positioning/transfer devices   Use pull sheet   Utilize specialty bed per algorithm  Goal: Promote/optimize nutrition  Outcome: Progressing  Flowsheets (Taken 8/29/2024 1927)  Promote/optimize nutrition:   Monitor/record intake including meals   Offer water/supplements/favorite foods  Goal: Promote skin healing  Outcome: Progressing  Flowsheets (Taken 8/29/2024 1927)  Promote skin healing:   Assess skin/pad under line(s)/device(s)   Ensure correct size (line/device) and apply per  instructions   Protective dressings over bony prominences   Rotate device position/do not position patient on device   Turn/reposition every 2 hours/use positioning/transfer devices     Problem: Safety - Adult  Goal: Free from fall injury  Outcome: Progressing  Flowsheets (Taken 8/29/2024 1927)  Free from fall injury:   Instruct family/caregiver on patient safety   Based on caregiver fall risk screen, instruct family/caregiver to ask for assistance with transferring infant if caregiver noted to have fall risk factors     Problem: Discharge Planning  Goal: Discharge to home or other facility with appropriate resources  Outcome: Progressing  Flowsheets (Taken 8/29/2024 1927)  Discharge to home or other facility with appropriate resources:   Identify barriers to discharge with patient and caregiver   Arrange for needed discharge resources and transportation as appropriate   Identify discharge learning needs (meds, wound care, etc)   Arrange for interpreters to assist at discharge as needed   Refer to discharge planning if patient needs post-hospital services based on  physician order or complex needs related to functional status, cognitive ability or social support system     Problem: Chronic Conditions and Co-morbidities  Goal: Patient's chronic conditions and co-morbidity symptoms are monitored and maintained or improved  Outcome: Progressing  Flowsheets (Taken 8/29/2024 1927)  Care Plan - Patient's Chronic Conditions and Co-Morbidity Symptoms are Monitored and Maintained or Improved: Monitor and assess patient's chronic conditions and comorbid symptoms for stability, deterioration, or improvement     Problem: Knowledge Deficit  Goal: Patient/family/caregiver demonstrates understanding of disease process, treatment plan, medications, and discharge instructions  Outcome: Progressing  Flowsheets (Taken 8/29/2024 1927)  Patient/family/caregiver demonstrates understanding of disease process, treatment plan, medications, and discharge instructions:   Complete learning assessment and assess knowledge base   Provide teaching at level of understanding   Provide teaching via preferred learning methods       Problem: Mechanical Ventilation  Goal: Oral health is maintained or improved  Outcome: Progressing  Flowsheets (Taken 8/29/2024 1927)  Oral Health is Maintained or Improved: Assess and monitor condition of lips and mouth and perform oral care per hospital policy  Note: Extubated previously  Goal: Ability to express needs and understand communication  Outcome: Progressing  Note: Extubated previously  Goal: Mobility/activity is maintained at optimum level for patient  Outcome: Progressing  Note: Extubated previously

## 2024-08-29 NOTE — NURSING NOTE
Upon rounding right upper arm single lumen midline with undated CHG dressing intact, with moderate amount of dried blood underneath. IVF infusing at this time. Line placed on 8/26 and was out at 1cm, appears to be out 3-4cm at this time.

## 2024-08-29 NOTE — PROGRESS NOTES
Critical Care Medicine       Date:  8/29/2024  Patient:  Israel Parikh  YOB: 1955  MRN:  [id number]   Admit Date:  8/26/2024      Chief Complaint   Patient presents with    Weakness, Gen    Vomiting Blood         History of Present Illness:  Israel Parikh is a 68 y.o. year old male patient with past medical history that he is not aware of. He states that he has not taking any medications in the past 6 months. He presented to the ER for GI bleed. Patient states he has had nausea and vomiting for the past several days. He notes blood within the vomit, described as dark coffee grounds. Patient also notes multiple episodes of diarrhea, noted to have stool in his pants. [facility] and upon returning home has been too weak to purchase groceries and therefore has not eaten anything in the past several days. Denies fevers. No abdominal pain. No prior history of gastric ulcers. States that he drinks about 1-2 L of whiskey per day, but when he does drink he drinks at least 1 pint. Last drink was 2 days ago      Patient originally admitted to step down but had multiple large bloody BM in the ER. Hgb was 10 when baseline is 14-15. Patient is hemodynamically stable but Hgb dropped from 10 -> 5 after blood BM. Will admit to ICU.      Interval ICU Events:  8/26: Patients vitals remain stable. Hgb: 5.5. 2u PRBCs, 1u PLT, and 1u of FFP that we will hang when it is ready. GI was consulted and will do a scope at bedside. We will cont to monitor hemodynamics and intiate MTP if indicated.    8/27: Patient had EGD yesterday with 2 clips placed. Patient BM starting to become less bloody. Hgb stable. 3 BM today non bloody. Plan to transfer to the floor today.     8/28: Returned to the ICU in setting of DT's.  He was given phenobarb 260mg IV  x 1 and started on phenobarb 97.5mg IV Q 8 (with taper).  He was placed on NIPPV overnight for h/o PAULA.      8/29: Cont  phenobarb taper. CIWA 3-4 last night. Patient alert and oriented 2-3. Restarted gabapentin for neck/arm pain. [facility].     Objective     Past Medical History:   Diagnosis Date    HTN (hypertension)      Past Surgical History:   Procedure Laterality Date    ANKLE SURGERY      CT ANGIO NECK  03/07/2023    CT NECK ANGIO W AND WO IV CONTRAST [facility] CT    NECK SURGERY       No medications prior to admission.     Patient has no known allergies.  Social History     Tobacco Use    Smoking status: Former     Types: Cigarettes     Passive exposure: Never    Smokeless tobacco: Never   Substance Use Topics    Alcohol use: Yes     Comment: rarely    Drug use: Never     No family history on file.    Hospital Medications:             Current Facility-Administered Medications:     carvedilol (Coreg) tablet 3.125 mg, 3.125 mg, oral, BID, Ming Frazier PA-C    dextrose 50 % injection 12.5 g, 12.5 g, intravenous, q15 min PRN, Maddi Latif PA-C    dextrose 50 % injection 25 g, 25 g, intravenous, q15 min PRN, Maddi Latif PA-C    dicyclomine (Bentyl) capsule 10 mg, 10 mg, oral, 4x daily, Maddi Latif PA-C, 10 mg at 08/28/24 2125    pantoprazole (ProtoNix) EC tablet 40 mg, 40 mg, oral, BID, 40 mg at 08/28/24 0919 **OR** esomeprazole (NexIUM) suspension 40 mg, 40 mg, nasoduodenal tube, BID **OR** pantoprazole (ProtoNix) injection 40 mg, 40 mg, intravenous, BID, Maddi Latif PA-C, 40 mg at 08/28/24 2045    folic acid (Folvite) tablet 1 mg, 1 mg, oral, Daily, Maddi Latif PA-C, 1 mg at 08/27/24 0836    glucagon (Glucagen) injection 1 mg, 1 mg, intramuscular, q15 min PRN, Maddi Latif PA-C    glucagon (Glucagen) injection 1 mg, 1 mg, intramuscular, q15 min PRN, Maddi Latif PA-C    metoclopramide (Reglan) injection 5 mg, 5 mg, intravenous, q6h TYREL, Maddi Latif PA-C, 5 mg at 08/29/24 0546    multivitamin with minerals 1 tablet, 1 tablet, oral, Daily, Maddi Latif PA-C, 1  tablet at 08/27/24 0835    ondansetron ODT (Zofran-ODT) disintegrating tablet 4 mg, 4 mg, oral, q8h PRN, 4 mg at 08/27/24 0928 **OR** ondansetron (Zofran) injection 4 mg, 4 mg, intravenous, q8h PRN, Maddi Latif PA-C, 4 mg at 08/26/24 1455    oxygen (O2) therapy, , inhalation, Continuous - Inhalation, Maddi Latif PA-C, 4 L/min at 08/28/24 2026    oxygen (O2) therapy, , inhalation, Continuous - Inhalation, Sebastian Ash PA-C, 4 L/min at 08/28/24 2100    PHENobarbital (Luminal) injection 130 mg, 130 mg, intravenous, q15 min PRN, Sebastian Ash PA-C    PHENobarbital (Luminal) injection 97.5 mg, 97.5 mg, intravenous, q8h, 97.5 mg at 08/29/24 0331 **FOLLOWED BY** PHENobarbital (Luminal) injection 65 mg, 65 mg, intravenous, q8h **FOLLOWED BY** [START ON 8/31/2024] PHENobarbital (Luminal) injection 32.5 mg, 32.5 mg, intravenous, q8h, Sebastian Ash PA-C    potassium chloride 20 mEq in sterile water for injection 100 mL, 20 mEq, intravenous, Once, Sebastian Ash PA-C, Last Rate: 50 mL/hr at 08/29/24 0545, 20 mEq at 08/29/24 0545    thiamine (Vitamin B-1) tablet 100 mg, 100 mg, oral, Daily, Maddi Latif PA-C    Physical Exam:    Heart Rate:  []   Temp:  [36.7 °C (98.1 °F)-37.3 °C (99.1 °F)]   Resp:  [17-29]   BP: ()/(52-90)   Weight:  [94.2 kg (207 lb 10.8 oz)]   SpO2:  [91 %-100 %]     Physical Exam  HENT:      Head: Normocephalic and atraumatic.      Mouth/Throat:      Mouth: Mucous membranes are dry.   Eyes:      Pupils: Pupils are equal, round, and reactive to light.   Cardiovascular:      Rate and Rhythm: Regular rhythm. Tachycardia present.      Pulses: Normal pulses.      Heart sounds: Normal heart sounds.   Pulmonary:      Effort: Pulmonary effort is normal.      Breath sounds: Normal breath sounds.   Abdominal:      General: distension.      Palpations: Abdomen is soft.      Tenderness: There is abdominal tenderness in epigastric area.   Musculoskeletal:         General: No swelling.   Skin:      General: Skin is warm.      Capillary Refill: Capillary refill takes less than 2 seconds.      Coloration: Skin is pale.   Neurological:      General: No focal deficit present.      Mental Status: He is alert.   Psychiatric:         Mood and Affect: Mood normal.        Review of Systems:  14 point review of systems was completed and negative except for those specially mention in my HPI    I have reviewed all medications, laboratory results, and imaging pertinent for today's encounter.    FiO2 (%):  [28 %-36 %] 36 %      Intake/Output Summary (Last 24 hours) at 8/29/2024 0733  Last data filed at 8/28/2024 1834  Gross per 24 hour   Intake 2015.42 ml   Output 800 ml   Net 1215.42 ml            Assessment/Plan:    I am currently managing this critically ill patient for the following problems:    Neuro/Psych/Pain Ctrl/Sedation:  Alcohol Abuse, delirium tremens   - Pain Management: tylenol, gabapentin  - [facility] ICU  - CIWA with scheduled phenobarbital taper and as needed per CIWA score     - Folic acid, thiamine     Respiratory/ENT:  - Maintain SPO2 >92%  - Continuous pulse ox monitoring   - CPAP while asleep for PAULA   - Pulm hygiene  - Currently on RA     Cardiovascular:  - Continuous cardiac monitoring per ICU protocol  - Maintain MAPS >65  - Daily EKGs  - Echo 8/26: EF 60-65%     GI:  GI Bleed 2/2 possible esophageal varices; EGD 8/26 with 2 clips to MWT  Cirrhotic liver with mild transaminitis   - Clear liquid diet  - PPI BID  - GI following  -- hep panel WNL  -- RUQ US 8/27: Cirrhosis with evidence of portal venous hypertension resulting in  recanalized umbilical vein which is large in size  -- [facility]  -- Reglan and bentyl     Renal/Volume Status (Intra & Extravascular):  No acute problems, trending towards volume overload    - Monitor I/O's  - I/O balance not accounting for GI losses though he has trace edema   - Replete electrolytes to maintain K >4.0 and Mg >2.0  - Daily BMP, Mg  - Cr:  0.7    Endocrine  No hx of DMII or thyroid concerns  - Monitor for hyper/hypoglycemia      Infectious Disease:  Afebrile, leukopenia   - Monitor for SIRS   - WBC: 4.0    Heme/Onc:  Acute blood loss anemia 2/2 GIB, stable.  Thrombocytopenia 2/2 blood loss and etoh suppression    - Monitor for s/sx of recurrent bleeding   - Transfuse if Hgb <7.0   - Defer PLT transfusion pending active bleeding   - CBC Q12   - Hgb: 8.8     MSK:  Neck/arm pain  - Padded pressure points   - Ambulatory at baseline  - Restarted gabapentin     Skin  - ICU skin protocol     Ethics/Code Status:  Full Code     :  DVT Prophylaxis: Holding GI bleed  GI Prophylaxis: PPI BID  Bowel Regimen: None  Diet: Clear liquid   CVC: None  [place]: None  [place]: None  Restraints: None  Dispo: ICU pending stable CIWA management, [facility]    Critical Care Time:  35 minutes spent in preparing to see patient (I.e.labs,imaging, etc.), documentation, discussion plan of care with patient/family/caregiver, and/ or coordination of care with multidisciplinary team including the attending. Time does not include completion of procedure time.

## 2024-08-29 NOTE — PROGRESS NOTES
Patient not medically clear. [facility] as [facility]. All clinincals again faxed. [facility]. If they cannot accept patient, will discuss SNF with him. Will continue to follow.      08/29/24 5095   Discharge Planning   Home or Post Acute Services Other (Comment)  (TBD)   Expected Discharge Disposition Other  (TBD)   Does the patient need discharge transport arranged? Yes   RoundTrip coordination needed? Yes

## 2024-08-29 NOTE — PROGRESS NOTES
Physical Therapy    Physical Therapy Evaluation & Treatment    Patient Name: Israel Parikh  MRN: [id number]  Today's Date: 8/29/2024   Time Calculation  Start Time: 0945  Stop Time: 1007  Time Calculation (min): 22 min    Assessment/Plan   PT Assessment  PT Assessment Results: Decreased strength, Decreased range of motion, Decreased endurance, Impaired balance, Decreased mobility, Decreased coordination, Pain, Impaired judgement, Impaired sensation  Rehab Prognosis: Good  Evaluation/Treatment Tolerance: Patient tolerated treatment well, Patient limited by fatigue  Medical Staff Made Aware: Yes  End of Session Communication: Bedside nurse  Assessment Comment: pt would benefit from skilled therapy services  End of Session Patient Position: Bed, 3 rail up, Alarm on (call button in reach)  IP OR SWING BED PT PLAN  Inpatient or Swing Bed: Inpatient  PT Plan  Treatment/Interventions: Bed mobility, Transfer training, Gait training, Stair training, Balance training, Strengthening, Endurance training, Therapeutic exercise  PT Plan: Ongoing PT  PT Frequency: 5 times per week  PT Discharge Recommendations: Moderate intensity level of continued care  Equipment Recommended upon Discharge: Wheeled walker  PT Recommended Transfer Status:  (MOD A x 1-2)  PT - OK to Discharge: Yes      Subjective   General Visit Information:  General  Reason for Referral: 68 y.o. male presenting with Gastrointestinal hemorrhage and hematemesis/anemia requiring emergent EGD. impaired mobility  Past Medical History Relevant to Rehab: anemia; ETOH abuse; cirrhosis; thrombocytopenia; hernia repair; HTN  Missed Visit: Yes  Missed Visit Reason:  (Cancel PT Evaluation - attempted multiple times; pt lethargic and unarousable. RN requested pt try back tomorrow.)  Co-Treatment: OT  Co-Treatment Reason: ICU status, to maximize safety and participation due to decreased activity tolerance and lethargy  Prior to Session Communication: Bedside nurse  Patient  Position Received: Bed, 3 rail up  General Comment: pt agreeable with therapy however lethargic throughout session and often keeping eyes closed; pt able to follow commands. pt with frequent bowel incontinence during session and movement.    Home Living:  Home Living  Type of Home: House  Lives With: Alone  Home Adaptive Equipment:  (cane and RW)  Home Layout: Multi-level, Laundry in basement  Home Access:  (3 entry stairs with 1 railing)  Bathroom Shower/Tub: Walk-in shower  Bathroom Accessibility: basement  Home Living Comments: pt has 12 stairs with 1 railing to basement bedroom and bathroom; pt reported sleeping in recliner. pt has a kitchen on main floor; also has 12 stairs with 1 railing to an upstairs but DOES NOT USE    Prior Level of Function:  Prior Function Per Pt/Caregiver Report  Level of [place]: Independent with ADLs and functional transfers, Independent with homemaking with ambulation  Receives Help From:  (none)  ADL Assistance: Independent  Homemaking Assistance: Independent  Ambulatory Assistance: Independent (uses no assistive device)  Vocational: Retired  Prior Function Comments: pt reported being active and driving in community  Precautions:  Precautions  Hearing/Visual Limitations: wears glasses  Medical Precautions: Fall precautions, Oxygen therapy device and L/min, Seizure precautions  Precautions Comment: CIWA protocol;    Vital Signs (Past 2hrs)        Date/Time Vitals Session Patient Position Pulse Resp SpO2 BP MAP (mmHg)                          08/29/24 0945 --  --  91  bpm 22 bpm 100%% on 4L of oxygen 100/74  --                                    Objective   Pain:  Pain Assessment  Pain Assessment:  (4/10 stomach discomfort; RN to medicate)    Cognition:  Cognition  Overall Cognitive Status: Within Functional Limits  Arousal/Alertness: Delayed responses to stimuli  Orientation Level: Oriented X4  Following Commands: Follows one step commands with increased time  Processing Speed:  Delayed    General Assessments:        Activity Tolerance  Endurance:  (FAIR+ activity tolerance)    Sensation  Sensation Comment: c/o numbness/tingling in R hand       Coordination  Coordination Comment: very short inconsistent steps    Postural Control  Posture Comment: rounded shoulders    Static Sitting Balance  Static Sitting-Comment/Number of Minutes: GOOD-  Dynamic Sitting Balance  Dynamic Sitting-Comments: GOOD-    Static Standing Balance  Static Standing-Comment/Number of Minutes: FAIR+  Dynamic Standing Balance  Dynamic Standing-Comments: FAIR-      Functional Assessments:    Bed Mobility:  supine to sit with MOD A x 2 for trunk up, B LE and scooting; pt given seated rest break dur to mild fatigue; sit <-> supine with MOD A x 2. repositioned pt in supine.      Transfer:  sit <-> stand with MOD A x 1-2; pt pulling up on braced RW. upon standing pt was incontinent of stool; RN present to assist with viri-care and to doff soiled bedding. pt sat on bedpan on EOB. FAIR+ eccentric control noted.      Ambulation/Gait Training Performed:  pt took 8-10 very short lateral steps toward HOB using RW with MOD A x 1-2. p had difficulty advancing steps. mild sway and soft B knees noted.         Extremity/Trunk Assessments:  RUE   RUE : Within Functional Limits  LUE   LUE: Within Functional Limits  RLE   RLE :  (WFL with 3/5 strength; +abrasion/contusion on shin)  LLE   LLE :  (WFL with 3/5 strength)      Outcome Measures:  [facility] Basic Mobility  Turning from your back to your side while in a flat bed without using bedrails: A lot  Moving from lying on your back to sitting on the side of a flat bed without using bedrails: A lot  Moving to and from bed to chair (including a wheelchair): A lot  Standing up from a chair using your arms (e.g. wheelchair or bedside chair): A lot  To walk in hospital room: A lot  Climbing 3-5 steps with railing: Total  Basic Mobility - Total Score: 11    FSS-ICU  Ambulation: Walks <50 feet with  any assistance x1 or walks any distance with assistance x2 people  Rolling: Moderate assistance (performs 50 - 74% of task)  Sitting: Minimal assistance (performs 75% or more of task)  Transfer Sit-to-Stand: Moderate assistance (performs 50 - 74% of task)  Transfer Supine-to-Sit: Moderate assistance (performs 50 - 74% of task)  Total Score: 14    Encounter Problems       Encounter Problems (Active)       Mobility       STG - Patient will ambulate 75' x 1 using rolling walker with MOD INDEPENDENT (Progressing)       Start:  08/29/24    Expected End:  09/12/24            STG - Patient will negotiate 12 stairs using 1 railing with SBA (Progressing)       Start:  08/29/24    Expected End:  09/12/24               PT Transfers       STG - Patient to transfer to and from sit to supine with MOD INDEPENDENT (Progressing)       Start:  08/29/24    Expected End:  09/12/24            STG - Patient will transfer sit to and from stand with SBA (Progressing)       Start:  08/29/24    Expected End:  09/12/24                   Education Documentation  Precautions, taught by Andrzej Jennings, PT at 8/29/2024 11:12 AM.  Learner: Patient  Readiness: Eager  Method: Explanation  Response: Verbalizes Understanding    Mobility Training, taught by Andrzej Jennings, PT at 8/29/2024 11:12 AM.  Learner: Patient  Readiness: Eager  Method: Explanation  Response: Verbalizes Understanding    Education Comments  No comments found.

## 2024-08-29 NOTE — CARE PLAN
Problem: Fall/Injury  Goal: Not fall by end of shift  Outcome: Progressing  Goal: Be free from injury by end of the shift  Outcome: Progressing  Goal: Verbalize understanding of personal risk factors for fall in the hospital  Outcome: Progressing  Goal: Verbalize understanding of risk factor reduction measures to prevent injury from fall in the home  Outcome: Progressing  Goal: Use assistive devices by end of the shift  Outcome: Progressing  Goal: Pace activities to prevent fatigue by end of the shift  Outcome: Progressing     Problem: Pain  Goal: Takes deep breaths with improved pain control throughout the shift  Outcome: Progressing  Goal: Turns in bed with improved pain control throughout the shift  Outcome: Progressing  Goal: Walks with improved pain control throughout the shift  Outcome: Progressing  Goal: Performs ADL's with improved pain control throughout shift  Outcome: Progressing  Goal: Participates in PT with improved pain control throughout the shift  Outcome: Progressing  Goal: Free from opioid side effects throughout the shift  Outcome: Progressing  Goal: Free from acute confusion related to pain meds throughout the shift  Outcome: Progressing     Problem: Skin  Goal: Participates in plan/prevention/treatment measures  Outcome: Progressing  Flowsheets (Taken 8/28/2024 2359)  Participates in plan/prevention/treatment measures: Elevate heels  Goal: Prevent/manage excess moisture  Outcome: Progressing  Flowsheets (Taken 8/28/2024 2359)  Prevent/manage excess moisture:   Cleanse incontinence/protect with barrier cream   Monitor for/manage infection if present   Moisturize dry skin  Goal: Prevent/minimize sheer/friction injuries  Outcome: Progressing  Flowsheets (Taken 8/28/2024 2359)  Prevent/minimize sheer/friction injuries:   Complete micro-shifts as needed if patient unable. Adjust patient position to relieve pressure points, not a full turn   Use pull sheet   HOB 30 degrees or less  Goal:  Promote/optimize nutrition  Outcome: Progressing  Flowsheets (Taken 8/28/2024 2359)  Promote/optimize nutrition: Offer water/supplements/favorite foods  Goal: Promote skin healing  Outcome: Progressing  Flowsheets (Taken 8/28/2024 2359)  Promote skin healing:   Protective dressings over bony prominences   Assess skin/pad under line(s)/device(s)     Problem: Safety - Adult  Goal: Free from fall injury  Outcome: Progressing     Problem: Discharge Planning  Goal: Discharge to home or other facility with appropriate resources  Outcome: Progressing     Problem: Chronic Conditions and Co-morbidities  Goal: Patient's chronic conditions and co-morbidity symptoms are monitored and maintained or improved  Outcome: Progressing     Problem: Knowledge Deficit  Goal: Patient/family/caregiver demonstrates understanding of disease process, treatment plan, medications, and discharge instructions  Outcome: Progressing   The patient's goals for the shift include feel better    The clinical goals for the shift include Monitor/manage ETOH withdraw    Over the shift, the patient did not make progress toward the following goals. Barriers to progression include. Recommendations to address these barriers include.

## 2024-08-29 NOTE — PROGRESS NOTES
Occupational Therapy    Evaluation/Treatment    Patient Name: Israel Parikh  MRN: [id number]  : 1955  Today's Date: 24  Time Calculation  Start Time: 942  Stop Time: 1000  Time Calculation (min): 18 min       Assessment:  OT Assessment: pt presents with lethargy, generalized weakness, deconditioning and decreased balance which impedes ADL performance. pt would benefit from skilled OT services to address these deficits and to facilitate highest level of independence.  Prognosis: Good  Barriers to Discharge: Decreased caregiver support, Inaccessible home environment  Evaluation/Treatment Tolerance: Patient limited by fatigue  Medical Staff Made Aware: Yes  End of Session Communication: Bedside nurse  End of Session Patient Position: Bed, 3 rail up, Alarm on  OT Assessment Results: Decreased ADL status, Decreased cognition, Decreased endurance, Decreased functional mobility, Decreased upper extremity strength  Prognosis: Good  Barriers to Discharge: Decreased caregiver support, Inaccessible home environment  Evaluation/Treatment Tolerance: Patient limited by fatigue  Medical Staff Made Aware: Yes  Strengths: Ability to acquire knowledge, Attitude of self  Barriers to Participation: Comorbidities  Plan:  Treatment Interventions: ADL retraining, Functional transfer training, UE strengthening/ROM, Endurance training, Cognitive reorientation, Equipment evaluation/education, Compensatory technique education  OT Frequency: 4 times per week  OT Discharge Recommendations: Moderate intensity level of continued care  Equipment Recommended upon Discharge: Wheeled walker, Bedside commode  OT Recommended Transfer Status: Assist of 2, Moderate assist  OT - OK to Discharge: Yes  Treatment Interventions: ADL retraining, Functional transfer training, UE strengthening/ROM, Endurance training, Cognitive reorientation, Equipment evaluation/education, Compensatory technique education    Subjective   general:   OT Received  On: 08/29/24  General  Reason for Referral: ADL impairment; 68 y.o. male presenting with Gastrointestinal hemorrhage with hematemesis/anemia requiring emergent EGD.  Past Medical History Relevant to Rehab: no past medical history  Missed Visit: No  Family/Caregiver Present: No  Co-Treatment: PT  Co-Treatment Reason: ICU status, to maximize safety and participation due to decreased activity tolerance and lethargy  Prior to Session Communication: Bedside nurse  Patient Position Received: Bed, 3 rail up, Alarm on  Preferred Learning Style: verbal, visual  General Comment: pt agreeable with therapy however lethargic throughout session and often keeping eyes closed yet answering questions.  pt with frequent bowel incontinence during session and movement.  Precautions:  Medical Precautions: Fall precautions, Oxygen therapy device and L/min (4L)  Precautions Comment: [facility] protocol    Vital Sign (Past 2hrs)        Date/Time Vitals Session Patient Position Pulse Resp SpO2 BP MAP (mmHg)    08/29/24 0900 --  --  104  34  98 %  90/67  76     08/29/24 0930 --  --  85  19  100 %  88/73  80     08/29/24 0942 During OT  Lying  92  --  100 %  100/74  83                         Pain:  Pain Assessment  Pain Assessment: 0-10  0-10 (Numeric) Pain Score: 4  Pain Type: Acute pain  Pain Location: Abdomen  Pain Interventions: Repositioned    Objective   Cognition:  Overall Cognitive Status: Impaired  Arousal/Alertness: Delayed responses to stimuli  Orientation Level: Oriented X4  Following Commands: Follows one step commands with increased time  Cognition Comments: lethargic, delayed responses  Processing Speed: Delayed           Home Living:  Type of Home: House  Lives With: Alone  Home Adaptive Equipment: Walker rolling or standard, Cane  Home Layout: Two level, Stairs to alternate level with rails, Stairs to alternate level without rails  Alternate Level Stairs-Rails:  (single)  Alternate Level Stairs-Number of Steps: 12 steps to  basement where bedroom/bathroom are located  Home Access: Stairs to enter with rails  Entrance Stairs-Rails:  (single)  Entrance Stairs-Number of Steps: 3  Bathroom Shower/Tub: Walk-in shower  Bathroom Toilet: Standard  Bathroom Equipment: None  Bathroom Accessibility: basement  Home Living Comments: lives in basement, has kitchen and living room on first floor.  Prior Function:  Level of [place]: Independent with ADLs and functional transfers, Independent with homemaking with ambulation  ADL Assistance: Independent  Homemaking Assistance: Independent  Ambulatory Assistance: Independent (no device at baseline)  Vocational: Retired ()  Hand Dominance: Right  Prior Function Comments: +Driving, community ambulator     ADL:  Eating Deficit: Setup (anticipated)  Grooming Assistance: Minimal (anticipated)  Bathing Assistance: Maximal (anticipated)  UE Dressing Assistance: Moderate (anticipated)  LE Dressing Assistance: Maximal (MAX A for donning maday socks due to decreased arousal and fatigue)  Toileting Assistance with Device: Total (pt with significant bowel incontinence during standing trial.  required assist x2 for standing and for hygiene/changing bedding.  pt aware but unable to control)  Activity Tolerance:  Endurance: Decreased tolerance for upright activites  Functional Standing Tolerance:  4-5 minutes     Bed Mobility/Transfers: Bed Mobility  Bed Mobility: Yes  Bed Mobility 1  Bed Mobility 1: Supine to sitting  Level of Assistance 1: Moderate assistance, +2  Bed Mobility Comments 1: assist x2 for managing BLE and for lifting trunk.  patient able to assist throughout transition with cues however limited by fatigue/weakness  Bed Mobility 2  Bed Mobility  2: Sitting to supine  Level of Assistance 2: Moderate assistance, +2  Bed Mobility Comments 2: assist x2 for lifting BLE back into bed and for lowering/guiding trunk; effortful    Transfers  Transfer: Yes  Transfer 1  Technique 1: Sit to stand, Stand  "to sit  Transfer Device 1: Walker  Transfer Level of Assistance 1: Moderate assistance, +2  Trials/Comments 1: MOD A x2 to ascend from edge of bed, cues for positioning and hand placement.  pt retropulsive upon standing and requiring cues for postural corrections  Transfers 2  Technique 2: Lateral  Transfer Device 2: Walker  Transfer Level of Assistance 2: Moderate assistance, +2  Trials/Comments 2: lateral stepping towards left side to position self closer to head of bed with use of FWW.  pt requiring MOD A x2 for steady assist, pt with difficulty advancing BLE throughout    Sitting Balance:  Static Sitting Balance  Static Sitting-Balance Support: Feet supported, Bilateral upper extremity supported  Static Sitting-Level of Assistance: Close supervision  Static Sitting-Comment/Number of Minutes: tolerated sitting EOB for ~5 minutes total  Standing Balance:  Static Standing Balance  Static Standing-Balance Support: Bilateral upper extremity supported  Static Standing-Level of Assistance: Moderate assistance (x1-2)  Static Standing-Comment/Number of Minutes: tolerated standing for 4-5 minutes total during toileting task and lateral stepping.  pt with heavy realiance on BUE for support due to BLE weakness     Vision:Vision - Basic Assessment  Current Vision: Wears glasses all the time  Sensation:  Sensation Comment: pt reporting paresthesia in RUE (from shoulder to fingers) from a \"previous accident\"  Strength:  Strength Comments: BUE grossly 3+/5, pt with generalized weakness noted     Perception:  Inattention/Neglect: Appears intact  Coordination:  Movements are Fluid and Coordinated: No  Upper Body Coordination: decreased rate/accuracy of movements   Hand Function:  Hand Function  Gross Grasp: Functional  Coordination: Functional  Extremities: RUE   RUE : Within Functional Limits and LUE   LUE: Within Functional Limits      Outcome Measures: [facility] Daily Activity  Putting on and taking off regular lower body " clothing: A lot  Bathing (including washing, rinsing, drying): A lot  Putting on and taking off regular upper body clothing: A lot  Toileting, which includes using toilet, bedpan or urinal: A lot  Taking care of personal grooming such as brushing teeth: A little  Eating Meals: A little  Daily Activity - Total Score: 14        Education Documentation  Body Mechanics, taught by Neri Perry OT at 8/29/2024 10:42 AM.  Learner: Patient  Readiness: Acceptance  Method: Explanation, Demonstration  Response: Needs Reinforcement    ADL Training, taught by Neri Perry OT at 8/29/2024 10:42 AM.  Learner: Patient  Readiness: Acceptance  Method: Explanation, Demonstration  Response: Needs Reinforcement    Education Comments  No comments found.        OP EDUCATION:       Goals:  Encounter Problems       Encounter Problems (Active)       ADLs       Patient with complete upper body dressing with supervision level of assistance donning and doffing all UE clothes while edge of bed  (Progressing)       Start:  08/29/24    Expected End:  09/29/24            Patient with complete lower body dressing with supervision level of assistance donning and doffing all LE clothes  with PRN adaptive equipment while edge of bed  (Progressing)       Start:  08/29/24    Expected End:  09/29/24            Patient will complete daily grooming tasks with supervision level of assistance and PRN adaptive equipment while edge of bed . (Progressing)       Start:  08/29/24    Expected End:  09/29/24            Patient will complete toileting including hygiene clothing management/hygiene with minimal assist  level of assistance and raised toilet seat and grab bars. (Progressing)       Start:  08/29/24    Expected End:  09/29/24               TRANSFERS       Patient will complete functional transfer to toilet/commode with least restrictive device with supervision level of assistance. (Progressing)       Start:  08/29/24    Expected End:  09/29/24

## 2024-08-30 LAB
ALBUMIN SERPL-MCNC: 2.9 G/DL (ref 3.5–5)
ALP BLD-CCNC: 66 U/L (ref 35–125)
ALT SERPL-CCNC: 90 U/L (ref 5–40)
ANION GAP SERPL CALC-SCNC: 9 MMOL/L
AST SERPL-CCNC: 132 U/L (ref 5–40)
BASOPHILS # BLD AUTO: 0.05 X10*3/UL (ref 0–0.1)
BASOPHILS NFR BLD AUTO: 1 %
BILIRUB SERPL-MCNC: 4.2 MG/DL (ref 0.1–1.2)
BLOOD EXPIRATION DATE: NORMAL
BLOOD EXPIRATION DATE: NORMAL
BUN SERPL-MCNC: 7 MG/DL (ref 8–25)
CALCIUM SERPL-MCNC: 7.7 MG/DL (ref 8.5–10.4)
CHLORIDE SERPL-SCNC: 102 MMOL/L (ref 97–107)
CO2 SERPL-SCNC: 22 MMOL/L (ref 24–31)
CREAT SERPL-MCNC: 0.6 MG/DL (ref 0.4–1.6)
DISPENSE STATUS: NORMAL
DISPENSE STATUS: NORMAL
EGFRCR SERPLBLD CKD-EPI 2021: >90 ML/MIN/1.73M*2
EOSINOPHIL # BLD AUTO: 0.16 X10*3/UL (ref 0–0.7)
EOSINOPHIL NFR BLD AUTO: 3.4 %
ERYTHROCYTE [DISTWIDTH] IN BLOOD BY AUTOMATED COUNT: 18.5 % (ref 11.5–14.5)
GLUCOSE SERPL-MCNC: 128 MG/DL (ref 65–99)
HCT VFR BLD AUTO: 27.8 % (ref 41–52)
HGB BLD-MCNC: 9.5 G/DL (ref 13.5–17.5)
IMM GRANULOCYTES # BLD AUTO: 0.03 X10*3/UL (ref 0–0.7)
IMM GRANULOCYTES NFR BLD AUTO: 0.6 % (ref 0–0.9)
LYMPHOCYTES # BLD AUTO: 1.79 X10*3/UL (ref 1.2–4.8)
LYMPHOCYTES NFR BLD AUTO: 37.5 %
MAGNESIUM SERPL-MCNC: 2.1 MG/DL (ref 1.6–3.1)
MCH RBC QN AUTO: 32.4 PG (ref 26–34)
MCHC RBC AUTO-ENTMCNC: 34.2 G/DL (ref 32–36)
MCV RBC AUTO: 95 FL (ref 80–100)
MONOCYTES # BLD AUTO: 0.95 X10*3/UL (ref 0.1–1)
MONOCYTES NFR BLD AUTO: 19.9 %
NEUTROPHILS # BLD AUTO: 1.79 X10*3/UL (ref 1.2–7.7)
NEUTROPHILS NFR BLD AUTO: 37.6 %
NRBC BLD-RTO: 0 /100 WBCS (ref 0–0)
PHOSPHATE SERPL-MCNC: 2.5 MG/DL (ref 2.5–4.5)
PLATELET # BLD AUTO: 59 X10*3/UL (ref 150–450)
POTASSIUM SERPL-SCNC: 3.6 MMOL/L (ref 3.4–5.1)
PRODUCT BLOOD TYPE: 6200
PRODUCT BLOOD TYPE: 6200
PRODUCT CODE: NORMAL
PRODUCT CODE: NORMAL
PROT SERPL-MCNC: 4.5 G/DL (ref 5.9–7.9)
RBC # BLD AUTO: 2.93 X10*6/UL (ref 4.5–5.9)
SODIUM SERPL-SCNC: 133 MMOL/L (ref 133–145)
UNIT ABO: NORMAL
UNIT ABO: NORMAL
UNIT NUMBER: NORMAL
UNIT NUMBER: NORMAL
UNIT RH: NORMAL
UNIT RH: NORMAL
UNIT VOLUME: 350
UNIT VOLUME: 350
WBC # BLD AUTO: 4.8 X10*3/UL (ref 4.4–11.3)
XM INTEP: NORMAL
XM INTEP: NORMAL

## 2024-08-30 PROCEDURE — 97530 THERAPEUTIC ACTIVITIES: CPT | Mod: GP

## 2024-08-30 PROCEDURE — 37799 UNLISTED PX VASCULAR SURGERY: CPT

## 2024-08-30 PROCEDURE — 1200000002 HC GENERAL ROOM WITH TELEMETRY DAILY

## 2024-08-30 PROCEDURE — 2500000004 HC RX 250 GENERAL PHARMACY W/ HCPCS (ALT 636 FOR OP/ED): Performed by: NURSE PRACTITIONER

## 2024-08-30 PROCEDURE — 2500000001 HC RX 250 WO HCPCS SELF ADMINISTERED DRUGS (ALT 637 FOR MEDICARE OP): Performed by: NURSE PRACTITIONER

## 2024-08-30 PROCEDURE — 2500000001 HC RX 250 WO HCPCS SELF ADMINISTERED DRUGS (ALT 637 FOR MEDICARE OP)

## 2024-08-30 PROCEDURE — 2500000004 HC RX 250 GENERAL PHARMACY W/ HCPCS (ALT 636 FOR OP/ED)

## 2024-08-30 PROCEDURE — 83735 ASSAY OF MAGNESIUM: CPT

## 2024-08-30 PROCEDURE — 94660 CPAP INITIATION&MGMT: CPT

## 2024-08-30 PROCEDURE — 2500000004 HC RX 250 GENERAL PHARMACY W/ HCPCS (ALT 636 FOR OP/ED): Performed by: STUDENT IN AN ORGANIZED HEALTH CARE EDUCATION/TRAINING PROGRAM

## 2024-08-30 PROCEDURE — 97116 GAIT TRAINING THERAPY: CPT | Mod: GP

## 2024-08-30 PROCEDURE — 97535 SELF CARE MNGMENT TRAINING: CPT | Mod: GO

## 2024-08-30 PROCEDURE — 84460 ALANINE AMINO (ALT) (SGPT): CPT

## 2024-08-30 PROCEDURE — 97530 THERAPEUTIC ACTIVITIES: CPT | Mod: GO

## 2024-08-30 PROCEDURE — 2500000001 HC RX 250 WO HCPCS SELF ADMINISTERED DRUGS (ALT 637 FOR MEDICARE OP): Performed by: STUDENT IN AN ORGANIZED HEALTH CARE EDUCATION/TRAINING PROGRAM

## 2024-08-30 PROCEDURE — 99233 SBSQ HOSP IP/OBS HIGH 50: CPT | Performed by: NURSE PRACTITIONER

## 2024-08-30 PROCEDURE — 84100 ASSAY OF PHOSPHORUS: CPT

## 2024-08-30 PROCEDURE — 85025 COMPLETE CBC W/AUTO DIFF WBC: CPT

## 2024-08-30 RX ORDER — PHENOBARBITAL 32.4 MG/1
64.8 TABLET ORAL EVERY 8 HOURS SCHEDULED
Status: DISCONTINUED | OUTPATIENT
Start: 2024-08-30 | End: 2024-08-31

## 2024-08-30 RX ORDER — POTASSIUM CHLORIDE 14.9 MG/ML
20 INJECTION INTRAVENOUS ONCE
Status: COMPLETED | OUTPATIENT
Start: 2024-08-30 | End: 2024-08-30

## 2024-08-30 RX ORDER — GUAIFENESIN 600 MG/1
600 TABLET, EXTENDED RELEASE ORAL 2 TIMES DAILY PRN
Status: DISCONTINUED | OUTPATIENT
Start: 2024-08-30 | End: 2024-09-02 | Stop reason: HOSPADM

## 2024-08-30 RX ORDER — GABAPENTIN 100 MG/1
200 CAPSULE ORAL 3 TIMES DAILY
Status: DISCONTINUED | OUTPATIENT
Start: 2024-08-30 | End: 2024-09-02 | Stop reason: HOSPADM

## 2024-08-30 RX ORDER — PHENOBARBITAL 32.4 MG/1
32.4 TABLET ORAL EVERY 8 HOURS SCHEDULED
Status: DISCONTINUED | OUTPATIENT
Start: 2024-08-31 | End: 2024-08-31

## 2024-08-30 ASSESSMENT — COGNITIVE AND FUNCTIONAL STATUS - GENERAL
DRESSING REGULAR LOWER BODY CLOTHING: A LOT
EATING MEALS: A LITTLE
TURNING FROM BACK TO SIDE WHILE IN FLAT BAD: A LOT
PERSONAL GROOMING: A LITTLE
MOBILITY SCORE: 12
STANDING UP FROM CHAIR USING ARMS: A LOT
TOILETING: A LOT
WALKING IN HOSPITAL ROOM: A LOT
DRESSING REGULAR UPPER BODY CLOTHING: A LITTLE
HELP NEEDED FOR BATHING: A LOT
CLIMB 3 TO 5 STEPS WITH RAILING: TOTAL
DAILY ACTIVITIY SCORE: 15
MOVING TO AND FROM BED TO CHAIR: A LOT
MOVING FROM LYING ON BACK TO SITTING ON SIDE OF FLAT BED WITH BEDRAILS: A LITTLE

## 2024-08-30 ASSESSMENT — LIFESTYLE VARIABLES
AGITATION: NORMAL ACTIVITY
AUDITORY DISTURBANCES: NOT PRESENT
NAUSEA AND VOMITING: NO NAUSEA AND NO VOMITING
TOTAL SCORE: 2
TREMOR: NOT VISIBLE, BUT CAN BE FELT FINGERTIP TO FINGERTIP
HEADACHE, FULLNESS IN HEAD: NOT PRESENT
TOTAL SCORE: 1
PAROXYSMAL SWEATS: NO SWEAT VISIBLE
NAUSEA AND VOMITING: NO NAUSEA AND NO VOMITING
ORIENTATION AND CLOUDING OF SENSORIUM: ORIENTED AND CAN DO SERIAL ADDITIONS
ANXIETY: NO ANXIETY, AT EASE
ANXIETY: NO ANXIETY, AT EASE
VISUAL DISTURBANCES: NOT PRESENT
AGITATION: NORMAL ACTIVITY
TOTAL SCORE: 1
NAUSEA AND VOMITING: NO NAUSEA AND NO VOMITING
AGITATION: NORMAL ACTIVITY
TREMOR: 2
VISUAL DISTURBANCES: NOT PRESENT
VISUAL DISTURBANCES: NOT PRESENT
ORIENTATION AND CLOUDING OF SENSORIUM: CANNOT DO SERIAL ADDITIONS OR IS UNCERTAIN ABOUT DATE
VISUAL DISTURBANCES: NOT PRESENT
ORIENTATION AND CLOUDING OF SENSORIUM: CANNOT DO SERIAL ADDITIONS OR IS UNCERTAIN ABOUT DATE
ORIENTATION AND CLOUDING OF SENSORIUM: ORIENTED AND CAN DO SERIAL ADDITIONS
AUDITORY DISTURBANCES: NOT PRESENT
AGITATION: NORMAL ACTIVITY
PAROXYSMAL SWEATS: NO SWEAT VISIBLE
HEADACHE, FULLNESS IN HEAD: NOT PRESENT
HEADACHE, FULLNESS IN HEAD: NOT PRESENT
NAUSEA AND VOMITING: NO NAUSEA AND NO VOMITING
TREMOR: NOT VISIBLE, BUT CAN BE FELT FINGERTIP TO FINGERTIP
PAROXYSMAL SWEATS: NO SWEAT VISIBLE
PAROXYSMAL SWEATS: NO SWEAT VISIBLE
AUDITORY DISTURBANCES: NOT PRESENT
HEADACHE, FULLNESS IN HEAD: NOT PRESENT
ANXIETY: NO ANXIETY, AT EASE
AUDITORY DISTURBANCES: NOT PRESENT
ANXIETY: NO ANXIETY, AT EASE
TOTAL SCORE: 3
TREMOR: NOT VISIBLE, BUT CAN BE FELT FINGERTIP TO FINGERTIP

## 2024-08-30 ASSESSMENT — PAIN - FUNCTIONAL ASSESSMENT
PAIN_FUNCTIONAL_ASSESSMENT: 0-10

## 2024-08-30 ASSESSMENT — PAIN SCALES - GENERAL
PAINLEVEL_OUTOF10: 0 - NO PAIN
PAINLEVEL_OUTOF10: 3
PAINLEVEL_OUTOF10: 0 - NO PAIN
PAINLEVEL_OUTOF10: 3
PAINLEVEL_OUTOF10: 0 - NO PAIN

## 2024-08-30 ASSESSMENT — ACTIVITIES OF DAILY LIVING (ADL): HOME_MANAGEMENT_TIME_ENTRY: 12

## 2024-08-30 ASSESSMENT — PAIN SCALES - WONG BAKER
WONGBAKER_NUMERICALRESPONSE: NO HURT
WONGBAKER_NUMERICALRESPONSE: NO HURT

## 2024-08-30 NOTE — PROGRESS NOTES
Occupational Therapy    OT Treatment    Patient Name: Israel Parikh  MRN: [id number]  Today's Date: 8/30/2024  Time Calculation  Start Time: 1318  Stop Time: 1343  Time Calculation (min): 25 min        Assessment:  OT Assessment: steady progress, pt more alert and requiring less assistance with ADLs/mobility on this date.  still limited by fatigue and reduced balance  Prognosis: Good  Barriers to Discharge: Decreased caregiver support, Inaccessible home environment  Evaluation/Treatment Tolerance: Patient limited by fatigue  Medical Staff Made Aware: Yes  End of Session Communication: Bedside nurse  End of Session Patient Position: Bed, 3 rail up, Alarm on  OT Assessment Results: Decreased ADL status, Decreased endurance, Decreased functional mobility  Prognosis: Good  Barriers to Discharge: Decreased caregiver support, Inaccessible home environment  Evaluation/Treatment Tolerance: Patient limited by fatigue  Medical Staff Made Aware: Yes  Strengths: Ability to acquire knowledge, Attitude of self  Barriers to Participation: Comorbidities  Plan:  Treatment Interventions: ADL retraining, Functional transfer training, UE strengthening/ROM, Endurance training, Equipment evaluation/education, Compensatory technique education  OT Frequency: 4 times per week  OT Discharge Recommendations: Moderate intensity level of continued care  Equipment Recommended upon Discharge: Wheeled walker  OT Recommended Transfer Status: Assist of 1, Minimal assist  OT - OK to Discharge: Yes  Treatment Interventions: ADL retraining, Functional transfer training, UE strengthening/ROM, Endurance training, Equipment evaluation/education, Compensatory technique education    Subjective   Previous Visit Info:  OT Last Visit  OT Received On: 08/30/24  General:  General  Reason for Referral: 68 y.o. male presenting with Gastrointestinal hemorrhage and hematemesis/anemia requiring emergent EGD. impaired mobility  Past Medical History Relevant to Rehab:  anemia; ETOH abuse; cirrhosis; thrombocytopenia; hernia repair; HTN  Missed Visit: No  Family/Caregiver Present: No  Co-Treatment: PT  Co-Treatment Reason: ICU status, to maximize safety and participation  Prior to Session Communication: Bedside nurse  Patient Position Received: Bed, 3 rail up, Alarm on  Preferred Learning Style: visual, verbal  General Comment: pt agreeable and pleasant, much more alert and talkative on this date.  pt with moderate fatigue/SOB at end of session and wishing to get back into bed  Precautions:  Medical Precautions: Fall precautions    Vital Signs (Past 2hrs)        Date/Time Vitals Session Patient Position Pulse Resp SpO2 BP MAP (mmHg)    08/30/24 1318 During OT  --  112  --  --  --  --                         Pain:  Pain Assessment  Pain Assessment: 0-10  0-10 (Numeric) Pain Score: 3  Pain Type: Chronic pain  Pain Location: Shoulder  Pain Orientation: Right    Objective    Cognition:  Cognition  Overall Cognitive Status: Within Functional Limits, Impaired  Arousal/Alertness: Appropriate responses to stimuli  Orientation Level: Oriented X4  Following Commands: Follows one step commands with repetition  Processing Speed: Delayed  Coordination:  Movements are Fluid and Coordinated: Yes  Activities of Daily Living: LE Dressing  LE Dressing: Yes  Pants Level of Assistance: Moderate assistance  LE Dressing Where Assessed: Edge of bed  LE Dressing Comments: pt was able to thread LLE into pants while seated via figure-four technique however required assistance for threading RLE after failed attempts due to fatigue and decreased flexibility.  pt was able to manage clothing over hips while standing unsupported however with MIN A for steady assist  Functional Standing Tolerance:  Time: 3-4 minutes  Bed Mobility/Transfers: Bed Mobility 1  Bed Mobility 1: Supine to sitting  Level of Assistance 1: Moderate assistance (x1)  Bed Mobility Comments 1: pt was able to manage BLE to edge of bed however  required MOD A for lifting trunk and scooting hips forward.  performed with HOB slightly elevated and use of bed rail  Bed Mobility 2  Bed Mobility  2: Sitting to supine  Level of Assistance 2: Moderate assistance (x1)  Bed Mobility Comments 2: MOD A for lifting BLE back into bed and for lowering/guiding trunk; effortful    Transfer 1  Technique 1: Sit to stand, Stand to sit  Transfer Device 1: Walker  Transfer Level of Assistance 1: Moderate assistance, Minimum assistance  Trials/Comments 1: 3 stands were performed throughout session from edge of bed.  pt initially requiring MOD A however progressed to MIN A after repetition.  cues for hand placement and positioning  Transfers 2  Transfer to 2: Bed  Technique 2: Stand pivot  Transfer Device 2: Walker  Transfer Level of Assistance 2: Minimum assistance  Trials/Comments 2: pt transferrred back to bed at end of session after functional mobility task with FWW.  required MIN A for steady assist during pivot and when lateral stepping towards right side to position self closer to head of bed      Functional Mobility:  Functional Mobility  Functional Mobility Performed: Yes  Functional Mobility 1  Surface 1: Level tile  Device 1: Rolling walker  Assistance 1: Minimum assistance  Comments 1: household distances including to nursing station in hallway and then back to room with FWW.  pt requiring MIN A for steady assist and extended time to complete due to fatigue.  pt with moderate SOB after trial  Sitting Balance:  Static Sitting Balance  Static Sitting-Balance Support: Feet supported  Static Sitting-Level of Assistance: Close supervision  Standing Balance:  Static Standing Balance  Static Standing-Balance Support: Bilateral upper extremity supported  Static Standing-Level of Assistance: Minimum assistance      Outcome Measures:[facility] Daily Activity  Putting on and taking off regular lower body clothing: A lot  Bathing (including washing, rinsing, drying): A lot  Putting on  and taking off regular upper body clothing: A little  Toileting, which includes using toilet, bedpan or urinal: A lot  Taking care of personal grooming such as brushing teeth: A little  Eating Meals: A little  Daily Activity - Total Score: 15        Education Documentation  Body Mechanics, taught by Neri Perry OT at 8/30/2024  2:08 PM.  Learner: Patient  Readiness: Acceptance  Method: Explanation, Demonstration  Response: Verbalizes Understanding    ADL Training, taught by Neri Perry OT at 8/30/2024  2:08 PM.  Learner: Patient  Readiness: Acceptance  Method: Explanation, Demonstration  Response: Verbalizes Understanding    Education Comments  No comments found.        OP EDUCATION:       Goals:  Encounter Problems       Encounter Problems (Active)       ADLs       Patient with complete upper body dressing with supervision level of assistance donning and doffing all UE clothes while edge of bed  (Progressing)       Start:  08/29/24    Expected End:  09/29/24            Patient with complete lower body dressing with supervision level of assistance donning and doffing all LE clothes  with PRN adaptive equipment while edge of bed  (Progressing)       Start:  08/29/24    Expected End:  09/29/24            Patient will complete daily grooming tasks with supervision level of assistance and PRN adaptive equipment while edge of bed . (Progressing)       Start:  08/29/24    Expected End:  09/29/24            Patient will complete toileting including hygiene clothing management/hygiene with minimal assist  level of assistance and raised toilet seat and grab bars. (Progressing)       Start:  08/29/24    Expected End:  09/29/24               TRANSFERS       Patient will complete functional transfer to toilet/commode with least restrictive device with supervision level of assistance. (Progressing)       Start:  08/29/24    Expected End:  09/29/24

## 2024-08-30 NOTE — NURSING NOTE
Assumed care of pt/ Pt is resting in bed with no complaints of pain or discomfort. Plan of care discussed and call light in reach.

## 2024-08-30 NOTE — NURSING NOTE
Pt has a single lumen midline to R upper arm, drsg intact but does have a large amt of dried bloody drainage under bandage, will change drsg. Drsg has been changed using sterile technique, no active bleeding at this time to site, site without any redness, swelling or drainage. External length of catheter is 3cm, was 1 cm external on insertion, blue cap has been changed, positive blood return noted and flushes easily with NS, clamped and curos cap applied.

## 2024-08-30 NOTE — ASSESSMENT & PLAN NOTE
Unclear etiology, possibly infectious in origin given fever on 8/26; however no stool studies were completed  CT A/P showing evidence of colitis  S/p FMS  Resolved

## 2024-08-30 NOTE — PROGRESS NOTES
Anticipate discharge soon. Patient agreeable to go to SNF. Choiced for [facility], [facility] and [facility]. Referrals sent, waiting on acceptance. No precert needed. Will follow.      08/30/24 9916   Discharge Planning   Home or Post Acute Services Post acute facilities (Rehab/SNF/etc)   Type of Post Acute Facility Services Rehab   Expected Discharge Disposition SNF  (Waiting on acceptance)   Does the patient need discharge transport arranged? Yes   RoundTrip coordination needed? Yes

## 2024-08-30 NOTE — PROGRESS NOTES
Physical Therapy    Physical Therapy Treatment    Patient Name: Israel Parikh  MRN: [id number]  Today's Date: 8/30/2024  Time Calculation  Start Time: 1320  Stop Time: 1345  Time Calculation (min): 25 min       Assessment/Plan   PT Assessment  PT Assessment Results: Decreased strength, Decreased range of motion, Decreased endurance, Impaired balance, Decreased mobility, Decreased coordination, Pain, Impaired judgement, Impaired sensation  Rehab Prognosis: Good  Barriers to Discharge: medical status  Evaluation/Treatment Tolerance: Patient tolerated treatment well  Medical Staff Made Aware: Yes  Strengths: Ability to acquire knowledge  Barriers to Participation: Comorbidities  End of Session Communication: Bedside nurse  Assessment Comment: The patient is progressing well towards functional goals during hospital stay. Pt requires min to modA for transfers and ambulation with RW. The patient would continue to benefit from skilled therapy services to address functional deficits.  End of Session Patient Position: Bed, 3 rail up, Alarm on  PT Plan  Inpatient/Swing Bed or Outpatient: Inpatient  PT Plan  Treatment/Interventions: Bed mobility, Transfer training, Gait training, Stair training, Balance training, Strengthening, Endurance training, Therapeutic exercise  PT Plan: Ongoing PT  PT Frequency: 5 times per week  PT Discharge Recommendations: Moderate intensity level of continued care  Equipment Recommended upon Discharge: Wheeled walker  PT Recommended Transfer Status: Assist x1  PT - OK to Discharge: Yes      General Visit Information:   PT  Visit  PT Received On: 08/30/24  Response to Previous Treatment: Patient with no complaints from previous session.  General  Co-Treatment: OT  Co-Treatment Reason: ICU status, to maximize safety and participation  Prior to Session Communication: Bedside nurse  Patient Position Received: Bed, 3 rail up, Alarm on  Preferred Learning Style: visual, verbal  General Comment: Patient  cleared for therapy follow-up by nsg. Pt presented supine in bed and agreeable to session.    Subjective   Precautions:  Precautions  Hearing/Visual Limitations: wears glasses  Medical Precautions: Fall precautions    Objective   Pain:  Pain Assessment  Pain Assessment: 0-10  0-10 (Numeric) Pain Score: 3  Pain Type: Chronic pain  Pain Location: Shoulder  Pain Orientation: Right  Pain Interventions: Repositioned  Response to Interventions: nsg aware  Cognition:  Cognition  Overall Cognitive Status: Within Functional Limits  Arousal/Alertness: Appropriate responses to stimuli  Orientation Level: Oriented X4  Following Commands: Follows one step commands with increased time  Processing Speed: Delayed  Coordination:  Coordination Comment: increased time and effort for mobility  Postural Control:  Postural Control  Posture Comment: rounded shoulders  Extremity/Trunk Assessments:  RLE   RLE :  (grossly 3+/5)  LLE   LLE :  (grossly 3+/5)  Activity Tolerance:  Activity Tolerance  Endurance: Tolerates 10 - 20 min exercise with multiple rests  Rate of Perceived Exertion (RPE): 6/10  Treatments:  Therapeutic Activity  Therapeutic Activity Performed: Yes  Therapeutic Activity 1: VCs for safe sequencing of mobility and for generalized safety awareness.    Bed Mobility  Bed Mobility: Yes  Bed Mobility 1  Bed Mobility 1: Supine to sitting  Level of Assistance 1: Moderate assistance  Bed Mobility Comments 1: Assist with trunk elevation and scooting hips to EOB; HOB to 30 deg; VCs for reaching for bed rails  Bed Mobility 2  Bed Mobility  2: Sitting to supine  Level of Assistance 2: Moderate assistance  Bed Mobility Comments 2: Assist with BLE into bed and trunk down.    Ambulation/Gait Training  Ambulation/Gait Training Performed: Yes  Ambulation/Gait Training 1  Surface 1: Level tile  Device 1: Rolling walker  Assistance 1: Minimum assistance  Quality of Gait 1: Narrow base of support, Shuffling gait  Comments/Distance (ft) 1:  25ftx2 with RW and Abraham; shuffled-like gait with sukhi minimal toe clearance. VCs for safe navigation of RW. Sukhi flexed knees throughout activity.  Transfers  Transfer: Yes  Transfer 1  Transfer From 1: Bed to, Stand to  Transfer to 1: Stand, Bed  Technique 1: Sit to stand, Stand to sit  Transfer Device 1: Walker  Transfer Level of Assistance 1: Moderate assistance, Minimum assistance  Trials/Comments 1: VCs for safe sequencing; completed x 3    Outcome Measures:  [facility] Basic Mobility  Turning from your back to your side while in a flat bed without using bedrails: A little  Moving from lying on your back to sitting on the side of a flat bed without using bedrails: A lot  Moving to and from bed to chair (including a wheelchair): A lot  Standing up from a chair using your arms (e.g. wheelchair or bedside chair): A lot  To walk in hospital room: A lot  Climbing 3-5 steps with railing: Total  Basic Mobility - Total Score: 12    FSS-ICU  Ambulation: Walks >/ or equal to 50 feet with any assistance x1  Rolling: Minimal assistance (performs 75% or more of task)  Sitting: Moderate assistance (performs 50 - 74% of task)  Transfer Sit-to-Stand: Minimal assistance (performs 75% or more of task)  Transfer Supine-to-Sit: Moderate assistance (performs 50 - 74% of task)  Total Score: 16    Education Documentation  Precautions, taught by Nicole Plata PT at 8/30/2024  3:19 PM.  Learner: Patient  Readiness: Acceptance  Method: Explanation  Response: Verbalizes Understanding  Comment: VCs for safe sequencing of mobility and for generalized safety awareness.    Mobility Training, taught by Nicole Plata PT at 8/30/2024  3:19 PM.  Learner: Patient  Readiness: Acceptance  Method: Explanation  Response: Verbalizes Understanding  Comment: VCs for safe sequencing of mobility and for generalized safety awareness.    Education Comments  No comments found.      Encounter Problems       Encounter Problems (Active)       Mobility       STG -  Patient will ambulate 75' x 1 using rolling walker with MOD INDEPENDENT (Progressing)       Start:  08/29/24    Expected End:  09/12/24            STG - Patient will negotiate 12 stairs using 1 railing with SBA (Progressing)       Start:  08/29/24    Expected End:  09/12/24               PT Transfers       STG - Patient to transfer to and from sit to supine with MOD INDEPENDENT (Progressing)       Start:  08/29/24    Expected End:  09/12/24            STG - Patient will transfer sit to and from stand with SBA (Progressing)       Start:  08/29/24    Expected End:  09/12/24

## 2024-08-30 NOTE — ASSESSMENT & PLAN NOTE
Baseline Hgb ~15  S/p 2 units RBCs, 1 unit FFP, and 1 unit platelets  Hgb stable  Transfuse for Hgb <7

## 2024-08-30 NOTE — NURSING NOTE
FMS removed, pt assisted to BSC, no BM. Pt bathed with assistance, hair washed, teeth brushed, linens changed. Pt assisted to chair, tolerated well. Call light in reach

## 2024-08-30 NOTE — ASSESSMENT & PLAN NOTE
Continue phenobarb taper  Thiamine, folate, multivitamin  Seizure, fall, and aspiration precautions  Recommend cessation

## 2024-08-30 NOTE — CARE PLAN
Problem: Fall/Injury  Goal: Not fall by end of shift  Outcome: Progressing  Goal: Be free from injury by end of the shift  Outcome: Progressing  Goal: Verbalize understanding of personal risk factors for fall in the hospital  Outcome: Progressing  Goal: Verbalize understanding of risk factor reduction measures to prevent injury from fall in the home  Outcome: Progressing  Goal: Use assistive devices by end of the shift  Outcome: Progressing  Goal: Pace activities to prevent fatigue by end of the shift  Outcome: Progressing     Problem: Pain  Goal: Takes deep breaths with improved pain control throughout the shift  Outcome: Progressing  Goal: Turns in bed with improved pain control throughout the shift  Outcome: Progressing  Goal: Walks with improved pain control throughout the shift  Outcome: Progressing  Goal: Performs ADL's with improved pain control throughout shift  Outcome: Progressing  Goal: Participates in PT with improved pain control throughout the shift  Outcome: Progressing  Goal: Free from acute confusion related to pain meds throughout the shift  Outcome: Progressing     Problem: Skin  Goal: Participates in plan/prevention/treatment measures  8/30/2024 0817 by Jennifer Gaming RN  Outcome: Progressing  8/30/2024 0816 by Jennifer Gaming RN  Flowsheets (Taken 8/30/2024 0816)  Participates in plan/prevention/treatment measures:   Discuss with provider PT/OT consult   Elevate heels   Increase activity/out of bed for meals  Goal: Prevent/manage excess moisture  8/30/2024 0817 by Jennifer Gaming RN  Outcome: Progressing  8/30/2024 0816 by Jennifer Gaming RN  Flowsheets (Taken 8/30/2024 0816)  Prevent/manage excess moisture:   Cleanse incontinence/protect with barrier cream   Monitor for/manage infection if present   Follow provider orders for dressing changes   Moisturize dry skin  Goal: Prevent/minimize sheer/friction injuries  8/30/2024 0817 by Jennifer Gaming RN  Outcome:  Progressing  8/30/2024 0816 by Jennifer Gaming RN  Flowsheets (Taken 8/30/2024 0816)  Prevent/minimize sheer/friction injuries:   Complete micro-shifts as needed if patient unable. Adjust patient position to relieve pressure points, not a full turn   Increase activity/out of bed for meals   Use pull sheet   HOB 30 degrees or less   Turn/reposition every 2 hours/use positioning/transfer devices   Utilize specialty bed per algorithm  Goal: Promote/optimize nutrition  8/30/2024 0817 by Jennifer Gaming RN  Outcome: Progressing  8/30/2024 0816 by Jennifer Gaming RN  Flowsheets (Taken 8/30/2024 0816)  Promote/optimize nutrition:   Monitor/record intake including meals   Offer water/supplements/favorite foods  Goal: Promote skin healing  8/30/2024 0817 by Jennifer Gaming RN  Outcome: Progressing  8/30/2024 0816 by Jennifer Gaming RN  Flowsheets (Taken 8/30/2024 0816)  Promote skin healing:   Assess skin/pad under line(s)/device(s)   Protective dressings over bony prominences   Turn/reposition every 2 hours/use positioning/transfer devices   Rotate device position/do not position patient on device   Ensure correct size (line/device) and apply per  instructions     Problem: Safety - Adult  Goal: Free from fall injury  Outcome: Progressing     Problem: Discharge Planning  Goal: Discharge to home or other facility with appropriate resources  Outcome: Progressing     Problem: Chronic Conditions and Co-morbidities  Goal: Patient's chronic conditions and co-morbidity symptoms are monitored and maintained or improved  Outcome: Progressing     Problem: Knowledge Deficit  Goal: Patient/family/caregiver demonstrates understanding of disease process, treatment plan, medications, and discharge instructions  Outcome: Progressing     Problem: Mechanical Ventilation  Goal: Oral health is maintained or improved  Outcome: Progressing  Goal: Ability to express needs and understand communication  Outcome:  Progressing  Goal: Mobility/activity is maintained at optimum level for patient  Outcome: Progressing   The patient's goals for the shift include feel better    The clinical goals for the shift include Patient will remain hemodynamically stable

## 2024-08-30 NOTE — ASSESSMENT & PLAN NOTE
MELD score of 24 on admission  RUQ US showing cirrhosis with evidence of portal venous hypertension   Acute hepatitis panel negative  [facility]

## 2024-08-30 NOTE — CARE PLAN
Problem: Skin  Goal: Participates in plan/prevention/treatment measures  Flowsheets (Taken 8/30/2024 0816)  Participates in plan/prevention/treatment measures:   Discuss with provider PT/OT consult   Elevate heels   Increase activity/out of bed for meals  Goal: Prevent/manage excess moisture  Flowsheets (Taken 8/30/2024 0816)  Prevent/manage excess moisture:   Cleanse incontinence/protect with barrier cream   Monitor for/manage infection if present   Follow provider orders for dressing changes   Moisturize dry skin  Goal: Prevent/minimize sheer/friction injuries  Flowsheets (Taken 8/30/2024 0816)  Prevent/minimize sheer/friction injuries:   Complete micro-shifts as needed if patient unable. Adjust patient position to relieve pressure points, not a full turn   Increase activity/out of bed for meals   Use pull sheet   HOB 30 degrees or less   Turn/reposition every 2 hours/use positioning/transfer devices   Utilize specialty bed per algorithm  Goal: Promote/optimize nutrition  Flowsheets (Taken 8/30/2024 0816)  Promote/optimize nutrition:   Monitor/record intake including meals   Offer water/supplements/favorite foods  Goal: Promote skin healing  Flowsheets (Taken 8/30/2024 0816)  Promote skin healing:   Assess skin/pad under line(s)/device(s)   Protective dressings over bony prominences   Turn/reposition every 2 hours/use positioning/transfer devices   Rotate device position/do not position patient on device   Ensure correct size (line/device) and apply per  instructions   The patient's goals for the shift include feel better    The clinical goals for the shift include Patient will remain hemodynamically stable

## 2024-08-30 NOTE — DOCUMENTATION CLARIFICATION NOTE
"    PATIENT:               ISRAEL WITT  ACCT #:                  [id number]  MRN:                       [id number]  :                       1955  ADMIT DATE:       2024 4:01 AM  DISCH DATE:  RESPONDING PROVIDER #:        [id number]          PROVIDER RESPONSE TEXT:    Non-infectious SIRS without acute organ dysfunction    CDI QUERY TEXT:    Clarification        Instruction:    Based on your assessment of the patient and the clinical information, please provide the requested documentation by clicking on the appropriate radio button and enter any additional information if prompted.    Question: Please further clarify if there is a diagnosis related to the clinical information    When answering this query, please exercise your independent professional judgment. The fact that a question is being asked, does not imply that any particular answer is desired or expected.    The patient's clinical indicators include:  Clinical Information: Israel Witt is a 68 y.o. male presents with possible GI bleed.  Patient states he has had nausea and vomiting for the past several days.  He notes blood within the vomit, described as dark coffee grounds.  Patient also notes multiple episodes of diarrhea, noted to have stool in his pants     ED PN:  \"Alcoholic cirrhosis of liver with ascites (Multi)\"     GI PN:  \"There was some question of ascending colon thickening. Seems less likely lower GI source. Possible this was related to MWT with his low Plts and raised INR \"  \"Elevated LFTs, Thrombocytopenia, Abnormal CT  -RUQ US  -Acute hep panel  -INR  -Likely new cirrhosis in setting chronic ETOH abuse.\"     Hospitalist (Dr Lisa):  Diarrhea of presumed infectious origin - Assessment & Plan Note by Arleth Lisa MD at 2024  5:36 PM  >10 episodes of watery diarrhea today  Suspect patient likely has infectious diarrhea    Clinical Indicators:   CT A/P W:  IMPRESSION:  1.Cirrhotic liver with recanalization of " the paraumbilical vein.  Small amount of predominantly upper quadrant and right paracolic  gutter ascites, likely due to liver cirrhosis.However correlation  with pancreatic and liver enzymes is recommended to exclude  superimposed pancreatitis.  2.Moderate hiatal hernia similar to prior.Stomach is collapsed  limiting assessment.Circumferential thickening of the colon  compatible with colitis.This may be due to infection, inflammation,  or reactive in the setting of portal colopathy.  WBC:  6.0/4.5/4.3/4.0/4.0/4.3/4.4/3.6  /99/94/103/95/116/130  RR 18/19/21/24/27/22  ALT 79/57/73  /123/142  Bili 6.5/7.1/5.6  INR 2.0/1.9/1.7  PLT 40/32/31/31/29/32/30/32  HH  10.2/5/5/8.3/8.2/8/7.8/8/7.9    Treatment: EGD; GI Consult; ICU; PRBC; LFTs/CT A/P W; TTE    Risk Factors: Age; ETOH;  Options provided:  -- Non-infectious SIRS with acute organ dysfunction of Acute Liver Failure  -- Non-infectious SIRS without acute organ dysfunction  -- Other - I will add my own diagnosis  -- Refer to Clinical Documentation Reviewer    Query created by: Marcelina Garay on 8/30/2024 10:27 AM      Electronically signed by:  ROBIN JOSEPH MD 8/30/2024 11:43 AM

## 2024-08-30 NOTE — PROGRESS NOTES
[facility] Critical Care Medicine       Date:  8/30/2024  Patient:  Israel Parikh  YOB: 1955  MRN:  [id number]   Admit Date:  8/26/2024      Chief Complaint   Patient presents with    Weakness, Gen    Vomiting Blood     History of Present Illness:  Israel Parikh is a 68 y.o. year old male patient with no known past medical history - has not takien any medications in the past 6 months. He presented to the ER for GI bleed. Noted blood within the vomit, described as dark coffee grounds and multiple episodes of diarrhea. [facility] states [facility] and upon returning home had been too weak to purchase groceries and therefore has not eaten anything in the past several days. Drinks about 1-2 L of whiskey per day. Last drink was 2 days ago.     Patient originally admitted to step down but had multiple large bloody BM in the ER. Hgb baseline is 14-15 but dropped to 10 and then 5. Remained hemodynamically stable but Hgb dropped but sent to ICU for closer monitoring.      Interval ICU Events:  8/26: Patients vitals remain stable. Hgb: 5.5. 2u PRBCs, 1u PLT, and 1u of FFP that we will hang when it is ready. GI was consulted and will do a scope at bedside. We will cont to monitor hemodynamics and intiate MTP if indicated.     8/27: Patient had EGD yesterday with 2 clips placed. Patient BM starting to become less bloody. Hgb stable. 3 BM today non bloody. Plan to transfer to the floor today.      8/28: Returned to the ICU in setting of DT's.  He was given phenobarb 260mg IV  x 1 and started on phenobarb 97.5mg IV Q 8 (with taper).  He was placed on NIPPV overnight for h/o PAULA.       8/29: Cont phenobarb taper. CIWA 3-4 last night. Patient alert and oriented 2-3. Restarted gabapentin for neck/arm pain. [facility].      8/30: Alert and oriented x 3 today. No complaints. No further nausea or emesis. Still with some diarrhea. FMS in  currently. Tolerated breakfast this am. CIWA only 1, required no PRN pheonobarb overnight. Stable for transfer to [facility].     Medical History:  Past Medical History:   Diagnosis Date    HTN (hypertension)      Past Surgical History:   Procedure Laterality Date    ANKLE SURGERY      CT ANGIO NECK  03/07/2023    CT NECK ANGIO W AND WO IV CONTRAST [facility] CT    NECK SURGERY       No medications prior to admission.     Patient has no known allergies.  Social History     Tobacco Use    Smoking status: Former     Types: Cigarettes     Passive exposure: Never    Smokeless tobacco: Never   Substance Use Topics    Alcohol use: Yes     Comment: rarely    Drug use: Never     No family history on file.    Hospital Medications:           Current Facility-Administered Medications:     dextrose 50 % injection 12.5 g, 12.5 g, intravenous, q15 min PRN, Maddi Latif PA-C    dextrose 50 % injection 25 g, 25 g, intravenous, q15 min PRN, Maddi aLtif PA-C    dicyclomine (Bentyl) capsule 10 mg, 10 mg, oral, 4x daily, Maddi Latif PA-C, 10 mg at 08/29/24 2148    pantoprazole (ProtoNix) EC tablet 40 mg, 40 mg, oral, BID, 40 mg at 08/29/24 0856 **OR** esomeprazole (NexIUM) suspension 40 mg, 40 mg, nasoduodenal tube, BID **OR** pantoprazole (ProtoNix) injection 40 mg, 40 mg, intravenous, BID, Maddi Latif PA-C, 40 mg at 08/29/24 2028    folic acid (Folvite) tablet 1 mg, 1 mg, oral, Daily, Maddi Latif PA-C, 1 mg at 08/29/24 0855    gabapentin (Neurontin) capsule 200 mg, 200 mg, oral, BID, Maddi Latif PA-C, 200 mg at 08/29/24 2028    glucagon (Glucagen) injection 1 mg, 1 mg, intramuscular, q15 min PRN, Maddi Latif PA-C    glucagon (Glucagen) injection 1 mg, 1 mg, intramuscular, q15 min PRN, Maddi Latif PA-C    metoclopramide (Reglan) injection 5 mg, 5 mg, intravenous, q6h TYREL, Maddi Latif PA-C, 5 mg at 08/30/24 0509    multivitamin with minerals 1 tablet, 1 tablet, oral, Daily, Maddi Latif PA-C, 1 tablet at  08/29/24 0856    ondansetron ODT (Zofran-ODT) disintegrating tablet 4 mg, 4 mg, oral, q8h PRN, 4 mg at 08/27/24 0928 **OR** ondansetron (Zofran) injection 4 mg, 4 mg, intravenous, q8h PRN, Maddi Latif PA-C, 4 mg at 08/26/24 1455    oxygen (O2) therapy, , inhalation, Continuous - Inhalation, Maddi Latif PA-C, 2 L/min at 08/29/24 1950    PHENobarbital (Luminal) injection 130 mg, 130 mg, intravenous, q15 min PRN, Maddi Latif PA-C    [COMPLETED] PHENobarbital (Luminal) injection 97.5 mg, 97.5 mg, intravenous, q8h, 97.5 mg at 08/29/24 1207 **FOLLOWED BY** PHENobarbital (Luminal) injection 65 mg, 65 mg, intravenous, q8h, 65 mg at 08/30/24 0446 **FOLLOWED BY** [START ON 8/31/2024] PHENobarbital (Luminal) injection 32.5 mg, 32.5 mg, intravenous, q8h, Maddi Latif PA-C    potassium chloride 20 mEq in sterile water for injection 100 mL, 20 mEq, intravenous, Once, Sebastian Ash PA-C, Last Rate: 50 mL/hr at 08/30/24 0601, 20 mEq at 08/30/24 0601    thiamine (Vitamin B-1) tablet 100 mg, 100 mg, oral, Daily, Maddi Latif PA-C, 100 mg at 08/29/24 0855    Review of Systems:  14 point review of systems was completed and negative except for those specially mention in my HPI    Physical Exam:    Heart Rate:  []   Temp:  [36.5 °C (97.7 °F)-36.8 °C (98.2 °F)]   Resp:  [0-34]   BP: ()/(47-79)   Weight:  [93.7 kg (206 lb 9.1 oz)-94.2 kg (207 lb 10.8 oz)]   SpO2:  [78 %-100 %]     Physical Exam  Vitals and nursing note reviewed.   Constitutional:       General: He is not in acute distress.     Appearance: He is not toxic-appearing or diaphoretic.   HENT:      Head: Normocephalic.   Eyes:      Conjunctiva/sclera: Conjunctivae normal.      Pupils: Pupils are equal, round, and reactive to light.   Cardiovascular:      Rate and Rhythm: Normal rate and regular rhythm.      Pulses:           Radial pulses are 2+ on the right side and 2+ on the left side.        Dorsalis pedis pulses are 2+ on the right side and 2+  on the left side.      Heart sounds: Normal heart sounds, S1 normal and S2 normal.   Pulmonary:      Effort: Pulmonary effort is normal. No tachypnea, accessory muscle usage or respiratory distress.      Breath sounds: Normal breath sounds. No wheezing, rhonchi or rales.   Abdominal:      General: Abdomen is protuberant. Bowel sounds are normal.      Palpations: Abdomen is soft.   Musculoskeletal:      Cervical back: Neck supple.      Right lower leg: No edema.      Left lower leg: No edema.   Skin:     General: Skin is warm.      Capillary Refill: Capillary refill takes less than 2 seconds.      Coloration: Skin is pale. Skin is not cyanotic or jaundiced.      Comments: Scattered echhymosis   Neurological:      Mental Status: He is alert and oriented to person, place, and time.      GCS: GCS eye subscore is 4. GCS verbal subscore is 5. GCS motor subscore is 6.      Sensory: Sensation is intact.      Motor: Weakness present.      Coordination: Coordination is intact.      Comments: Generalized weakness   Psychiatric:         Attention and Perception: Attention normal.         Mood and Affect: Mood normal.         Speech: Speech normal.         Behavior: Behavior normal. Behavior is cooperative.         Objective:    I have reviewed all medications, laboratory results, and imaging pertinent for today's encounter.    FiO2 (%):  [28 %-40 %] 28 %      Intake/Output Summary (Last 24 hours) at 8/30/2024 0758  Last data filed at 8/30/2024 0500  Gross per 24 hour   Intake 1352.08 ml   Output 400 ml   Net 952.08 ml       Daily Labs:  CBC:   Results from last 7 days   Lab Units 08/30/24  0421   WBC AUTO x10*3/uL 4.8   HEMOGLOBIN g/dL 9.5*   HEMATOCRIT % 27.8*   MCV fL 95     BMP:    Results from last 7 days   Lab Units 08/30/24  0421   SODIUM mmol/L 133   POTASSIUM mmol/L 3.6   CHLORIDE mmol/L 102   CO2 mmol/L 22*   BUN mg/dL 7*   CREATININE mg/dL 0.60   CALCIUM mg/dL 7.7*   GLUCOSE mg/dL 128*   MAGNESIUM mg/dL 2.10        Assessment/Plan:     Israel Parikh is a 68 y.o. year old male patient with past medical history etoh use disorder who presented to ED 8/26 for GI bleed. Hbg dropped from 10-->5 so sent to ICU, EGD 8/26: + Esophagitis, hiatal hernia and [place] tear - 2 clips placed, transferred back out of ICU 8/27. Came back 8/28 for acute etoh withdrawal being treated currently with scheduled phenobarb taper.     I am currently managing this critically ill patient for the following problems:     Neuro/Psych/Pain Ctrl/Sedation:  #Hx Alcohol Use Disorder - drinks 1-2 L whiskey per day, last drink 8/27  #Acute alcohol withdrawal  - CIWA with scheduled IV phenobarbital taper + PRN dosing per CIWA score. No PRN doses required overnight. Will transition to oral dosing today.   - Folic acid, thiamine supplementation  - Seizure precautions   - Pain Management: PRN tylenol, gabapentin  - CAM ICU  - Reinforce etoh cessation     Respiratory/ENT:  #Hx PAULA  - Stable on RA this am  - Goal maintain SPO2 >92%  - Continuous pulse ox monitoring   - CPAP at HS was refused for wore 2 L NC at HS  - Pulm hygiene     Cardiovascular:  #Hypotension - Improved   - Continuous cardiac monitoring per ICU protocol  - Maintain MAPS >65  - Daily EKGs  - Echo 8/26: EF 60-65%     GI:  #GI Bleed - EGD 8/26 + Shi Sands tear - 2 clips placed, hiatal hernia, esophagitis  #Cirrhotic liver with mild transaminitis - AST/ALT/Bili flat or trending down. Likely r/t chronic etoh use.  #Diarrhea - leftover from GI bleed? FMS in place  [facility] 8/27: Cirrhosis with evidence of portal venous hypertension   - Full liquid diet tolerated ok today- advance if ok with GI  - PPI BID  - Try FMS removal today  - GI following  -- [facility]  -- On Reglan,  bentyl per GI  - Daily CMP     Renal/Volume Status (Intra & Extravascular):  No acute issues  - Monitor I/O's  - I/O balance not accounting for GI losses though he has trace edema   - Replete  electrolytes to maintain K >4.0 and Mg >2.0  - Daily BMP, Mg  - Cr: 0.6    Endocrine  No active concerns  - Monitor for hyper/hypoglycemia      Infectious Disease:  No active concerns  Afebrile, leukopenia   - Monitor for SIRS   - WBC: 4.0     Heme/Onc:  #Acute blood loss anemia - 2/2 GIB, stable  #Thrombocytopenia - likely 2/2 etoh   H/H up to 9.5/27.8 today  PLT up to 59 from 43 today  Had total 2 units PBRCs, 1 ffp, 1 plt  - Monitor for s/sx of recurrent bleeding   - Transfuse if Hgb <7.0   - Daily CBC      MSK/SKIN:  Weakness - Likely from deconditioning + anemia  - ICU skin protocol  - Padded pressure points   - Ambulatory at baseline  - PT/OT      Ethics/Code Status:  Full Code     :  DVT Prophylaxis: None  GI Prophylaxis: PPI BID  Bowel Regimen: None  Diet: Advance today to regular  CVC: None  Imogene: None  Louis: None  Restraints: None  Dispo: Ok for transfer to [facility]     I have reviewed all medications, laboratory results, and imaging pertinent for today's encounter.  Plan Discussed with Dr. العلي, patient  Non Critical Care Time: 30 minutes  Critical Care [facility]  Jer Cutler CNP

## 2024-08-30 NOTE — PROGRESS NOTES
"Israel Parikh is a 68 y.o. male on day 4 of admission presenting with Gastrointestinal hemorrhage with hematemesis.    Subjective   No further bleeding noted. CIWA has improved to 2/3.        Objective     Physical Exam  HENT:      Head: Normocephalic.      Nose: Nose normal.      Mouth/Throat:      Mouth: Mucous membranes are moist.   Eyes:      Pupils: Pupils are equal, round, and reactive to light.   Cardiovascular:      Rate and Rhythm: Normal rate.   Pulmonary:      Effort: Pulmonary effort is normal.   Abdominal:      Palpations: Abdomen is soft.   Musculoskeletal:      Cervical back: Normal range of motion.   Skin:     General: Skin is warm.   Neurological:      General: No focal deficit present.      Mental Status: He is alert.   Psychiatric:         Mood and Affect: Mood normal.         Last Recorded Vitals  Blood pressure 94/60, pulse 94, temperature 37.1 °C (98.8 °F), temperature source Temporal, resp. rate 13, height 1.753 m (5' 9.02\"), weight 93.7 kg (206 lb 9.1 oz), SpO2 93%.  Intake/Output last 3 Shifts:  I/O last 3 completed shifts:  In: 1352.1 (14.4 mL/kg) [P.O.:540; I.V.:53.8 (0.6 mL/kg); IV Piggyback:758.3]  Out: 500 (5.3 mL/kg) [Urine:500 (0.1 mL/kg/hr)]  Weight: 93.7 kg     Relevant Results  No results found.     Scheduled medications  dicyclomine, 10 mg, oral, 4x daily  pantoprazole, 40 mg, oral, BID   Or  esomeprazole, 40 mg, nasoduodenal tube, BID   Or  pantoprazole, 40 mg, intravenous, BID  folic acid, 1 mg, oral, Daily  gabapentin, 200 mg, oral, BID  metoclopramide, 5 mg, intravenous, q6h TYREL  multivitamin with minerals, 1 tablet, oral, Daily  oxygen, , inhalation, Continuous - Inhalation  [START ON 8/31/2024] PHENobarbitaL, 32.4 mg, oral, q8h TYREL  PHENobarbitaL, 64.8 mg, oral, q8h TYREL  thiamine, 100 mg, oral, Daily      Continuous medications     PRN medications  PRN medications: dextrose, dextrose, glucagon, glucagon, ondansetron ODT **OR** ondansetron  Results for orders placed or " performed during the hospital encounter of 08/26/24 (from the past 24 hour(s))   CBC and Auto Differential   Result Value Ref Range    WBC 4.8 4.4 - 11.3 x10*3/uL    nRBC 0.0 0.0 - 0.0 /100 WBCs    RBC 2.93 (L) 4.50 - 5.90 x10*6/uL    Hemoglobin 9.5 (L) 13.5 - 17.5 g/dL    Hematocrit 27.8 (L) 41.0 - 52.0 %    MCV 95 80 - 100 fL    MCH 32.4 26.0 - 34.0 pg    MCHC 34.2 32.0 - 36.0 g/dL    RDW 18.5 (H) 11.5 - 14.5 %    Platelets 59 (L) 150 - 450 x10*3/uL    Neutrophils % 37.6 40.0 - 80.0 %    Immature Granulocytes %, Automated 0.6 0.0 - 0.9 %    Lymphocytes % 37.5 13.0 - 44.0 %    Monocytes % 19.9 2.0 - 10.0 %    Eosinophils % 3.4 0.0 - 6.0 %    Basophils % 1.0 0.0 - 2.0 %    Neutrophils Absolute 1.79 1.20 - 7.70 x10*3/uL    Immature Granulocytes Absolute, Automated 0.03 0.00 - 0.70 x10*3/uL    Lymphocytes Absolute 1.79 1.20 - 4.80 x10*3/uL    Monocytes Absolute 0.95 0.10 - 1.00 x10*3/uL    Eosinophils Absolute 0.16 0.00 - 0.70 x10*3/uL    Basophils Absolute 0.05 0.00 - 0.10 x10*3/uL   Comprehensive Metabolic Panel   Result Value Ref Range    Glucose 128 (H) 65 - 99 mg/dL    Sodium 133 133 - 145 mmol/L    Potassium 3.6 3.4 - 5.1 mmol/L    Chloride 102 97 - 107 mmol/L    Bicarbonate 22 (L) 24 - 31 mmol/L    Urea Nitrogen 7 (L) 8 - 25 mg/dL    Creatinine 0.60 0.40 - 1.60 mg/dL    eGFR >90 >60 mL/min/1.73m*2    Calcium 7.7 (L) 8.5 - 10.4 mg/dL    Albumin 2.9 (L) 3.5 - 5.0 g/dL    Alkaline Phosphatase 66 35 - 125 U/L    Total Protein 4.5 (L) 5.9 - 7.9 g/dL     (H) 5 - 40 U/L    Bilirubin, Total 4.2 (H) 0.1 - 1.2 mg/dL    ALT 90 (H) 5 - 40 U/L    Anion Gap 9 <=19 mmol/L   Magnesium   Result Value Ref Range    Magnesium 2.10 1.60 - 3.10 mg/dL   Phosphorus   Result Value Ref Range    Phosphorus 2.5 2.5 - 4.5 mg/dL                            Assessment/Plan   Assessment & Plan  Gastrointestinal hemorrhage with hematemesis    Acute blood loss anemia    Alcohol use with withdrawal (Multi)    Alcoholic cirrhosis of liver  "with ascites (Multi)    Diarrhea of presumed infectious origin    Thrombocytopenia (CMS-HCC)    GI Bleed, Anemia  EGD per Dr Davidson 8/26 showing MWT. 2 clips placed. Patient reports sudden onset of vomiting and bloody stools at same time as \"these hiccups\" CTA without source of bleeding. There was some question of ascending colon thickening. Seems less likely lower GI source. Possible this was related to MWT with his low Plts and raised INR . Hgb stable at 9.5. No further bleeding noted.               -Continue PPI BID               -soft diet              -Add scheduled Reglan and Bentyl      Elevated LFTs, Thrombocytopenia, Abnormal CT               -RUQ US RUQ US 8/27: Cirrhosis with evidence of portal venous hypertension resulting in recanalized umbilical vein which is large in size              -Acute hep panel pending              -INR 1.7              -Likely new cirrhosis in setting chronic ETOH abuse. [facility]     GI will sign off     Wendi Metz, APRN-CNP      "

## 2024-08-31 LAB
ALBUMIN SERPL-MCNC: 3.2 G/DL (ref 3.5–5)
ALP BLD-CCNC: 90 U/L (ref 35–125)
ALT SERPL-CCNC: 86 U/L (ref 5–40)
ANION GAP SERPL CALC-SCNC: 10 MMOL/L
AST SERPL-CCNC: 106 U/L (ref 5–40)
BASOPHILS # BLD AUTO: 0.05 X10*3/UL (ref 0–0.1)
BASOPHILS NFR BLD AUTO: 0.9 %
BILIRUB SERPL-MCNC: 3.9 MG/DL (ref 0.1–1.2)
BUN SERPL-MCNC: 4 MG/DL (ref 8–25)
CALCIUM SERPL-MCNC: 8.4 MG/DL (ref 8.5–10.4)
CHLORIDE SERPL-SCNC: 104 MMOL/L (ref 97–107)
CO2 SERPL-SCNC: 22 MMOL/L (ref 24–31)
CREAT SERPL-MCNC: 0.6 MG/DL (ref 0.4–1.6)
EGFRCR SERPLBLD CKD-EPI 2021: >90 ML/MIN/1.73M*2
EOSINOPHIL # BLD AUTO: 0.14 X10*3/UL (ref 0–0.7)
EOSINOPHIL NFR BLD AUTO: 2.5 %
ERYTHROCYTE [DISTWIDTH] IN BLOOD BY AUTOMATED COUNT: 19.3 % (ref 11.5–14.5)
GLUCOSE SERPL-MCNC: 111 MG/DL (ref 65–99)
HCT VFR BLD AUTO: 30.6 % (ref 41–52)
HGB BLD-MCNC: 10.2 G/DL (ref 13.5–17.5)
IMM GRANULOCYTES # BLD AUTO: 0.06 X10*3/UL (ref 0–0.7)
IMM GRANULOCYTES NFR BLD AUTO: 1.1 % (ref 0–0.9)
LYMPHOCYTES # BLD AUTO: 1.87 X10*3/UL (ref 1.2–4.8)
LYMPHOCYTES NFR BLD AUTO: 33.3 %
MAGNESIUM SERPL-MCNC: 2.2 MG/DL (ref 1.6–3.1)
MCH RBC QN AUTO: 32.5 PG (ref 26–34)
MCHC RBC AUTO-ENTMCNC: 33.3 G/DL (ref 32–36)
MCV RBC AUTO: 98 FL (ref 80–100)
MONOCYTES # BLD AUTO: 1.18 X10*3/UL (ref 0.1–1)
MONOCYTES NFR BLD AUTO: 21 %
NEUTROPHILS # BLD AUTO: 2.31 X10*3/UL (ref 1.2–7.7)
NEUTROPHILS NFR BLD AUTO: 41.2 %
NRBC BLD-RTO: 0 /100 WBCS (ref 0–0)
PHOSPHATE SERPL-MCNC: 2.3 MG/DL (ref 2.5–4.5)
PLATELET # BLD AUTO: 72 X10*3/UL (ref 150–450)
POTASSIUM SERPL-SCNC: 3.8 MMOL/L (ref 3.4–5.1)
PROT SERPL-MCNC: 5.1 G/DL (ref 5.9–7.9)
RBC # BLD AUTO: 3.14 X10*6/UL (ref 4.5–5.9)
SODIUM SERPL-SCNC: 136 MMOL/L (ref 133–145)
WBC # BLD AUTO: 5.6 X10*3/UL (ref 4.4–11.3)

## 2024-08-31 PROCEDURE — 85025 COMPLETE CBC W/AUTO DIFF WBC: CPT | Performed by: NURSE PRACTITIONER

## 2024-08-31 PROCEDURE — 2500000001 HC RX 250 WO HCPCS SELF ADMINISTERED DRUGS (ALT 637 FOR MEDICARE OP): Performed by: STUDENT IN AN ORGANIZED HEALTH CARE EDUCATION/TRAINING PROGRAM

## 2024-08-31 PROCEDURE — 83735 ASSAY OF MAGNESIUM: CPT | Performed by: NURSE PRACTITIONER

## 2024-08-31 PROCEDURE — 1200000002 HC GENERAL ROOM WITH TELEMETRY DAILY

## 2024-08-31 PROCEDURE — 2500000004 HC RX 250 GENERAL PHARMACY W/ HCPCS (ALT 636 FOR OP/ED): Performed by: NURSE PRACTITIONER

## 2024-08-31 PROCEDURE — 2500000005 HC RX 250 GENERAL PHARMACY W/O HCPCS: Performed by: NURSE PRACTITIONER

## 2024-08-31 PROCEDURE — 84100 ASSAY OF PHOSPHORUS: CPT | Performed by: NURSE PRACTITIONER

## 2024-08-31 PROCEDURE — 80053 COMPREHEN METABOLIC PANEL: CPT | Performed by: NURSE PRACTITIONER

## 2024-08-31 PROCEDURE — 2500000001 HC RX 250 WO HCPCS SELF ADMINISTERED DRUGS (ALT 637 FOR MEDICARE OP): Performed by: NURSE PRACTITIONER

## 2024-08-31 PROCEDURE — 2500000004 HC RX 250 GENERAL PHARMACY W/ HCPCS (ALT 636 FOR OP/ED): Performed by: STUDENT IN AN ORGANIZED HEALTH CARE EDUCATION/TRAINING PROGRAM

## 2024-08-31 PROCEDURE — 97535 SELF CARE MNGMENT TRAINING: CPT | Mod: GO

## 2024-08-31 PROCEDURE — 2500000001 HC RX 250 WO HCPCS SELF ADMINISTERED DRUGS (ALT 637 FOR MEDICARE OP)

## 2024-08-31 PROCEDURE — 36415 COLL VENOUS BLD VENIPUNCTURE: CPT | Performed by: NURSE PRACTITIONER

## 2024-08-31 RX ORDER — PHENOBARBITAL 32.4 MG/1
32.4 TABLET ORAL EVERY 8 HOURS SCHEDULED
Status: COMPLETED | OUTPATIENT
Start: 2024-08-31 | End: 2024-09-01

## 2024-08-31 ASSESSMENT — LIFESTYLE VARIABLES
NAUSEA AND VOMITING: NO NAUSEA AND NO VOMITING
PAROXYSMAL SWEATS: NO SWEAT VISIBLE
ORIENTATION AND CLOUDING OF SENSORIUM: ORIENTED AND CAN DO SERIAL ADDITIONS
TOTAL SCORE: 1
ORIENTATION AND CLOUDING OF SENSORIUM: ORIENTED AND CAN DO SERIAL ADDITIONS
PULSE: 97
ANXIETY: NO ANXIETY, AT EASE
HEADACHE, FULLNESS IN HEAD: NOT PRESENT
VISUAL DISTURBANCES: NOT PRESENT
VISUAL DISTURBANCES: NOT PRESENT
NAUSEA AND VOMITING: NO NAUSEA AND NO VOMITING
AUDITORY DISTURBANCES: NOT PRESENT
TREMOR: NOT VISIBLE, BUT CAN BE FELT FINGERTIP TO FINGERTIP
ANXIETY: NO ANXIETY, AT EASE
BLOOD PRESSURE: 97/61
AGITATION: SOMEWHAT MORE THAN NORMAL ACTIVITY
TOTAL SCORE: 1
AGITATION: NORMAL ACTIVITY
AUDITORY DISTURBANCES: NOT PRESENT
PAROXYSMAL SWEATS: NO SWEAT VISIBLE
HEADACHE, FULLNESS IN HEAD: NOT PRESENT
TREMOR: NO TREMOR

## 2024-08-31 ASSESSMENT — COGNITIVE AND FUNCTIONAL STATUS - GENERAL
STANDING UP FROM CHAIR USING ARMS: A LITTLE
STANDING UP FROM CHAIR USING ARMS: A LITTLE
CLIMB 3 TO 5 STEPS WITH RAILING: A LITTLE
HELP NEEDED FOR BATHING: A LITTLE
WALKING IN HOSPITAL ROOM: A LITTLE
PERSONAL GROOMING: A LITTLE
TOILETING: A LITTLE
HELP NEEDED FOR BATHING: A LITTLE
MOVING FROM LYING ON BACK TO SITTING ON SIDE OF FLAT BED WITH BEDRAILS: A LITTLE
DRESSING REGULAR LOWER BODY CLOTHING: A LITTLE
PERSONAL GROOMING: A LITTLE
MOVING FROM LYING ON BACK TO SITTING ON SIDE OF FLAT BED WITH BEDRAILS: A LITTLE
CLIMB 3 TO 5 STEPS WITH RAILING: A LITTLE
STANDING UP FROM CHAIR USING ARMS: A LITTLE
DRESSING REGULAR UPPER BODY CLOTHING: A LITTLE
TOILETING: A LOT
TURNING FROM BACK TO SIDE WHILE IN FLAT BAD: A LITTLE
EATING MEALS: A LITTLE
MOVING TO AND FROM BED TO CHAIR: A LITTLE
WALKING IN HOSPITAL ROOM: A LITTLE
DRESSING REGULAR LOWER BODY CLOTHING: A LITTLE
HELP NEEDED FOR BATHING: A LOT
WALKING IN HOSPITAL ROOM: A LITTLE
TURNING FROM BACK TO SIDE WHILE IN FLAT BAD: A LITTLE
MOBILITY SCORE: 18
MOVING TO AND FROM BED TO CHAIR: A LITTLE
PERSONAL GROOMING: A LITTLE
DRESSING REGULAR LOWER BODY CLOTHING: A LITTLE
EATING MEALS: A LITTLE
HELP NEEDED FOR BATHING: A LITTLE
TOILETING: A LITTLE
DAILY ACTIVITIY SCORE: 17
DRESSING REGULAR LOWER BODY CLOTHING: A LITTLE
DRESSING REGULAR UPPER BODY CLOTHING: A LITTLE
DAILY ACTIVITIY SCORE: 18
DRESSING REGULAR UPPER BODY CLOTHING: A LITTLE
EATING MEALS: A LITTLE
PERSONAL GROOMING: A LITTLE
DRESSING REGULAR UPPER BODY CLOTHING: A LITTLE
MOVING TO AND FROM BED TO CHAIR: A LITTLE
TOILETING: A LITTLE
TURNING FROM BACK TO SIDE WHILE IN FLAT BAD: A LITTLE
CLIMB 3 TO 5 STEPS WITH RAILING: A LITTLE
DAILY ACTIVITIY SCORE: 18
MOBILITY SCORE: 18
DAILY ACTIVITIY SCORE: 18

## 2024-08-31 ASSESSMENT — PAIN SCALES - WONG BAKER
WONGBAKER_NUMERICALRESPONSE: NO HURT
WONGBAKER_NUMERICALRESPONSE: HURTS LITTLE BIT

## 2024-08-31 ASSESSMENT — PAIN SCALES - GENERAL
PAINLEVEL_OUTOF10: 0 - NO PAIN
PAINLEVEL_OUTOF10: 0 - NO PAIN
PAINLEVEL_OUTOF10: 2
PAINLEVEL_OUTOF10: 0 - NO PAIN

## 2024-08-31 ASSESSMENT — ACTIVITIES OF DAILY LIVING (ADL): HOME_MANAGEMENT_TIME_ENTRY: 15

## 2024-08-31 ASSESSMENT — PAIN - FUNCTIONAL ASSESSMENT: PAIN_FUNCTIONAL_ASSESSMENT: 0-10

## 2024-08-31 NOTE — PROGRESS NOTES
Israel Parikh is a 68 y.o. male on day 5 of admission presenting with Gastrointestinal hemorrhage with hematemesis.      Subjective   Had some diarrhea last night but hasn't had any episodes this morning. Denies any complaints today. Denies tremors or signs of alcohol withdrawal. Tolerating PO. Does not feel like he's accumulating fluid in his abdomen, denies need for paracentesis at this time.        Objective     Last Recorded Vitals  /66 (BP Location: Left arm, Patient Position: Lying)   Pulse 83   Temp 36.4 °C (97.5 °F) (Oral)   Resp 20   Wt 93.1 kg (205 lb 4 oz)   SpO2 97%   Intake/Output last 3 Shifts:    Intake/Output Summary (Last 24 hours) at 8/31/2024 0902  Last data filed at 8/31/2024 0054  Gross per 24 hour   Intake --   Output 100 ml   Net -100 ml       Admission Weight  Weight: 83.9 kg (185 lb) (08/26/24 0405)    Daily Weight  08/31/24 : 93.1 kg (205 lb 4 oz)    Image Results  US right upper quadrant  Narrative: Interpreted By:  Nuris Montes,   STUDY:  US RIGHT UPPER QUADRANT; 1:12 pm      INDICATION:  Signs/Symptoms:elevated liver function tests      COMPARISON:  04/09/2023      ACCESSION NUMBER(S):  [id number]      ORDERING CLINICIAN:  MARLON MORENO      TECHNIQUE:  Limited abdominal ultrasound of the right upper quadrant was  performed utilizing gray scale imaging.      FINDINGS:  Liver: The hepatic contour is nodular. There is increased  echogenicity of the hepatic parenchyma. The right hepatic lobe is  relatively small in size. The appearance is consistent with  cirrhosis. There is a large recanalized umbilical vein noted. The  main portal vein measures 1.7 cm in diameter. Gallbladder: No  gallbladder wall thickening is seen. There is an echogenic stone seen  within the gallbladder with sludge also noted in the gallbladder  lumen. Sonographic Green's sign: Negative  Pancreas: The pancreatic head and body appear unremarkable.  The  distal pancreatic body and tail are obscured by  overlying bowel gas.  CBD: 4 mm      Cursory evaluation of right kidney shows no abnormality.      There is a small amount of ascites within right upper quadrant.      Impression: Cirrhosis with evidence of portal venous hypertension resulting in  recanalized umbilical vein which is large in size.      Gallbladder sludge with cholelithiasis noted as well. No evidence of  cholecystitis or biliary ductal dilatation.      Small amount of ascites within right upper quadrant.      MACRO:  None.      Signed by: Nuris Montes 8/28/2024 1:16 PM  Dictation workstation:   [id number]      Physical Exam  Constitutional:       General: He is not in acute distress.     Appearance: He is not toxic-appearing.   HENT:      Head: Normocephalic.      Mouth/Throat:      Pharynx: Oropharynx is clear.   Eyes:      General: No scleral icterus.  Cardiovascular:      Rate and Rhythm: Normal rate.   Pulmonary:      Effort: No respiratory distress.      Breath sounds: No wheezing.   Abdominal:      General: There is no distension.      Palpations: Abdomen is soft.   Musculoskeletal:      Right lower leg: No edema.      Left lower leg: No edema.   Neurological:      Mental Status: He is alert and oriented to person, place, and time.         Relevant Results               Assessment/Plan        Assessment & Plan  Gastrointestinal hemorrhage with hematemesis  GI following  S/p EGD 8/26/24 which showed hiatal hernia, small gastric varices, moderate esophagitis, xander-cortez tear of the esophagus s/p clipping x2, edematous mucosa in the 2nd part of the duodenum  Continue PPI BID, reglan, and bentyl  Diet per GI  Acute blood loss anemia  Baseline Hgb ~15  S/p 2 units RBCs, 1 unit FFP, and 1 unit platelets  Hgb stable  Transfuse for Hgb <7  Diarrhea of presumed infectious origin  Unclear etiology, possibly infectious in origin given fever on 8/26; however no stool studies were completed  CT A/P showing evidence of colitis  S/p FMS  Resolved    Alcohol use with withdrawal (Multi)  Continue phenobarb taper  Thiamine, folate, multivitamin  Seizure, fall, and aspiration precautions  Recommend cessation  Alcoholic cirrhosis of liver with ascites (Multi)  MELD score of 24 on admission  RUQ US showing cirrhosis with evidence of portal venous hypertension   Acute hepatitis panel negative  [facility]  Thrombocytopenia (CMS-HCC)  Likely due to cirrhosis    Plan:  Doing well, no overt signs of alcohol withdrawal currently, continue phenobarb taper  PT/OT - moderate intensity, patient agreeable to SNF placement  Plan to discharge to SNF, awaiting facility acceptance               Arleth Lisa MD

## 2024-08-31 NOTE — CARE PLAN
The patient's goals for the shift include feel better    The clinical goals for the shift include Remain hemodynamically stable      Problem: Fall/Injury  Goal: Not fall by end of shift  Outcome: Progressing  Goal: Be free from injury by end of the shift  Outcome: Progressing  Goal: Verbalize understanding of personal risk factors for fall in the hospital  Outcome: Progressing  Goal: Verbalize understanding of risk factor reduction measures to prevent injury from fall in the home  Outcome: Progressing  Goal: Use assistive devices by end of the shift  Outcome: Progressing  Goal: Pace activities to prevent fatigue by end of the shift  Outcome: Progressing     Problem: Pain  Goal: Takes deep breaths with improved pain control throughout the shift  Outcome: Progressing  Goal: Turns in bed with improved pain control throughout the shift  Outcome: Progressing  Goal: Walks with improved pain control throughout the shift  Outcome: Progressing  Goal: Performs ADL's with improved pain control throughout shift  Outcome: Progressing  Goal: Participates in PT with improved pain control throughout the shift  Outcome: Progressing  Goal: Free from acute confusion related to pain meds throughout the shift  Outcome: Progressing     Problem: Skin  Goal: Participates in plan/prevention/treatment measures  Outcome: Progressing  Goal: Prevent/manage excess moisture  Outcome: Progressing  Goal: Prevent/minimize sheer/friction injuries  Outcome: Progressing  Goal: Promote/optimize nutrition  Outcome: Progressing  Goal: Promote skin healing  Outcome: Progressing     Problem: Skin  Goal: Participates in plan/prevention/treatment measures  Outcome: Progressing  Goal: Prevent/manage excess moisture  Outcome: Progressing  Goal: Prevent/minimize sheer/friction injuries  Outcome: Progressing  Goal: Promote/optimize nutrition  Outcome: Progressing  Goal: Promote skin healing  Outcome: Progressing     Problem: Safety - Adult  Goal: Free from fall  injury  Outcome: Progressing     Problem: Discharge Planning  Goal: Discharge to home or other facility with appropriate resources  Outcome: Progressing     Problem: Chronic Conditions and Co-morbidities  Goal: Patient's chronic conditions and co-morbidity symptoms are monitored and maintained or improved  Outcome: Progressing     Problem: Knowledge Deficit  Goal: Patient/family/caregiver demonstrates understanding of disease process, treatment plan, medications, and discharge instructions  Outcome: Progressing     Problem: Mechanical Ventilation  Goal: Oral health is maintained or improved  Outcome: Progressing  Goal: Ability to express needs and understand communication  Outcome: Progressing  Goal: Mobility/activity is maintained at optimum level for patient  Outcome: Progressing

## 2024-08-31 NOTE — PROGRESS NOTES
Occupational Therapy    OT Treatment    Patient Name: Israel Parikh  MRN: [id number]  Today's Date: 8/31/2024  Time Calculation  Start Time: 1001  Stop Time: 1016  Time Calculation (min): 15 min        Assessment:  OT Assessment: steady progress, CGA when standing during ADLs and functional mobility  Prognosis: Good  Barriers to Discharge: Decreased caregiver support, Inaccessible home environment  Evaluation/Treatment Tolerance: Patient limited by fatigue  Medical Staff Made Aware: Yes  End of Session Communication: Bedside nurse  End of Session Patient Position: Bed, 3 rail up, Alarm on  OT Assessment Results: Decreased ADL status, Decreased endurance, Decreased functional mobility  Prognosis: Good  Barriers to Discharge: Decreased caregiver support, Inaccessible home environment  Evaluation/Treatment Tolerance: Patient limited by fatigue  Medical Staff Made Aware: Yes  Strengths: Ability to acquire knowledge, Attitude of self  Barriers to Participation: Comorbidities  Plan:  Treatment Interventions: ADL retraining, Functional transfer training, UE strengthening/ROM, Endurance training, Equipment evaluation/education, Compensatory technique education  OT Frequency: 4 times per week  OT Discharge Recommendations: Moderate intensity level of continued care  Equipment Recommended upon Discharge: Wheeled walker  OT Recommended Transfer Status: Assist of 1, Stand by assist  OT - OK to Discharge: Yes  Treatment Interventions: ADL retraining, Functional transfer training, UE strengthening/ROM, Endurance training, Equipment evaluation/education, Compensatory technique education    Subjective   Previous Visit Info:  OT Last Visit  OT Received On: 08/31/24  General:  General  Reason for Referral: 68 y.o. male presenting with Gastrointestinal hemorrhage and hematemesis/anemia requiring emergent EGD.  Past Medical History Relevant to Rehab: anemia; ETOH abuse; cirrhosis; thrombocytopenia; hernia repair; HTN  Missed Visit:  No  Family/Caregiver Present: No  Co-Treatment: PT  Co-Treatment Reason: ICU status, to maximize safety and participation  Prior to Session Communication: Bedside nurse  Patient Position Received: Bed, 3 rail up, Alarm off, not on at start of session  Preferred Learning Style: verbal, visual  General Comment: pt agreeable and cooperative throughout session.  reporting he sometimes gets dizzy when changing of head position however this did not occur during our session  Precautions:  Medical Precautions: Fall precautions    Vital Signs (Past 2hrs)        Date/Time Vitals Session Patient Position Pulse Resp SpO2 BP MAP (mmHg)    08/31/24 1001 During OT  --  106  --  --  --  --                         Pain:  Pain Assessment  Pain Assessment: 0-10  0-10 (Numeric) Pain Score: 0 - No pain  Pain Type: Chronic pain  Pain Location: Shoulder  Pain Orientation: Right    Objective    Cognition:  Cognition  Overall Cognitive Status: Within Functional Limits  Arousal/Alertness: Appropriate responses to stimuli  Orientation Level: Oriented X4  Following Commands: Follows all commands and directions without difficulty  Processing Speed: Delayed  Coordination:  Movements are Fluid and Coordinated: Yes  Activities of Daily Living: Grooming  Grooming Level of Assistance: Close supervision, Contact guard  Grooming Where Assessed: Standing sinkside  Grooming Comments: pt performed oral hygiene and face washing while standing at sink.  pt stood for a total of 5 minutes during task without rest break.  required SBA with intermittent CGA due to increased lateral sway with fatigue.  pt was able to set up task and perform thoroughly without assist    LE Dressing  LE Dressing: Yes  Pants Level of Assistance: Moderate assistance  Sock Level of Assistance: Close supervision  LE Dressing Where Assessed: Edge of bed  LE Dressing Comments: pt was able to don maday socks while seated EOB via figure-four technique however effortful and extended time to  complete  Functional Standing Tolerance:  Time: 5 minutes  Activity: grooming tasks standing at sink in bathroom  Bed Mobility/Transfers: Bed Mobility 1  Bed Mobility 1: Supine to sitting, Sitting to supine  Level of Assistance 1: Close supervision  Bed Mobility Comments 1: no physical assistance, performed with HOB slightly elevated; effortful  Bed Mobility 2  Bed Mobility  2: Sitting to supine  Level of Assistance 2: Moderate assistance (x1)  Bed Mobility Comments 2: MOD A for lifting BLE back into bed and for lowering/guiding trunk; effortful    Transfer 1  Technique 1: Sit to stand, Stand to sit  Transfer Device 1: Walker  Transfer Level of Assistance 1: Contact guard  Trials/Comments 1: from edge of bed, cues for hand placement and positioning  Transfers 2  Transfer to 2: Bed  Technique 2: Stand pivot  Transfer Device 2: Walker  Transfer Level of Assistance 2: Contact guard  Trials/Comments 2: pt transferred back to bed at end of session after ambulating back from bathroom with FWW      Functional Mobility:  Functional Mobility  Functional Mobility Performed: Yes  Functional Mobility 1  Surface 1: Level tile  Device 1: Rolling walker  Assistance 1: Contact guard  Comments 1: household distances including to/from bathroom with FWW.  performed at [facility] for safety however no significant LOB noted besides postural sway  Sitting Balance:  Static Sitting Balance  Static Sitting-Balance Support: Feet supported  Static Sitting-Level of Assistance: Distant supervision  Standing Balance:  Static Standing Balance  Static Standing-Balance Support: No upper extremity supported  Static Standing-Level of Assistance: Contact guard      Outcome Measures:[facility] Daily Activity  Putting on and taking off regular lower body clothing: A little  Bathing (including washing, rinsing, drying): A lot  Putting on and taking off regular upper body clothing: A little  Toileting, which includes using toilet, bedpan or urinal: A lot  Taking care  of personal grooming such as brushing teeth: A little  Eating Meals: None  Daily Activity - Total Score: 17        Education Documentation  Body Mechanics, taught by Neri Perry OT at 8/31/2024 10:45 AM.  Learner: Patient  Readiness: Acceptance  Method: Explanation, Demonstration  Response: Verbalizes Understanding, Demonstrated Understanding    ADL Training, taught by Neri Perry OT at 8/31/2024 10:45 AM.  Learner: Patient  Readiness: Acceptance  Method: Explanation, Demonstration  Response: Verbalizes Understanding, Demonstrated Understanding    Body Mechanics, taught by Neri Perry OT at 8/30/2024  2:08 PM.  Learner: Patient  Readiness: Acceptance  Method: Explanation, Demonstration  Response: Verbalizes Understanding    ADL Training, taught by Neri Perry OT at 8/30/2024  2:08 PM.  Learner: Patient  Readiness: Acceptance  Method: Explanation, Demonstration  Response: Verbalizes Understanding    Education Comments  No comments found.        OP EDUCATION:       Goals:  Encounter Problems       Encounter Problems (Active)       ADLs       Patient with complete upper body dressing with supervision level of assistance donning and doffing all UE clothes while edge of bed  (Progressing)       Start:  08/29/24    Expected End:  09/29/24            Patient with complete lower body dressing with supervision level of assistance donning and doffing all LE clothes  with PRN adaptive equipment while edge of bed  (Progressing)       Start:  08/29/24    Expected End:  09/29/24            Patient will complete daily grooming tasks with supervision level of assistance and PRN adaptive equipment while edge of bed . (Progressing)       Start:  08/29/24    Expected End:  09/29/24            Patient will complete toileting including hygiene clothing management/hygiene with minimal assist  level of assistance and raised toilet seat and grab bars. (Progressing)       Start:  08/29/24    Expected End:  09/29/24                TRANSFERS       Patient will complete functional transfer to toilet/commode with least restrictive device with supervision level of assistance. (Progressing)       Start:  08/29/24    Expected End:  09/29/24

## 2024-08-31 NOTE — PROGRESS NOTES
08/31/24 0917   Discharge Planning   Expected Discharge Disposition SNF   Does the patient need discharge transport arranged? Yes   RoundTrip coordination needed? Yes   Has discharge transport been arranged? No     MSW called patient and updated him to referrals and determinations. Patient reports initially first choice was [facility], but he has changed his preference to [facility]. Aki Vasquez made aware they are choice and they did accept the patient. Patient can go to [facility] when medically appropriate.     10:18 AM  Per physician plan for discharge tomorrow, Sunday, 9/1. Aki Vasquez made aware of the same and confirmed they are ready for the patient tomorrow.     Patient requires 09679 to be completed prior to leaving the hospital. Do not discharge patient without speaking to Care Transitions.

## 2024-09-01 VITALS
WEIGHT: 166.89 LBS | SYSTOLIC BLOOD PRESSURE: 90 MMHG | TEMPERATURE: 97.9 F | BODY MASS INDEX: 24.72 KG/M2 | DIASTOLIC BLOOD PRESSURE: 61 MMHG | OXYGEN SATURATION: 93 % | HEART RATE: 86 BPM | RESPIRATION RATE: 18 BRPM | HEIGHT: 69 IN

## 2024-09-01 LAB
ALBUMIN FLD-MCNC: <0.5 G/DL
ALBUMIN SERPL-MCNC: 2.8 G/DL (ref 3.5–5)
ALP BLD-CCNC: 72 U/L (ref 35–125)
ALT SERPL-CCNC: 69 U/L (ref 5–40)
ANION GAP SERPL CALC-SCNC: 9 MMOL/L
AST SERPL-CCNC: 84 U/L (ref 5–40)
BASOPHILS # BLD AUTO: 0.04 X10*3/UL (ref 0–0.1)
BASOPHILS NFR BLD AUTO: 0.8 %
BASOPHILS NFR FLD MANUAL: 0 %
BILIRUB SERPL-MCNC: 2.8 MG/DL (ref 0.1–1.2)
BLASTS NFR FLD MANUAL: 0 %
BUN SERPL-MCNC: 5 MG/DL (ref 8–25)
CALCIUM SERPL-MCNC: 8.2 MG/DL (ref 8.5–10.4)
CHLORIDE SERPL-SCNC: 105 MMOL/L (ref 97–107)
CLARITY FLD: ABNORMAL
CO2 SERPL-SCNC: 22 MMOL/L (ref 24–31)
COLOR FLD: YELLOW
CREAT SERPL-MCNC: 0.7 MG/DL (ref 0.4–1.6)
EGFRCR SERPLBLD CKD-EPI 2021: >90 ML/MIN/1.73M*2
EOSINOPHIL # BLD AUTO: 0.09 X10*3/UL (ref 0–0.7)
EOSINOPHIL NFR BLD AUTO: 1.8 %
EOSINOPHIL NFR FLD MANUAL: 0 %
ERYTHROCYTE [DISTWIDTH] IN BLOOD BY AUTOMATED COUNT: 19.3 % (ref 11.5–14.5)
GLUCOSE BLD MANUAL STRIP-MCNC: 156 MG/DL (ref 74–99)
GLUCOSE SERPL-MCNC: 103 MG/DL (ref 65–99)
GRAM STN SPEC: NORMAL
GRAM STN SPEC: NORMAL
HCT VFR BLD AUTO: 26 % (ref 41–52)
HGB BLD-MCNC: 8.8 G/DL (ref 13.5–17.5)
IMM GRANULOCYTES # BLD AUTO: 0.05 X10*3/UL (ref 0–0.7)
IMM GRANULOCYTES NFR BLD AUTO: 1 % (ref 0–0.9)
IMMATURE GRANULOCYTES IN FLUID: 0 %
LYMPHOCYTES # BLD AUTO: 1.87 X10*3/UL (ref 1.2–4.8)
LYMPHOCYTES NFR BLD AUTO: 38.1 %
LYMPHOCYTES NFR FLD MANUAL: 14 %
MAGNESIUM SERPL-MCNC: 2 MG/DL (ref 1.6–3.1)
MCH RBC QN AUTO: 32.5 PG (ref 26–34)
MCHC RBC AUTO-ENTMCNC: 33.8 G/DL (ref 32–36)
MCV RBC AUTO: 96 FL (ref 80–100)
MONOCYTES # BLD AUTO: 0.96 X10*3/UL (ref 0.1–1)
MONOCYTES NFR BLD AUTO: 19.6 %
MONOS+MACROS NFR FLD MANUAL: 74 %
NEUTROPHILS # BLD AUTO: 1.9 X10*3/UL (ref 1.2–7.7)
NEUTROPHILS NFR BLD AUTO: 38.7 %
NEUTROPHILS NFR FLD MANUAL: 12 %
NRBC BLD-RTO: 0 /100 WBCS (ref 0–0)
OTHER CELLS NFR FLD MANUAL: 0 %
PHOSPHATE SERPL-MCNC: 3.2 MG/DL (ref 2.5–4.5)
PLASMA CELLS NFR FLD MANUAL: 0 %
PLATELET # BLD AUTO: 82 X10*3/UL (ref 150–450)
POTASSIUM SERPL-SCNC: 3.7 MMOL/L (ref 3.4–5.1)
PROT SERPL-MCNC: 4.4 G/DL (ref 5.9–7.9)
RBC # BLD AUTO: 2.71 X10*6/UL (ref 4.5–5.9)
RBC # FLD AUTO: 1000 /UL
SODIUM SERPL-SCNC: 136 MMOL/L (ref 133–145)
TOTAL CELLS COUNTED FLD: 100
WBC # BLD AUTO: 4.9 X10*3/UL (ref 4.4–11.3)
WBC # FLD AUTO: 73 /UL

## 2024-09-01 PROCEDURE — 84100 ASSAY OF PHOSPHORUS: CPT | Performed by: NURSE PRACTITIONER

## 2024-09-01 PROCEDURE — 87070 CULTURE OTHR SPECIMN AEROBIC: CPT | Mod: WESLAB | Performed by: STUDENT IN AN ORGANIZED HEALTH CARE EDUCATION/TRAINING PROGRAM

## 2024-09-01 PROCEDURE — 82042 OTHER SOURCE ALBUMIN QUAN EA: CPT | Mod: WESLAB | Performed by: STUDENT IN AN ORGANIZED HEALTH CARE EDUCATION/TRAINING PROGRAM

## 2024-09-01 PROCEDURE — 2500000004 HC RX 250 GENERAL PHARMACY W/ HCPCS (ALT 636 FOR OP/ED): Performed by: NURSE PRACTITIONER

## 2024-09-01 PROCEDURE — 83735 ASSAY OF MAGNESIUM: CPT | Performed by: NURSE PRACTITIONER

## 2024-09-01 PROCEDURE — 85025 COMPLETE CBC W/AUTO DIFF WBC: CPT | Performed by: NURSE PRACTITIONER

## 2024-09-01 PROCEDURE — 49082 ABD PARACENTESIS: CPT | Performed by: ANESTHESIOLOGY

## 2024-09-01 PROCEDURE — 2500000005 HC RX 250 GENERAL PHARMACY W/O HCPCS: Performed by: NURSE PRACTITIONER

## 2024-09-01 PROCEDURE — 2500000001 HC RX 250 WO HCPCS SELF ADMINISTERED DRUGS (ALT 637 FOR MEDICARE OP): Performed by: NURSE PRACTITIONER

## 2024-09-01 PROCEDURE — 2500000001 HC RX 250 WO HCPCS SELF ADMINISTERED DRUGS (ALT 637 FOR MEDICARE OP): Performed by: STUDENT IN AN ORGANIZED HEALTH CARE EDUCATION/TRAINING PROGRAM

## 2024-09-01 PROCEDURE — 0W9G3ZZ DRAINAGE OF PERITONEAL CAVITY, PERCUTANEOUS APPROACH: ICD-10-PCS | Performed by: ANESTHESIOLOGY

## 2024-09-01 PROCEDURE — 80053 COMPREHEN METABOLIC PANEL: CPT | Performed by: NURSE PRACTITIONER

## 2024-09-01 PROCEDURE — 2500000001 HC RX 250 WO HCPCS SELF ADMINISTERED DRUGS (ALT 637 FOR MEDICARE OP)

## 2024-09-01 PROCEDURE — 1200000002 HC GENERAL ROOM WITH TELEMETRY DAILY

## 2024-09-01 PROCEDURE — 82947 ASSAY GLUCOSE BLOOD QUANT: CPT

## 2024-09-01 PROCEDURE — 87205 SMEAR GRAM STAIN: CPT | Mod: WESLAB | Performed by: STUDENT IN AN ORGANIZED HEALTH CARE EDUCATION/TRAINING PROGRAM

## 2024-09-01 PROCEDURE — 89051 BODY FLUID CELL COUNT: CPT | Performed by: STUDENT IN AN ORGANIZED HEALTH CARE EDUCATION/TRAINING PROGRAM

## 2024-09-01 ASSESSMENT — LIFESTYLE VARIABLES
NAUSEA AND VOMITING: NO NAUSEA AND NO VOMITING
VISUAL DISTURBANCES: NOT PRESENT
PAROXYSMAL SWEATS: NO SWEAT VISIBLE
HEADACHE, FULLNESS IN HEAD: NOT PRESENT
AUDITORY DISTURBANCES: NOT PRESENT
TREMOR: NO TREMOR
TREMOR: NO TREMOR
PAROXYSMAL SWEATS: NO SWEAT VISIBLE
AGITATION: NORMAL ACTIVITY
AUDITORY DISTURBANCES: NOT PRESENT
TOTAL SCORE: 0
ORIENTATION AND CLOUDING OF SENSORIUM: ORIENTED AND CAN DO SERIAL ADDITIONS
HEADACHE, FULLNESS IN HEAD: NOT PRESENT
TOTAL SCORE: 0
NAUSEA AND VOMITING: NO NAUSEA AND NO VOMITING
ANXIETY: NO ANXIETY, AT EASE
ORIENTATION AND CLOUDING OF SENSORIUM: ORIENTED AND CAN DO SERIAL ADDITIONS
ANXIETY: NO ANXIETY, AT EASE
VISUAL DISTURBANCES: NOT PRESENT
AGITATION: NORMAL ACTIVITY

## 2024-09-01 ASSESSMENT — COGNITIVE AND FUNCTIONAL STATUS - GENERAL
DRESSING REGULAR LOWER BODY CLOTHING: A LITTLE
DRESSING REGULAR UPPER BODY CLOTHING: A LITTLE
TOILETING: A LITTLE
DAILY ACTIVITIY SCORE: 18
HELP NEEDED FOR BATHING: A LITTLE
MOVING TO AND FROM BED TO CHAIR: A LITTLE
CLIMB 3 TO 5 STEPS WITH RAILING: A LITTLE
MOBILITY SCORE: 18
STANDING UP FROM CHAIR USING ARMS: A LITTLE
WALKING IN HOSPITAL ROOM: A LITTLE
PERSONAL GROOMING: A LITTLE
EATING MEALS: A LITTLE
TURNING FROM BACK TO SIDE WHILE IN FLAT BAD: A LITTLE
MOVING FROM LYING ON BACK TO SITTING ON SIDE OF FLAT BED WITH BEDRAILS: A LITTLE

## 2024-09-01 ASSESSMENT — PAIN SCALES - GENERAL
PAINLEVEL_OUTOF10: 0 - NO PAIN
PAINLEVEL_OUTOF10: 0 - NO PAIN

## 2024-09-01 NOTE — ASSESSMENT & PLAN NOTE
S/p phenobarb taper  Thiamine, folate, multivitamin  Seizure, fall, and aspiration precautions  Recommend cessation

## 2024-09-01 NOTE — PROGRESS NOTES
09/01/24 1020   Discharge Planning   Living Arrangements Alone   Support Systems None   Type of Residence Private residence   Who is requesting discharge planning? Provider   Home or Post Acute Services Post acute facilities (Rehab/SNF/etc)   Type of Post Acute Facility Services Skilled nursing;Rehab   Expected Discharge Disposition SNF   Does the patient need discharge transport arranged? Yes   RoundTrip coordination needed? Yes   Has discharge transport been arranged? No     MD was originally planning on discharging this patient today but he needs a paracentesis. Dr Arleth Lisa spoke to the intensivist who said he will try to do it today since we don't have IR until Tuesday. Hoping the patient can still be discharged later in the afternoon today but depending on when the paracentesis can be done, we may have to plan for discharge on Monday. Will keep Kylee updated     Spoke with patient via phone, insisting on going tomorrow to [facility], then patient asking about going home prior to skilled rehab, and told him that is not possible since he is going skilled and insurance would not cover. He wants to go home to  his belongs. Told him he would need to find someone to bring his clothes.

## 2024-09-01 NOTE — ASSESSMENT & PLAN NOTE
MELD score of 24 on admission  RUQ US showing cirrhosis with evidence of portal venous hypertension   Acute hepatitis panel negative  [facility]  Needs a paracentesis but IR is unavailable until Tuesday - discussed with Dr. العلي who states he can do a paracentesis later today if he has time

## 2024-09-01 NOTE — PROGRESS NOTES
Israel Parikh is a 68 y.o. male on day 6 of admission presenting with Gastrointestinal hemorrhage with hematemesis.      Subjective   Reports abdominal distension/fullness today, feels like he needs a paracentesis. States he hasn't had a paracentesis in about a month. Has not had any additional episodes of diarrhea, had a BM earlier this morning that was normal. Tolerating PO. Denies symptoms of alcohol withdrawal.        Objective     Last Recorded Vitals  BP 98/62 (BP Location: Left arm, Patient Position: Lying)   Pulse 82   Temp 36.7 °C (98.1 °F) (Oral)   Resp 17   Wt 75.7 kg (166 lb 14.2 oz)   SpO2 93%   Intake/Output last 3 Shifts:    Intake/Output Summary (Last 24 hours) at 9/1/2024 1019  Last data filed at 9/1/2024 0900  Gross per 24 hour   Intake 1050 ml   Output 1 ml   Net 1049 ml       Admission Weight  Weight: 83.9 kg (185 lb) (08/26/24 0405)    Daily Weight  09/01/24 : 75.7 kg (166 lb 14.2 oz)    Image Results  US right upper quadrant  Narrative: Interpreted By:  Nuris Montes,   STUDY:  US RIGHT UPPER QUADRANT; 1:12 pm      INDICATION:  Signs/Symptoms:elevated liver function tests      COMPARISON:  04/09/2023      ACCESSION NUMBER(S):  [id number]      ORDERING CLINICIAN:  MARLON MORENO      TECHNIQUE:  Limited abdominal ultrasound of the right upper quadrant was  performed utilizing gray scale imaging.      FINDINGS:  Liver: The hepatic contour is nodular. There is increased  echogenicity of the hepatic parenchyma. The right hepatic lobe is  relatively small in size. The appearance is consistent with  cirrhosis. There is a large recanalized umbilical vein noted. The  main portal vein measures 1.7 cm in diameter. Gallbladder: No  gallbladder wall thickening is seen. There is an echogenic stone seen  within the gallbladder with sludge also noted in the gallbladder  lumen. Sonographic Green's sign: Negative  Pancreas: The pancreatic head and body appear unremarkable.  The  distal pancreatic body  and tail are obscured by overlying bowel gas.  CBD: 4 mm      Cursory evaluation of right kidney shows no abnormality.      There is a small amount of ascites within right upper quadrant.      Impression: Cirrhosis with evidence of portal venous hypertension resulting in  recanalized umbilical vein which is large in size.      Gallbladder sludge with cholelithiasis noted as well. No evidence of  cholecystitis or biliary ductal dilatation.      Small amount of ascites within right upper quadrant.      MACRO:  None.      Signed by: Nuris Montes 8/28/2024 1:16 PM  Dictation workstation:   [id number]      Physical Exam  Constitutional:       General: He is not in acute distress.     Appearance: He is not toxic-appearing.   HENT:      Head: Normocephalic.      Mouth/Throat:      Pharynx: Oropharynx is clear.   Cardiovascular:      Rate and Rhythm: Normal rate.   Pulmonary:      Effort: No respiratory distress.      Breath sounds: No wheezing.   Abdominal:      General: There is distension.      Tenderness: There is no abdominal tenderness.   Musculoskeletal:      Right lower leg: No edema.      Left lower leg: No edema.   Neurological:      Mental Status: He is alert.   Psychiatric:         Behavior: Behavior normal.         Relevant Results               Assessment/Plan          Assessment & Plan  Gastrointestinal hemorrhage with hematemesis  GI following  S/p EGD 8/26/24 which showed hiatal hernia, small gastric varices, moderate esophagitis, xander-cortez tear of the esophagus s/p clipping x2, edematous mucosa in the 2nd part of the duodenum  Continue PPI BID, reglan, and bentyl  Diet per GI  Acute blood loss anemia  Baseline Hgb ~15  S/p 2 units RBCs, 1 unit FFP, and 1 unit platelets  Hgb stable  Transfuse for Hgb <7  Diarrhea of presumed infectious origin  Unclear etiology, possibly infectious in origin given fever on 8/26; however no stool studies were completed  CT A/P showing evidence of colitis  S/p  FMS  Resolved   Alcohol use with withdrawal (Multi)  S/p phenobarb taper  Thiamine, folate, multivitamin  Seizure, fall, and aspiration precautions  Recommend cessation  Alcoholic cirrhosis of liver with ascites (Multi)  MELD score of 24 on admission  RUQ US showing cirrhosis with evidence of portal venous hypertension   Acute hepatitis panel negative  [facility]  Needs a paracentesis but IR is unavailable until Tuesday - discussed with Dr. العلي who states he can do a paracentesis later today if he has time  Thrombocytopenia (CMS-HCC)  Likely due to cirrhosis    Case discussed with Dr. العلي - will try to do paracentesis at bedside later today if he has time.     Plan:  Completed phenobarb taper yesterday, no signs of alcohol withdrawal currently  Hopefully patient can get paracentesis today, if not will have to wait until Tuesday 9/3 when IR will be available again  Initially planned to discharge patient to SNF today but discharge will have to be postponed until patient can get a paracentesis - updated TCC regarding this      Addendum 11:50 - paracentesis was completed, had 2.8L fluid removed. Low suspicion for SBP but will obtain cell count with diff, albumin, and culture anyway. Patient states he's feeling a lot better now. Discussed discharge to SNF - states he would rather be discharged tomorrow to make sure fluid studies are negative for infection and to make sure there are no complications from the paracentesis. TCC notified.               Arleth Lisa MD

## 2024-09-01 NOTE — PROCEDURES
PARACENTESIS    Ascitic fluid removed:  2800 mL  Appearance:   Straw colored      Procedure Details  Pre-procedure diagnosis: Ascites  Post-procedure diagnosis: Same  Indication(s):  Diagnostic  Performed by:  Me  Assistant(s):  RN  EBL:   Min    Consent obtained: Yes  Patient identified: Yes  Timeout performed: Yes  Sedation / analgesia: None  Sterility:   Hat & mask on myself and assistant(s).  Site prepped with 2% CHG and 70% IPA solution using applicator.  Sterile gloves and drape.  Landmarks identified: Yes  Ultrasound guided: Yes, with sterile probe cover.  Local anesthetic: 8 mL 1% lidocaine using supplied 22 or 25 Ga. hypodermic needle.    Pocket of fluid identified with ultrasound in the RLQ.  Small stab incision made with #11 scalpel after administration of local anesthetic.  8 Fr x 16 cm Safe-T-Centesis catheter loaded over Veress introducer needle.  Introducer needle advanced into peritoneal cavity with aspiration of fluid.  Catheter thread easily over needle into peritoneal cavity and needle removed.  Fluid samples collected in pre-labeled specimen vials, then fluid drained using supplied universal drainage set.  Catheter removed and dressing applied.    No complications.  Other details: None.    [employer]  Safe-T-Centesis 8 Fr Catheter Drainage Tray  Lot #   [id number] and Exp     2026-07-31      Peña العلي MD

## 2024-09-01 NOTE — CARE PLAN
The patient's goals for the shift include feel better    The clinical goals for the shift include Patient will remain safe and free of falls

## 2024-09-02 VITALS
WEIGHT: 166.45 LBS | HEART RATE: 86 BPM | SYSTOLIC BLOOD PRESSURE: 101 MMHG | BODY MASS INDEX: 24.65 KG/M2 | OXYGEN SATURATION: 99 % | TEMPERATURE: 97.3 F | HEIGHT: 69 IN | DIASTOLIC BLOOD PRESSURE: 66 MMHG | RESPIRATION RATE: 18 BRPM

## 2024-09-02 PROBLEM — D69.6 THROMBOCYTOPENIA (CMS-HCC): Status: RESOLVED | Noted: 2024-08-27 | Resolved: 2024-09-02

## 2024-09-02 PROBLEM — K92.0 GASTROINTESTINAL HEMORRHAGE WITH HEMATEMESIS: Status: RESOLVED | Noted: 2024-08-26 | Resolved: 2024-09-02

## 2024-09-02 PROBLEM — D62 ACUTE BLOOD LOSS ANEMIA: Status: RESOLVED | Noted: 2024-08-27 | Resolved: 2024-09-02

## 2024-09-02 PROBLEM — R19.7 DIARRHEA OF PRESUMED INFECTIOUS ORIGIN: Status: RESOLVED | Noted: 2024-08-27 | Resolved: 2024-09-02

## 2024-09-02 PROBLEM — F10.939 ALCOHOL USE WITH WITHDRAWAL (MULTI): Status: RESOLVED | Noted: 2024-08-27 | Resolved: 2024-09-02

## 2024-09-02 LAB
ALBUMIN SERPL-MCNC: 3.4 G/DL (ref 3.5–5)
ALP BLD-CCNC: 93 U/L (ref 35–125)
ALT SERPL-CCNC: 86 U/L (ref 5–40)
ANION GAP SERPL CALC-SCNC: 14 MMOL/L
AST SERPL-CCNC: 102 U/L (ref 5–40)
BASOPHILS # BLD AUTO: 0.09 X10*3/UL (ref 0–0.1)
BASOPHILS NFR BLD AUTO: 1.2 %
BILIRUB SERPL-MCNC: 3.2 MG/DL (ref 0.1–1.2)
BUN SERPL-MCNC: 5 MG/DL (ref 8–25)
BURR CELLS BLD QL SMEAR: NORMAL
CALCIUM SERPL-MCNC: 8.6 MG/DL (ref 8.5–10.4)
CHLORIDE SERPL-SCNC: 102 MMOL/L (ref 97–107)
CO2 SERPL-SCNC: 20 MMOL/L (ref 24–31)
CREAT SERPL-MCNC: 0.7 MG/DL (ref 0.4–1.6)
EGFRCR SERPLBLD CKD-EPI 2021: >90 ML/MIN/1.73M*2
EOSINOPHIL # BLD AUTO: 0.1 X10*3/UL (ref 0–0.7)
EOSINOPHIL NFR BLD AUTO: 1.3 %
ERYTHROCYTE [DISTWIDTH] IN BLOOD BY AUTOMATED COUNT: 19.8 % (ref 11.5–14.5)
GLUCOSE SERPL-MCNC: 101 MG/DL (ref 65–99)
HCT VFR BLD AUTO: 30.6 % (ref 41–52)
HGB BLD-MCNC: 10.3 G/DL (ref 13.5–17.5)
IMM GRANULOCYTES # BLD AUTO: 0.07 X10*3/UL (ref 0–0.7)
IMM GRANULOCYTES NFR BLD AUTO: 0.9 % (ref 0–0.9)
LYMPHOCYTES # BLD AUTO: 2.94 X10*3/UL (ref 1.2–4.8)
LYMPHOCYTES NFR BLD AUTO: 38 %
MAGNESIUM SERPL-MCNC: 2.1 MG/DL (ref 1.6–3.1)
MCH RBC QN AUTO: 32.3 PG (ref 26–34)
MCHC RBC AUTO-ENTMCNC: 33.7 G/DL (ref 32–36)
MCV RBC AUTO: 96 FL (ref 80–100)
MONOCYTES # BLD AUTO: 1.29 X10*3/UL (ref 0.1–1)
MONOCYTES NFR BLD AUTO: 16.7 %
NEUTROPHILS # BLD AUTO: 3.25 X10*3/UL (ref 1.2–7.7)
NEUTROPHILS NFR BLD AUTO: 41.9 %
NRBC BLD-RTO: 0 /100 WBCS (ref 0–0)
OVALOCYTES BLD QL SMEAR: NORMAL
PHOSPHATE SERPL-MCNC: 4.5 MG/DL (ref 2.5–4.5)
PLATELET # BLD AUTO: 162 X10*3/UL (ref 150–450)
POLYCHROMASIA BLD QL SMEAR: NORMAL
POTASSIUM SERPL-SCNC: 3.7 MMOL/L (ref 3.4–5.1)
PROT SERPL-MCNC: 5.7 G/DL (ref 5.9–7.9)
RBC # BLD AUTO: 3.19 X10*6/UL (ref 4.5–5.9)
RBC MORPH BLD: NORMAL
SODIUM SERPL-SCNC: 136 MMOL/L (ref 133–145)
WBC # BLD AUTO: 7.7 X10*3/UL (ref 4.4–11.3)

## 2024-09-02 PROCEDURE — 2500000004 HC RX 250 GENERAL PHARMACY W/ HCPCS (ALT 636 FOR OP/ED): Performed by: NURSE PRACTITIONER

## 2024-09-02 PROCEDURE — 2500000001 HC RX 250 WO HCPCS SELF ADMINISTERED DRUGS (ALT 637 FOR MEDICARE OP): Performed by: NURSE PRACTITIONER

## 2024-09-02 PROCEDURE — 2500000001 HC RX 250 WO HCPCS SELF ADMINISTERED DRUGS (ALT 637 FOR MEDICARE OP): Performed by: INTERNAL MEDICINE

## 2024-09-02 PROCEDURE — 85025 COMPLETE CBC W/AUTO DIFF WBC: CPT | Performed by: NURSE PRACTITIONER

## 2024-09-02 PROCEDURE — 80053 COMPREHEN METABOLIC PANEL: CPT | Performed by: NURSE PRACTITIONER

## 2024-09-02 PROCEDURE — 99239 HOSP IP/OBS DSCHRG MGMT >30: CPT | Performed by: INTERNAL MEDICINE

## 2024-09-02 PROCEDURE — 83735 ASSAY OF MAGNESIUM: CPT | Performed by: NURSE PRACTITIONER

## 2024-09-02 PROCEDURE — 2500000001 HC RX 250 WO HCPCS SELF ADMINISTERED DRUGS (ALT 637 FOR MEDICARE OP)

## 2024-09-02 PROCEDURE — 2500000001 HC RX 250 WO HCPCS SELF ADMINISTERED DRUGS (ALT 637 FOR MEDICARE OP): Performed by: STUDENT IN AN ORGANIZED HEALTH CARE EDUCATION/TRAINING PROGRAM

## 2024-09-02 PROCEDURE — 84100 ASSAY OF PHOSPHORUS: CPT | Performed by: NURSE PRACTITIONER

## 2024-09-02 RX ORDER — ONDANSETRON 4 MG/1
4 TABLET, ORALLY DISINTEGRATING ORAL EVERY 8 HOURS PRN
Start: 2024-09-02

## 2024-09-02 RX ORDER — MULTIVIT-MIN/IRON FUM/FOLIC AC 7.5 MG-4
1 TABLET ORAL DAILY
Start: 2024-09-03

## 2024-09-02 RX ORDER — PANTOPRAZOLE SODIUM 40 MG/1
40 TABLET, DELAYED RELEASE ORAL 2 TIMES DAILY
Start: 2024-09-02

## 2024-09-02 RX ORDER — METOCLOPRAMIDE HYDROCHLORIDE 5 MG/ML
5 INJECTION INTRAMUSCULAR; INTRAVENOUS EVERY 6 HOURS SCHEDULED
Start: 2024-09-02 | End: 2024-09-02 | Stop reason: HOSPADM

## 2024-09-02 RX ORDER — FOLIC ACID 1 MG/1
1 TABLET ORAL DAILY
Start: 2024-09-03

## 2024-09-02 RX ORDER — TRAMADOL HYDROCHLORIDE 50 MG/1
50 TABLET ORAL EVERY 6 HOURS PRN
Status: DISCONTINUED | OUTPATIENT
Start: 2024-09-02 | End: 2024-09-02 | Stop reason: HOSPADM

## 2024-09-02 RX ORDER — GUAIFENESIN 600 MG/1
600 TABLET, EXTENDED RELEASE ORAL 2 TIMES DAILY PRN
Start: 2024-09-02

## 2024-09-02 RX ORDER — GABAPENTIN 100 MG/1
200 CAPSULE ORAL 3 TIMES DAILY
Start: 2024-09-02

## 2024-09-02 RX ORDER — LANOLIN ALCOHOL/MO/W.PET/CERES
100 CREAM (GRAM) TOPICAL DAILY
Start: 2024-09-03

## 2024-09-02 ASSESSMENT — COGNITIVE AND FUNCTIONAL STATUS - GENERAL
HELP NEEDED FOR BATHING: A LITTLE
MOVING FROM LYING ON BACK TO SITTING ON SIDE OF FLAT BED WITH BEDRAILS: A LITTLE
TURNING FROM BACK TO SIDE WHILE IN FLAT BAD: A LITTLE
TOILETING: A LITTLE
MOVING FROM LYING ON BACK TO SITTING ON SIDE OF FLAT BED WITH BEDRAILS: A LITTLE
DRESSING REGULAR UPPER BODY CLOTHING: A LITTLE
EATING MEALS: A LITTLE
EATING MEALS: A LITTLE
MOVING TO AND FROM BED TO CHAIR: A LITTLE
DRESSING REGULAR LOWER BODY CLOTHING: A LITTLE
TURNING FROM BACK TO SIDE WHILE IN FLAT BAD: A LITTLE
WALKING IN HOSPITAL ROOM: A LITTLE
DRESSING REGULAR UPPER BODY CLOTHING: A LITTLE
PERSONAL GROOMING: A LITTLE
DRESSING REGULAR LOWER BODY CLOTHING: A LITTLE
MOBILITY SCORE: 18
MOVING TO AND FROM BED TO CHAIR: A LITTLE
MOBILITY SCORE: 18
HELP NEEDED FOR BATHING: A LITTLE
TOILETING: A LITTLE
PERSONAL GROOMING: A LITTLE
CLIMB 3 TO 5 STEPS WITH RAILING: A LITTLE
STANDING UP FROM CHAIR USING ARMS: A LITTLE
DAILY ACTIVITIY SCORE: 18
WALKING IN HOSPITAL ROOM: A LITTLE
CLIMB 3 TO 5 STEPS WITH RAILING: A LITTLE
STANDING UP FROM CHAIR USING ARMS: A LITTLE
DAILY ACTIVITIY SCORE: 18

## 2024-09-02 ASSESSMENT — PAIN DESCRIPTION - LOCATION: LOCATION: SHOULDER

## 2024-09-02 ASSESSMENT — PAIN DESCRIPTION - ORIENTATION: ORIENTATION: RIGHT

## 2024-09-02 ASSESSMENT — PAIN SCALES - GENERAL
PAINLEVEL_OUTOF10: 0 - NO PAIN
PAINLEVEL_OUTOF10: 0 - NO PAIN
PAINLEVEL_OUTOF10: 6

## 2024-09-02 ASSESSMENT — PAIN SCALES - PAIN ASSESSMENT IN ADVANCED DEMENTIA (PAINAD): TOTALSCORE: MEDICATION (SEE MAR)

## 2024-09-02 ASSESSMENT — PAIN - FUNCTIONAL ASSESSMENT: PAIN_FUNCTIONAL_ASSESSMENT: 0-10

## 2024-09-02 NOTE — PROGRESS NOTES
09/02/24 1322   Discharge Planning   Expected Discharge Disposition SNF  ([facility])   Has discharge transport been arranged? Yes   What day is the transport expected? 09/02/24   What time is the transport expected? 1430     7000 Completed   AVS and goldenrod sent to facility   RN to update pt and family  RN made aware and given report number

## 2024-09-02 NOTE — DISCHARGE SUMMARY
Discharge Diagnosis  Gastrointestinal hemorrhage with hematemesis    Issues Requiring Follow-Up  Alcoholic liver cirrhosis    Discharge Meds     Your medication list        START taking these medications        Instructions Last Dose Given Next Dose Due   folic acid 1 mg tablet  Commonly known as: Folvite  Start taking on: September 3, 2024      Take 1 tablet (1 mg) by mouth once daily.       gabapentin 100 mg capsule  Commonly known as: Neurontin      Take 2 capsules (200 mg) by mouth 3 times a day.       guaiFENesin 600 mg 12 hr tablet  Commonly known as: Mucinex      Take 1 tablet (600 mg) by mouth 2 times a day as needed for cough. Do not crush, chew, or split.       multivitamin with minerals tablet  Start taking on: September 3, 2024      Take 1 tablet by mouth once daily.       ondansetron ODT 4 mg disintegrating tablet  Commonly known as: Zofran-ODT      Take 1 tablet (4 mg) by mouth every 8 hours if needed for nausea or vomiting.       pantoprazole 40 mg EC tablet  Commonly known as: ProtoNix      Take 1 tablet (40 mg) by mouth 2 times a day. Do not crush, chew, or split.       thiamine 100 mg tablet  Commonly known as: Vitamin B-1  Start taking on: September 3, 2024      Take 1 tablet (100 mg) by mouth once daily.                 Where to Get Your Medications        Information about where to get these medications is not yet available    Ask your nurse or doctor about these medications  folic acid 1 mg tablet  gabapentin 100 mg capsule  guaiFENesin 600 mg 12 hr tablet  multivitamin with minerals tablet  ondansetron ODT 4 mg disintegrating tablet  pantoprazole 40 mg EC tablet  thiamine 100 mg tablet         Test Results Pending At Discharge  Pending Labs       Order Current Status    Sterile Fluid Culture/Smear Preliminary result            Hospital Course   History:Israel Parikh is a 68 y.o. year old male patient with past medical history that he is not aware of. He states that he has not taking any  medications in the past 6 months. He presented to the ER for GI bleed. Patient states he has had nausea and vomiting for the past several days. He notes blood within the vomit, described as dark coffee grounds. Patient also notes multiple episodes of diarrhea, noted to have stool in his pants. [facility] states [facility] and upon returning home has been too weak to purchase groceries and therefore has not eaten anything in the past several days. Denies fevers. No abdominal pain. No prior history of gastric ulcers. States that he drinks about 1-2 L of whiskey per day, but when he does drink he drinks at least 1 pint. Last drink was 2 days ago      Patient originally admitted to step down but had multiple large bloody BM in the ER. Hgb was 10 when baseline is 14-15. Patient is hemodynamically stable but Hgb dropped from 10 -> 5 after blood BM. Will admit to ICU.   Patient was admitted to ICU.  He had a transfusion of 2 units of red blood cells, 1 unit of FFP and 1 unit of platelets.  Patient was seen by EGD, was on IV PPI.  EGD demonstrated Shi-Sands tear.  Patient had paracentesis yesterday with 2.8 L of fluid drained.  Hemoglobin is stable.  No evidence of ongoing bleeding.  Patient went through phenobarbital taper for alcohol withdrawal.  Patient was accepted to rehab and he will be discharged today.  Total time spent with patient today coordinating discharge including exam, discussion, paperwork 33 minutes.    Pertinent Physical Exam At Time of Discharge  Physical Exam  Pt is NAD.  Skin is pale  Cooperative with exam.  In no distress at rest.  A, Ox3.  Face is symmetrical.  Skin - no lesions.  Lungs: clear to auscultations B/L. No wheezes, rales, rhonchi.  Heart: regular S1S2.  Abdomen: Rounded, NT, ND. BS positive.  Extr.: no edema, cords, cyanosis.  Moves all extr.   Outpatient Follow-Up  No future appointments.  PCP  Gastroenterologist    Charu Joseph MD

## 2024-09-02 NOTE — CARE PLAN
Problem: Fall/Injury  Goal: Not fall by end of shift  Outcome: Progressing  Goal: Be free from injury by end of the shift  Outcome: Progressing  Goal: Verbalize understanding of personal risk factors for fall in the hospital  Outcome: Progressing  Goal: Verbalize understanding of risk factor reduction measures to prevent injury from fall in the home  Outcome: Progressing  Goal: Use assistive devices by end of the shift  Outcome: Progressing  Goal: Pace activities to prevent fatigue by end of the shift  Outcome: Progressing     Problem: Pain  Goal: Takes deep breaths with improved pain control throughout the shift  Outcome: Progressing  Goal: Turns in bed with improved pain control throughout the shift  Outcome: Progressing  Goal: Walks with improved pain control throughout the shift  Outcome: Progressing  Goal: Performs ADL's with improved pain control throughout shift  Outcome: Progressing  Goal: Participates in PT with improved pain control throughout the shift  Outcome: Progressing  Goal: Free from acute confusion related to pain meds throughout the shift  Outcome: Progressing     Problem: Skin  Goal: Participates in plan/prevention/treatment measures  Outcome: Progressing  Goal: Prevent/manage excess moisture  Outcome: Progressing  Goal: Prevent/minimize sheer/friction injuries  Outcome: Progressing  Goal: Promote/optimize nutrition  Outcome: Progressing  Goal: Promote skin healing  Outcome: Progressing     Problem: Safety - Adult  Goal: Free from fall injury  Outcome: Progressing     Problem: Discharge Planning  Goal: Discharge to home or other facility with appropriate resources  Outcome: Progressing     Problem: Chronic Conditions and Co-morbidities  Goal: Patient's chronic conditions and co-morbidity symptoms are monitored and maintained or improved  Outcome: Progressing     Problem: Knowledge Deficit  Goal: Patient/family/caregiver demonstrates understanding of disease process, treatment plan,  medications, and discharge instructions  Outcome: Progressing     Problem: Mechanical Ventilation  Goal: Oral health is maintained or improved  Outcome: Progressing  Goal: Ability to express needs and understand communication  Outcome: Progressing  Goal: Mobility/activity is maintained at optimum level for patient  Outcome: Progressing

## 2024-09-02 NOTE — CARE PLAN
The patient's goals for the shift include feel better    The clinical goals for the shift include maintain safety

## 2024-09-05 LAB
BACTERIA FLD CULT: NORMAL
GRAM STN SPEC: NORMAL
GRAM STN SPEC: NORMAL

## 2025-07-09 NOTE — PROGRESS NOTES
Israel Parikh is a 68 y.o. male on day 4 of admission presenting with Gastrointestinal hemorrhage with hematemesis.      Subjective   Seen after transferring out of the ICU. States he feels okay at the moment. 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 Does not feel like he has any symptoms of alcohol withdrawal currently. Denies abd pain and does not feel like he needs a paracentesis.        Objective     Last Recorded Vitals  /60 (BP Location: Left arm, Patient Position: Lying)   Pulse 102   Temp 37 °C (98.6 °F) (Oral)   Resp 22   Wt 93.7 kg (206 lb 9.1 oz)   SpO2 95%   Intake/Output last 3 Shifts:    Intake/Output Summary (Last 24 hours) at 8/30/2024 1616  Last data filed at 8/30/2024 0900  Gross per 24 hour   Intake 480 ml   Output 600 ml   Net -120 ml       Admission Weight  Weight: 83.9 kg (185 lb) (08/26/24 0405)    Daily Weight  08/30/24 : 93.7 kg (206 lb 9.1 oz)    Image Results  US right upper quadrant  Narrative: Interpreted By:  Nuris Montes,   STUDY:  US RIGHT UPPER QUADRANT; 1:12 pm      INDICATION:  Signs/Symptoms:elevated liver function tests      COMPARISON:  04/09/2023      ACCESSION NUMBER(S):  [id number]      ORDERING CLINICIAN:  MARLON MORENO      TECHNIQUE:  Limited abdominal ultrasound of the right upper quadrant was  performed utilizing gray scale imaging.      FINDINGS:  Liver: The hepatic contour is nodular. There is increased  echogenicity of the hepatic parenchyma. The right hepatic lobe is  relatively small in size. The appearance is consistent with  cirrhosis. There is a large recanalized umbilical vein noted. The  main portal vein measures 1.7 cm in diameter. Gallbladder: No  gallbladder wall thickening is seen. There is an echogenic stone seen  within the gallbladder with sludge also noted in the gallbladder  lumen. Sonographic Green's sign: Negative  Pancreas: The pancreatic head and body appear unremarkable.   Wound care performed to all wounds noted on the LDA per wound care orders. Pt with no grimace noted while performing wound care.   The  distal pancreatic body and tail are obscured by overlying bowel gas.  CBD: 4 mm      Cursory evaluation of right kidney shows no abnormality.      There is a small amount of ascites within right upper quadrant.      Impression: Cirrhosis with evidence of portal venous hypertension resulting in  recanalized umbilical vein which is large in size.      Gallbladder sludge with cholelithiasis noted as well. No evidence of  cholecystitis or biliary ductal dilatation.      Small amount of ascites within right upper quadrant.      MACRO:  None.      Signed by: Nuris Montes 8/28/2024 1:16 PM  Dictation workstation:   [id number]      Physical Exam  Constitutional:       General: He is not in acute distress.     Appearance: He is not toxic-appearing.   HENT:      Head: Normocephalic.      Mouth/Throat:      Pharynx: Oropharynx is clear.   Eyes:      General: Scleral icterus (mild) present.   Cardiovascular:      Rate and Rhythm: Normal rate.   Pulmonary:      Effort: No respiratory distress.      Breath sounds: No wheezing.   Abdominal:      General: There is no distension.      Palpations: Abdomen is soft.      Tenderness: There is no abdominal tenderness.   Musculoskeletal:      Right lower leg: No edema.      Left lower leg: No edema.   Neurological:      Mental Status: He is alert and oriented to person, place, and time.         Relevant Results               Assessment/Plan        Assessment & Plan  Gastrointestinal hemorrhage with hematemesis  GI following  S/p EGD 8/26/24 which showed hiatal hernia, small gastric varices, moderate esophagitis, xander-cortez tear of the esophagus s/p clipping x2, edematous mucosa in the 2nd part of the duodenum  Continue PPI BID, reglan, and bentyl  Diet per GI  Acute blood loss anemia  Baseline Hgb ~15  S/p 2 units RBCs, 1 unit FFP, and 1 unit platelets  Hgb stable  Transfuse for Hgb <7  Diarrhea of presumed infectious origin  Unclear etiology, possibly infectious in origin given  fever on 8/26; however no stool studies were completed  CT A/P showing evidence of colitis  S/p FMS  Resolved   Alcohol use with withdrawal (Multi)  Continue phenobarb taper  Thiamine, folate, multivitamin  Seizure, fall, and aspiration precautions  Recommend cessation  Alcoholic cirrhosis of liver with ascites (Multi)  MELD score of 24 on admission  RUQ US showing cirrhosis with evidence of portal venous hypertension   Acute hepatitis panel negative  [facility]  Thrombocytopenia (CMS-HCC)  Likely due to cirrhosis    Plan:  Continue phenobarb taper  PT/OT - moderate intensity rehab, patient agreeable to SNF placement  [facility]              Arleth Lisa MD